# Patient Record
Sex: FEMALE | Race: WHITE | Employment: OTHER | URBAN - METROPOLITAN AREA
[De-identification: names, ages, dates, MRNs, and addresses within clinical notes are randomized per-mention and may not be internally consistent; named-entity substitution may affect disease eponyms.]

---

## 2017-02-10 ENCOUNTER — TELEPHONE (OUTPATIENT)
Dept: FAMILY MEDICINE CLINIC | Age: 77
End: 2017-02-10

## 2017-02-10 RX ORDER — ACETAMINOPHEN 500 MG
500 TABLET ORAL
Qty: 90 TAB | Refills: 0 | Status: SHIPPED | OUTPATIENT
Start: 2017-02-10

## 2017-02-10 NOTE — TELEPHONE ENCOUNTER
Writer attempted to reach patient's daughter Domingo Owens on HIPAA form in regard to correspondences received from patient's assisted living home [facility] [place]. Correspondences are in regard to diet requirements for patient and requesting medications for cold symptoms. Writer requested patient's daughter to call the office as Masoodsameer Flores is no longer with the office and was under the impression patient's care was now under the PCP with [place].

## 2017-02-10 NOTE — TELEPHONE ENCOUNTER
Kortney Hodgson returned writer's call, patient's daughter advised she will be continuing care with our office under  when she is due for a follow up in June(6 moths from last visit). Kortney Hodgson also advised patient has chronic allergies and cold symptoms, and does not require any medication as requested per fax received from [facility] today. Also advised she was in the facility yesterday and did not note her mother to be sick. In regard to patient's diet request for received Nimisha advised patient does not require pureed,mechanical soft or thickened liquids. Advised patient eats a normal diet and may require an aid to assist with feedings. Writer contacted patient assisted living facility to advise of above.

## 2017-02-10 NOTE — TELEPHONE ENCOUNTER
Patient must have orders for all medications including PRN's , patient assisted living facility is requesting an order for Tylenol 500 mg PRN. Patient plans to follow up with  is June when follow up is due.

## 2017-02-10 NOTE — TELEPHONE ENCOUNTER
Cristian Nevarez    -     [phone]    -  Did an order for Tylenol 500MG come over from [facility]-  Please advise

## 2017-04-01 ENCOUNTER — TELEPHONE (OUTPATIENT)
Dept: FAMILY MEDICINE CLINIC | Age: 77
End: 2017-04-01

## 2017-04-01 RX ORDER — NYSTATIN 100000 U/G
CREAM TOPICAL 2 TIMES DAILY
Qty: 30 G | Refills: 2 | Status: ON HOLD | OUTPATIENT
Start: 2017-04-01 | End: 2020-11-20

## 2017-04-01 NOTE — TELEPHONE ENCOUNTER
Former [facility] patient  Got a fax that she has intertrigo (redness groin and under belly).  Will send in cream and fax form back to home ([facility] assisted living [phone])

## 2017-06-05 ENCOUNTER — OFFICE VISIT (OUTPATIENT)
Dept: FAMILY MEDICINE CLINIC | Age: 77
End: 2017-06-05

## 2017-06-05 VITALS
HEART RATE: 55 BPM | BODY MASS INDEX: 36.64 KG/M2 | RESPIRATION RATE: 16 BRPM | SYSTOLIC BLOOD PRESSURE: 132 MMHG | HEIGHT: 66 IN | DIASTOLIC BLOOD PRESSURE: 62 MMHG | TEMPERATURE: 98.2 F | WEIGHT: 228 LBS | OXYGEN SATURATION: 95 %

## 2017-06-05 DIAGNOSIS — M35.3 POLYMYALGIA RHEUMATICA (HCC): Primary | ICD-10-CM

## 2017-06-05 DIAGNOSIS — Z79.899 ENCOUNTER FOR MEDICATION REVIEW: ICD-10-CM

## 2017-06-05 DIAGNOSIS — E11.8 TYPE 2 DIABETES MELLITUS WITH COMPLICATION, WITHOUT LONG-TERM CURRENT USE OF INSULIN (HCC): ICD-10-CM

## 2017-06-05 LAB — HBA1C MFR BLD HPLC: 7.6 %
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
## 2017-06-05 NOTE — PROGRESS NOTES
Patient presents in office today with daughter to establish care with provider  Would like order written for otc cough medicine to be given prn at [facility]    Chief Complaint   Patient presents with   [address] transfer     1. Have you been to the ER, urgent care clinic since your last visit? Hospitalized since your last visit? No    2. Have you seen or consulted any other health care providers outside of the [address] [place] [address] since your last visit? Include any pap smears or colon screening. No     PHQ over the last two weeks 6/5/2017   Little interest or pleasure in doing things Not at all   Feeling down, depressed or hopeless Not at all   Total Score PHQ 2 0     Fall Risk Assessment, last 12 mths 6/5/2017   Able to walk? Yes   Fall in past 12 months? Yes   Fall with injury?  No   Number of falls in past 12 months 3   Fall Risk Score 3     Vitals:    06/05/17 1533   BP: 132/62   BP 1 Location: Left arm   BP Patient Position: Sitting   Pulse: (!) 55   Resp: 16   Temp: 98.2 °F (36.8 °C)   TempSrc: Oral   SpO2: 95%   Weight: 228 lb (103.4 kg)   Height: 5' 6\" (1.676 m)

## 2017-06-05 NOTE — PATIENT INSTRUCTIONS

## 2017-06-08 ENCOUNTER — DOCUMENTATION ONLY (OUTPATIENT)
Dept: FAMILY MEDICINE CLINIC | Age: 77
End: 2017-06-08

## 2017-06-08 DIAGNOSIS — R52 GENERALIZED PAIN: ICD-10-CM

## 2017-06-08 DIAGNOSIS — Z88.9 H/O SEASONAL ALLERGIES: Primary | ICD-10-CM

## 2017-06-08 RX ORDER — FLUOXETINE 20 MG/1
TABLET ORAL
COMMUNITY
Start: 2017-05-24 | End: 2017-06-13

## 2017-06-08 RX ORDER — OSELTAMIVIR PHOSPHATE 75 MG/1
CAPSULE ORAL
COMMUNITY
Start: 2017-04-07 | End: 2017-06-13

## 2017-06-08 RX ORDER — LORATADINE 10 MG/1
10 TABLET ORAL
Qty: 30 TAB | Refills: 5
Start: 2017-06-08 | End: 2017-10-02 | Stop reason: SDUPTHER

## 2017-06-09 ENCOUNTER — TELEPHONE (OUTPATIENT)
Dept: FAMILY MEDICINE CLINIC | Age: 77
End: 2017-06-09

## 2017-06-09 NOTE — TELEPHONE ENCOUNTER
[id number] [place] [address] Dr -   [facility] [place] need an Order for Urine sample to be tested for UTI-

## 2017-06-09 NOTE — TELEPHONE ENCOUNTER
Called daughter, because of Dr. Catalino Lopez recommendations, she would like to have her evaluated instead of collected and sent out to lab [facility].     Adv daughter of this, she will speak with her sister who is near pts residential and discuss. Reviewed all emergency Signs/Sx with the patient, and when appropriate to arrange for urgent care. Advised patient of after hours/on-call office phone numbers and locations for after hours care. Patient/ Caller given an opportunity to ask questions, repeated information, and verbalized understanding.

## 2017-06-12 ENCOUNTER — TELEPHONE (OUTPATIENT)
Dept: FAMILY MEDICINE CLINIC | Age: 77
End: 2017-06-12

## 2017-06-12 NOTE — TELEPHONE ENCOUNTER
Okay to work her in tomorrow; may take evening slot if needed. If her clinical status worsens in the interim, she should go seek urgent care.

## 2017-06-12 NOTE — TELEPHONE ENCOUNTER
Contact # is [facility], patient's daughter, states Dr RAMOS was concerned about patient having a UTI on Friday & she is wondering if Dr RAMOS can put in the order for a urine sample so she can bring patient in today to do it.

## 2017-06-12 NOTE — TELEPHONE ENCOUNTER
Routing to Dr. Samara Hanna,     Called daughter on Friday and advised her that patient would need to be examined. There are no same day appointments left for today. Would you like for me to work her in tomorrow, or do you want to do a urine sample for the lab?

## 2017-06-13 ENCOUNTER — OFFICE VISIT (OUTPATIENT)
Dept: FAMILY MEDICINE CLINIC | Age: 77
End: 2017-06-13

## 2017-06-13 VITALS
BODY MASS INDEX: 36.82 KG/M2 | DIASTOLIC BLOOD PRESSURE: 64 MMHG | HEART RATE: 59 BPM | RESPIRATION RATE: 14 BRPM | SYSTOLIC BLOOD PRESSURE: 138 MMHG | HEIGHT: 66 IN | TEMPERATURE: 98.4 F | OXYGEN SATURATION: 96 % | WEIGHT: 229.13 LBS

## 2017-06-13 DIAGNOSIS — R46.89 EPISODE OF ABNORMAL BEHAVIOR: Primary | ICD-10-CM

## 2017-06-13 NOTE — PROGRESS NOTES
Unusual behavior started x 4 days from assisted living facility   Possible concerns with uti    Chief Complaint   Patient presents with    UTI     r/o uti x 4 days       1. Have you been to the ER, urgent care clinic since your last visit? Hospitalized since your last visit? No    2. Have you seen or consulted any other health care providers outside of the [address] [place] [address] since your last visit? Include any pap smears or colon screening. No    PHQ over the last two weeks 6/13/2017   Little interest or pleasure in doing things Not at all   Feeling down, depressed or hopeless Not at all   Total Score PHQ 2 0     Fall Risk Assessment, last 12 mths 6/13/2017   Able to walk? Yes   Fall in past 12 months?  No   Fall with injury? -   Number of falls in past 12 months -   Fall Risk Score -

## 2017-06-13 NOTE — PATIENT INSTRUCTIONS
Dementia: Care Instructions  Your Care Instructions  Dementia is a loss of mental skills that affects your daily life. It is different than the occasional trouble with memory that is part of aging. You may find it hard to remember things that you feel you should be able to remember. Or you may feel that your mind is just not working as well as usual.  Finding out that you have dementia is a shock. You may be afraid and worried about how the condition will change your life. Although there is no cure at this time, medicine may slow memory loss and improve thinking for a while. Other medicines may be able to help you sleep or cope with depression and behavior changes. Dementia often gets worse slowly. But it can get worse quickly. As dementia gets worse, it may become harder to do common things that take planning, like making a list and going shopping. Over time, the disease may make it hard for you to take care of yourself. Some people with dementia need others to help care for them. Dementia is different for everyone. You may be able to function well for a long time. In the early stage of the condition, you can do things at home to make life easier and safer. You also can keep doing your hobbies and other activities. Many people find comfort in planning now for their future needs. Follow-up care is a key part of your treatment and safety. Be sure to make and go to all appointments, and call your doctor if you are having problems. Its also a good idea to know your test results and keep a list of the medicines you take. How can you care for yourself at home? · Take your medicines exactly as prescribed. Call your doctor if you think you are having a problem with your medicine. · Eat healthy foods. Eat lots of whole grains, fruits, and vegetables every day.  If you are not hungry, try snacks or nutritional drinks such as Boost, Ensure, or Sustacal.  · If you have problems sleeping:  ¨ Try not to nap too close to your bedtime. ¨ Exercise regularly. Walking is a good choice. ¨ Try a glass of warm milk or caffeine-free herbal tea before bed. · Do tasks and activities during the time of day when you feel your best. It may help to develop a daily routine. · Post labels, lists, and sticky notes to help you remember things. Write your activities on a calendar you can easily find. Put your clock where you can easily see it. · Stay active. Take walks in familiar places, or with friends or loved ones. Try to stay active mentally too. Read and work crossword puzzles if you enjoy these activities. · Do not drive unless you can pass an on-road driving test. If you are not sure if you are safe to drive, your state s license bureau can test you. · Keep a cordless phone and a flashlight with new batteries by your bed. If possible, put a phone in each of the main rooms of your house, or carry a cell phone in case you fall and cannot reach a phone. Or, you can wear a device around your neck or wrist. You push a button that sends a signal for help. Acknowledge your emotions and plan for the future  · Talk openly and honestly with your doctor. · Let yourself grieve. It is common to feel angry, scared, frustrated, anxious, or depressed. · Get emotional support from family, friends, a support group, or a counselor experienced in working with people who have dementia. · Ask for help if you need it. · Plan for the future. ¨ Talk to your family and doctor about preparing a living will and other important papers while you can make decisions. These papers tell your doctors how to care for you at the end of your life. ¨ Consider naming a person to make decisions about your care if you are not able to. When should you call for help? Call 911 anytime you think you may need emergency care. For example, call if:  · You are lost and do not know whom to call. · You are injured and do not know whom to call.   Call your doctor now or seek immediate medical care if:  · You are more confused or upset than usual.  · You feel like you could hurt yourself because your mind is not working well. Watch closely for changes in your health, and be sure to contact your doctor if you have any problems. Where can you learn more? Go to http://jean claude-cris.info/. Enter E883 in the search box to learn more about \"Dementia: Care Instructions. \"  Current as of: July 26, 2016  Content Version: 11.2  © 8112-3072 [employer]. Care instructions adapted under license by [employer] (which disclaims liability or warranty for this information). If you have questions about a medical condition or this instruction, always ask your healthcare professional. [address] any warranty or liability for your use of this information.

## 2017-06-13 NOTE — MR AVS SNAPSHOT
Visit Information Date & Time Provider Department Dept. Phone Encounter #  
 6/13/2017  3:15 PM Tam[facility] Diana  [facility] [address] [phone] [id number] Follow-up Instructions Return if symptoms worsen or fail to improve. Your Appointments 6/13/2017  3:15 PM  
SAME DAY with Tamera Diana MD  
[facility]) Appt Note: UTI Symptoms [address] [facility] [address]  
[phone]  
  
   
 [address] [facility] [address]  
  
    
 12/5/2017  2:45 PM  
ROUTINE CARE with Tamera Diana MD  
[facility] Appt Note: 6mo follow up  
 [address] [address]  
[phone]  
  
   
 [address] [address] Upcoming Health Maintenance Date Due  
 EYE EXAM RETINAL OR DILATED Q1 8/20/2015 GLAUCOMA SCREENING Q2Y 8/20/2016 LIPID PANEL Q1 11/10/2016 FOOT EXAM Q1 5/12/2017 MICROALBUMIN Q1 5/12/2017 INFLUENZA AGE 9 TO ADULT 8/1/2017 Pneumococcal 65+ Low/Medium Risk (2 of 2 - PPSV23) 9/9/2017 HEMOGLOBIN A1C Q6M 12/5/2017 MEDICARE YEARLY EXAM 12/7/2017 DTaP/Tdap/Td series (2 - Td) 9/18/2026 Allergies as of 6/13/2017  Review Complete On: 6/13/2017 By: Michael Bowers LPN No Known Allergies Current Immunizations  Reviewed on 6/5/2017 Name Date Influenza High Dose Vaccine PF 9/21/2016 Influenza Vaccine 11/7/2013 Pneumococcal Vaccine (Unspecified Type) 9/9/2012 TB Skin Test (PPD) Intradermal 5/15/2015 Td 9/2/2012 Tdap 9/18/2016  5:39 PM  
  
 Not reviewed this visit Vitals BP Pulse Temp Resp Height(growth percentile) Weight(growth percentile)  
 138/64 (BP 1 Location: Right arm, BP Patient Position: Sitting) (!) 59 98.4 °F (36.9 °C) (Oral) 14 5' 6\" (1.676 m) 229 lb 2 oz (103.9 kg) SpO2 BMI OB Status Smoking Status 96% 36.98 kg/m2 Hysterectomy Former Smoker Vitals History BMI and BSA Data Body Mass Index Body Surface Area  
 36.98 kg/m 2 2.2 m 2 Preferred Pharmacy Pharmacy Name Phone [facility], [address] [phone] Your Updated Medication List  
  
   
This list is accurate as of: 6/13/17  1:58 PM.  Always use your most recent med list.  
  
  
  
  
 acetaminophen 500 mg tablet Commonly known as:  TYLENOL Take 1 Tab by mouth every six (6) hours as needed for Pain. aspirin delayed-release 81 mg tablet Take  by mouth daily. calcium-cholecalciferol (D3) tablet Commonly known as:  CALTRATE 600+D Take 1 Tab by mouth daily. cholecalciferol 400 unit Tab tablet Commonly known as:  VITAMIN D3 Take 400 Units by mouth two (2) times a day. donepezil 10 mg tablet Commonly known as:  ARICEPT Take 10 mg by mouth nightly. FLUoxetine 20 mg capsule Commonly known as:  PROzac TAKE 3 CAPSULES BY MOUTH DAILY. hydroxychloroquine 200 mg tablet Commonly known as:  PLAQUENIL Take 1 Tab by mouth two (2) times a day. loratadine 10 mg tablet Commonly known as:  Rory Otter Take 1 Tab by mouth daily as needed for Allergies. * nystatin powder Commonly known as:  MYCOSTATIN Apply  to affected area four (4) times daily as needed. * nystatin topical cream  
Commonly known as:  MYCOSTATIN Apply  to affected area two (2) times a day. For intertrigo  
  
 predniSONE 1 mg tablet Commonly known as:  DELTASONE  
TAKE 3 TABLETS BY MOUTH EVERY DAY  
  
 VITAMIN B-12 PO Take 500 mcg by mouth daily. * Notice: This list has 2 medication(s) that are the same as other medications prescribed for you. Read the directions carefully, and ask your doctor or other care provider to review them with you. Follow-up Instructions Return if symptoms worsen or fail to improve. Patient Instructions Dementia: Care Instructions Your Care Instructions Dementia is a loss of mental skills that affects your daily life. It is different than the occasional trouble with memory that is part of aging. You may find it hard to remember things that you feel you should be able to remember. Or you may feel that your mind is just not working as well as usual. 
Finding out that you have dementia is a shock. You may be afraid and worried about how the condition will change your life. Although there is no cure at this time, medicine may slow memory loss and improve thinking for a while. Other medicines may be able to help you sleep or cope with depression and behavior changes. Dementia often gets worse slowly. But it can get worse quickly. As dementia gets worse, it may become harder to do common things that take planning, like making a list and going shopping. Over time, the disease may make it hard for you to take care of yourself. Some people with dementia need others to help care for them. Dementia is different for everyone. You may be able to function well for a long time. In the early stage of the condition, you can do things at home to make life easier and safer. You also can keep doing your hobbies and other activities. Many people find comfort in planning now for their future needs. Follow-up care is a key part of your treatment and safety. Be sure to make and go to all appointments, and call your doctor if you are having problems. Its also a good idea to know your test results and keep a list of the medicines you take. How can you care for yourself at home? · Take your medicines exactly as prescribed. Call your doctor if you think you are having a problem with your medicine. · Eat healthy foods. Eat lots of whole grains, fruits, and vegetables every day. If you are not hungry, try snacks or nutritional drinks such as Boost, Ensure, or Sustacal. 
· If you have problems sleeping: ¨ Try not to nap too close to your bedtime. ¨ Exercise regularly. Walking is a good choice. ¨ Try a glass of warm milk or caffeine-free herbal tea before bed. · Do tasks and activities during the time of day when you feel your best. It may help to develop a daily routine. · Post labels, lists, and sticky notes to help you remember things. Write your activities on a calendar you can easily find. Put your clock where you can easily see it. · Stay active. Take walks in familiar places, or with friends or loved ones. Try to stay active mentally too. Read and work crossword puzzles if you enjoy these activities. · Do not drive unless you can pass an on-road driving test. If you are not sure if you are safe to drive, your state s license bureau can test you. · Keep a cordless phone and a flashlight with new batteries by your bed. If possible, put a phone in each of the main rooms of your house, or carry a cell phone in case you fall and cannot reach a phone. Or, you can wear a device around your neck or wrist. You push a button that sends a signal for help. Acknowledge your emotions and plan for the future · Talk openly and honestly with your doctor. · Let yourself grieve. It is common to feel angry, scared, frustrated, anxious, or depressed. · Get emotional support from family, friends, a support group, or a counselor experienced in working with people who have dementia. · Ask for help if you need it. · Plan for the future. ¨ Talk to your family and doctor about preparing a living will and other important papers while you can make decisions. These papers tell your doctors how to care for you at the end of your life. ¨ Consider naming a person to make decisions about your care if you are not able to. When should you call for help? Call 911 anytime you think you may need emergency care. For example, call if: 
· You are lost and do not know whom to call. · You are injured and do not know whom to call. Call your doctor now or seek immediate medical care if: 
· You are more confused or upset than usual. 
· You feel like you could hurt yourself because your mind is not working well. Watch closely for changes in your health, and be sure to contact your doctor if you have any problems. Where can you learn more? Go to http://jean claude-cris.info/. Enter W076 in the search box to learn more about \"Dementia: Care Instructions. \" Current as of: July 26, 2016 Content Version: 11.2 © 7912-7641 [employer]. Care instructions adapted under license by [employer] (which disclaims liability or warranty for this information). If you have questions about a medical condition or this instruction, always ask your healthcare professional. [address] any warranty or liability for your use of this information. Introducing [facility]! Dear Vitaly Adames: Thank you for requesting a [employer] account. Our records indicate that you already have an active Webee account. You can access your account anytime at [email or web address] [employer] Did you know that you can access your hospital and ER discharge instructions at any time in [employer]? You can also review all of your test results from your hospital stay or ER visit. Additional Information If you have questions, please visit the Frequently Asked Questions section of the [employer] website at [email or web address] Remember, Webee is NOT to be used for urgent needs. For medical emergencies, dial 911. Now available from your iPhone and Android! Please provide this summary of care documentation to your next provider. Your primary care clinician is listed as Ramin Casas. If you have any questions after today's visit, please call [phone].

## 2017-06-13 NOTE — PROGRESS NOTES
Patient Name: Steve Gill   MRN: [id number]    Ab Melgoza is a 76 y.o. female who presents with the following: here with daughter    Daughter reports that a new nurse at her group home stated that pt was acting funny last week earlier in the day. Does have a hx of sundowning but was wandering around the facility, trying to get into rooms. Daughter reported that the nurse wanted pt to be evaluated for a UTI. Daughter reports the nurse may have been new and not familiar with patient; another nurse who knows the patient better denied any behavioral change from baseline. Pt does have hx of UTIs, likely due to urinary incontinence. No hx of fever, abdominal pain, dysuria, diarrhea, poor appetite. No recent med changes. Review of Systems   Unable to perform ROS: Dementia       The patient's medications, allergies, past medical history, surgical history, family history and social history were reviewed and updated where appropriate. Prior to Admission medications    Medication Sig Start Date End Date Taking? Authorizing Provider   FLUoxetine (PROZAC) 20 mg tablet  5/24/17  Yes Historical Provider   oseltamivir (TAMIFLU) 75 mg capsule  4/7/17  Yes Historical Provider   loratadine (CLARITIN) 10 mg tablet Take 1 Tab by mouth daily as needed for Allergies. 6/8/17  Yes Sai Harper MD   nystatin (MYCOSTATIN) topical cream Apply  to affected area two (2) times a day. For intertrigo 4/1/17  Yes Stacy Shaver MD   acetaminophen (TYLENOL) 500 mg tablet Take 1 Tab by mouth every six (6) hours as needed for Pain. 2/10/17  Yes Sai Harper MD   aspirin delayed-release 81 mg tablet Take  by mouth daily. Yes Historical Provider   nystatin (MYCOSTATIN) powder Apply  to affected area four (4) times daily as needed. 7/6/16  Yes Georgia Enamorado DO   donepezil (ARICEPT) 10 mg tablet Take 10 mg by mouth nightly.  12/2/15  Yes Historical Provider   FLUoxetine (PROZAC) 20 mg capsule TAKE 3 CAPSULES BY MOUTH DAILY. 6/2/15  Yes Taylor Bosworth, DO   predniSONE (DELTASONE) 1 mg tablet TAKE 3 TABLETS BY MOUTH EVERY DAY 1/24/15  Yes Paola Crain MD   hydroxychloroquine (PLAQUENIL) 200 mg tablet Take 1 Tab by mouth two (2) times a day. 7/8/14  Yes Caroline Leblanc PA-C   CYANOCOBALAMIN, VITAMIN B-12, (VITAMIN B-12 PO) Take 500 mcg by mouth daily. Yes Historical Provider   calcium-cholecalciferol, D3, (CALTRATE 600+D) tablet Take 1 Tab by mouth daily. Yes Historical Provider   cholecalciferol (VITAMIN D3) 400 unit Tab tablet Take 400 Units by mouth two (2) times a day. Yes Historical Provider       No Known Allergies        OBJECTIVE    Visit Vitals    /64 (BP 1 Location: Right arm, BP Patient Position: Sitting)    Pulse (!) 59    Temp 98.4 °F (36.9 °C) (Oral)    Resp 14    Ht 5' 6\" (1.676 m)    Wt 229 lb 2 oz (103.9 kg)    SpO2 96%    BMI 36.98 kg/m2       Physical Exam   Constitutional: She is well-developed, well-nourished, and in no distress. No distress. HENT:   Head: Normocephalic and atraumatic. Right Ear: External ear normal.   Left Ear: External ear normal.   Eyes: Conjunctivae and EOM are normal. Pupils are equal, round, and reactive to light. Cardiovascular: Normal rate, regular rhythm and normal heart sounds. Exam reveals no gallop and no friction rub. No murmur heard. Pulmonary/Chest: Effort normal and breath sounds normal. No respiratory distress. She has no wheezes. Abdominal: Soft. Bowel sounds are normal. She exhibits no distension and no mass. There is no tenderness. There is no rebound and no guarding. Skin: She is not diaphoretic. Psychiatric: Mood, memory, affect and judgment normal.   Nursing note and vitals reviewed. 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 Episode of abnormal behavior  Unclear if patient is acting different from baseline.  No recent med changes and currently asymptomatic. Will rule out UTI with order sent to [facility]; will await results. Medications Discontinued During This Encounter   Medication Reason    oseltamivir (TAMIFLU) 75 mg capsule Not A Current Medication    FLUoxetine (PROZAC) 20 mg tablet Not A Current Medication       Follow-up Disposition:  Return if symptoms worsen or fail to improve. Medication risks/benefits/costs/interactions/alternatives discussed with patient. Advised patient to call back or return to office if symptoms worsen/change/persist. If patient cannot reach us or should anything more severe/urgent arise he/she should proceed directly to the nearest emergency department. Discussed expected course/resolution/complications of diagnosis in detail with patient. Patient given a written after visit summary which includes his/her diagnoses, current medications and vitals. Patient expressed understanding with the diagnosis and plan.      Luis Hernández M.D.

## 2017-06-22 LAB
BILIRUB UR QL: NEGATIVE
BLOOD CULTURE: NORMAL
CLARITY: NORMAL
COLOR, 120428: NORMAL
EPITHELIAL CELLS: NORMAL
Lab: 5.5
Lab: NEGATIVE
Lab: NEGATIVE
Lab: NORMAL
NITRITE UR QL STRIP: NEGATIVE
PROT UR-MCNC: NORMAL G/DL
SP GR UR: 1.02
UROBILINOGEN UR STRIP-MCNC: <2 MG/DL
WBC URNS QL MICRO: >50

## 2017-06-23 ENCOUNTER — TELEPHONE (OUTPATIENT)
Dept: FAMILY MEDICINE CLINIC | Age: 77
End: 2017-06-23

## 2017-06-23 DIAGNOSIS — N39.0 URINARY TRACT INFECTION WITHOUT HEMATURIA, SITE UNSPECIFIED: Primary | ICD-10-CM

## 2017-06-23 RX ORDER — SULFAMETHOXAZOLE AND TRIMETHOPRIM 800; 160 MG/1; MG/1
1 TABLET ORAL 2 TIMES DAILY
Qty: 6 TAB | Refills: 0 | Status: SHIPPED | OUTPATIENT
Start: 2017-06-23 | End: 2017-06-26

## 2017-06-23 NOTE — TELEPHONE ENCOUNTER
Called and spoke with [facility] regarding order for culture when bactrim is finished.

## 2017-06-23 NOTE — TELEPHONE ENCOUNTER
Received urine culture finalized to greater than [id number] CFU's per mL of gram-negative rods. Susceptibilities are not available at this time. Recommend Bactrim DS 1 tab twice daily for 3 days. Recommend follow-up urine culture with speciation and susceptibilities once patient completes treatment. Facility to notify us if patient has new symptoms or changes in her mental status.

## 2017-06-23 NOTE — TELEPHONE ENCOUNTER
Sanjay Lal-  [facility] [place]  End     -    [phone]    -  Requesting to speak w/ nurse regarding Urine Culture

## 2017-08-23 ENCOUNTER — TELEPHONE (OUTPATIENT)
Dept: FAMILY MEDICINE CLINIC | Age: 77
End: 2017-08-23

## 2017-08-23 NOTE — TELEPHONE ENCOUNTER
Evita Dear     - [phone]     -  Daughter requesting nurse to call [facility] to have an UTI done on patient-  States it has been done before-

## 2017-08-24 NOTE — TELEPHONE ENCOUNTER
Call to patient's daughter  verified. Informed daughter we have faxed over order . If patient symptoms worsen seek urgent care whether that be our office, Saturday clinic hours, or urgent care facility.  She understands

## 2017-08-24 NOTE — TELEPHONE ENCOUNTER
Call to daughter on hippa.  verified. She states mother is acting a little bit strange and is more confused than normal. She lost her balance getting out of the chair and fell on her knees. She states it hasn't been long she had one and since she is incontinent she is more prone. She would like us to fax order over to [facility] so that they can test her urine for infection    Call to [facility].  Spoke to nurse-she states to fax over prescription stating  [address] [facility] to [phone]

## 2017-08-31 ENCOUNTER — OFFICE VISIT (OUTPATIENT)
Dept: FAMILY MEDICINE CLINIC | Age: 77
End: 2017-08-31

## 2017-08-31 VITALS
BODY MASS INDEX: 36.64 KG/M2 | DIASTOLIC BLOOD PRESSURE: 78 MMHG | RESPIRATION RATE: 18 BRPM | WEIGHT: 228 LBS | HEART RATE: 61 BPM | SYSTOLIC BLOOD PRESSURE: 132 MMHG | HEIGHT: 66 IN | TEMPERATURE: 98.2 F | OXYGEN SATURATION: 95 %

## 2017-08-31 DIAGNOSIS — R05.9 COUGH: Primary | ICD-10-CM

## 2017-08-31 RX ORDER — FLUTICASONE PROPIONATE 50 MCG
2 SPRAY, SUSPENSION (ML) NASAL DAILY
Qty: 1 BOTTLE | Refills: 0 | Status: SHIPPED | OUTPATIENT
Start: 2017-08-31 | End: 2017-10-02 | Stop reason: SDUPTHER

## 2017-08-31 RX ORDER — DEXTROMETHORPHAN POLISTIREX 30 MG/5ML
60 SUSPENSION ORAL
Qty: 89 ML | Refills: 0 | Status: SHIPPED | OUTPATIENT
Start: 2017-08-31 | End: 2017-10-02 | Stop reason: SDUPTHER

## 2017-08-31 NOTE — PROGRESS NOTES
Patient Name: Shalini Samano   MRN: [id number]    Marcos Nails is a 76 y.o. female who presents with the following: Here today with her daughter. Patient's daughter reports that patient has had a cough for 3 days. Describes the cough as productive and she is clearing her throat often. Feels that she is moving slower today and easily winded. They did rearrange her furniture at her assisted living facility which may have kicked up a lot of dust.  Denies chest pain, shortness of breath, fevers, history of smoking, history of asthma, history of heartburn. Has been receiving Delsym every 12 hours which is not helping symptoms. Review of Systems   Constitutional: Positive for malaise/fatigue. Negative for fever and weight loss. Respiratory: Positive for cough. Negative for hemoptysis, shortness of breath and wheezing. Cardiovascular: Negative for chest pain, palpitations, leg swelling and PND. Gastrointestinal: Negative for abdominal pain, constipation, diarrhea, nausea and vomiting. The patient's medications, allergies, past medical history, surgical history, family history and social history were reviewed and updated where appropriate. Prior to Admission medications    Medication Sig Start Date End Date Taking? Authorizing Provider   loratadine (CLARITIN) 10 mg tablet Take 1 Tab by mouth daily as needed for Allergies. 6/8/17  Yes Meche Martinez MD   nystatin (MYCOSTATIN) topical cream Apply  to affected area two (2) times a day. For intertrigo  Patient taking differently: Apply  to affected area as needed. For intertrigo 4/1/17  Yes Anahi Snow MD   acetaminophen (TYLENOL) 500 mg tablet Take 1 Tab by mouth every six (6) hours as needed for Pain. 2/10/17  Yes Meche Martinez MD   aspirin delayed-release 81 mg tablet Take  by mouth daily. Yes Historical Provider   nystatin (MYCOSTATIN) powder Apply  to affected area four (4) times daily as needed. 7/6/16  Yes Navdeep Church DO   donepezil (ARICEPT) 10 mg tablet Take 10 mg by mouth nightly. 12/2/15  Yes Historical Provider   FLUoxetine (PROZAC) 20 mg capsule TAKE 3 CAPSULES BY MOUTH DAILY. 6/2/15  Yes Navdeep Church DO   predniSONE (DELTASONE) 1 mg tablet TAKE 3 TABLETS BY MOUTH EVERY DAY 1/24/15  Yes Jermain Mims MD   hydroxychloroquine (PLAQUENIL) 200 mg tablet Take 1 Tab by mouth two (2) times a day. 7/8/14  Yes Caroline Leblanc PA-C   CYANOCOBALAMIN, VITAMIN B-12, (VITAMIN B-12 PO) Take 500 mcg by mouth daily. Yes Historical Provider   calcium-cholecalciferol, D3, (CALTRATE 600+D) tablet Take 1 Tab by mouth daily. Yes Historical Provider   cholecalciferol (VITAMIN D3) 400 unit Tab tablet Take 400 Units by mouth two (2) times a day. Yes Historical Provider       No Known Allergies        OBJECTIVE    Visit Vitals    /78 (BP 1 Location: Right arm, BP Patient Position: Sitting)    Pulse 61    Temp 98.2 °F (36.8 °C) (Oral)    Resp 18    Ht 5' 6\" (1.676 m)    Wt 228 lb (103.4 kg)    SpO2 95%    BMI 36.8 kg/m2       Physical Exam   Constitutional: She is oriented to person, place, and time and well-developed, well-nourished, and in no distress. No distress. HENT:   Head: Normocephalic and atraumatic. Right Ear: Tympanic membrane is not perforated and not erythematous. No middle ear effusion. No decreased hearing is noted. Left Ear: Tympanic membrane is not perforated and not erythematous. No middle ear effusion. No decreased hearing is noted. Nose: Nose normal. Right sinus exhibits no maxillary sinus tenderness and no frontal sinus tenderness. Left sinus exhibits no maxillary sinus tenderness and no frontal sinus tenderness. Mouth/Throat: Uvula is midline, oropharynx is clear and moist and mucous membranes are normal.   Neck: Normal range of motion. Neck supple. Cardiovascular: Normal rate, regular rhythm and normal heart sounds. Exam reveals no gallop and no friction rub. No murmur heard. Pulmonary/Chest: Effort normal. No respiratory distress. She has no wheezes. She has rales (rales heard along anterior RUL). She exhibits no tenderness. Lymphadenopathy:     She has no cervical adenopathy. Neurological: She is alert and oriented to person, place, and time. Skin: She is not diaphoretic. Psychiatric: Mood, memory, affect and judgment normal.   Nursing note and vitals reviewed. ASSESSMENT AND PLAN  Royer Miller is a 76 y.o. female who presents today for:    1. Cough  Possible postnasal drip secondary to seasonal allergies versus viral URI. However, given patient's age, and immunosuppression, and unreliable history secondary to dementia, will obtain chest x-ray to rule out any pneumonia. Recommend daily Flonase and as needed Delsym for cough. Reviewed signs and symptoms that would indicate a worsening medical condition which would require immediate evaluation and treatment; patient expressed understanding of plan. - XR CHEST PA LAT; Future  - fluticasone (FLONASE) 50 mcg/actuation nasal spray; 2 Sprays by Both Nostrils route daily. Dispense: 1 Bottle; Refill: 0  - dextromethorphan (DELSYM) 30 mg/5 mL liquid; Take 10 mL by mouth two (2) times daily as needed for Cough. Dispense: 89 mL; Refill: 0       There are no discontinued medications. Follow-up Disposition:  Return if symptoms worsen or fail to improve. Medication risks/benefits/costs/interactions/alternatives discussed with patient. Advised patient to call back or return to office if symptoms worsen/change/persist. If patient cannot reach us or should anything more severe/urgent arise he/she should proceed directly to the nearest emergency department. Discussed expected course/resolution/complications of diagnosis in detail with patient. Patient given a written after visit summary which includes his/her diagnoses, current medications and vitals.   Patient expressed understanding with the diagnosis and plan.      Meche Martinez M.D.

## 2017-08-31 NOTE — PATIENT INSTRUCTIONS
Cough: Care Instructions  Your Care Instructions  A cough is your body's response to something that bothers your throat or airways. Many things can cause a cough. You might cough because of a cold or the flu, bronchitis, or asthma. Smoking, postnasal drip, allergies, and stomach acid that backs up into your throat also can cause coughs. A cough is a symptom, not a disease. Most coughs stop when the cause, such as a cold, goes away. You can take a few steps at home to cough less and feel better. Follow-up care is a key part of your treatment and safety. Be sure to make and go to all appointments, and call your doctor if you are having problems. It's also a good idea to know your test results and keep a list of the medicines you take. How can you care for yourself at home? · Drink lots of water and other fluids. This helps thin the mucus and soothes a dry or sore throat. Honey or lemon juice in hot water or tea may ease a dry cough. · Take cough medicine as directed by your doctor. · Prop up your head on pillows to help you breathe and ease a dry cough. · Try cough drops to soothe a dry or sore throat. Cough drops don't stop a cough. Medicine-flavored cough drops are no better than candy-flavored drops or hard candy. · Do not smoke. Avoid secondhand smoke. If you need help quitting, talk to your doctor about stop-smoking programs and medicines. These can increase your chances of quitting for good. When should you call for help? Call 911 anytime you think you may need emergency care. For example, call if:  · You have severe trouble breathing. Call your doctor now or seek immediate medical care if:  · You cough up blood. · You have new or worse trouble breathing. · You have a new or higher fever. · You have a new rash.   Watch closely for changes in your health, and be sure to contact your doctor if:  · You cough more deeply or more often, especially if you notice more mucus or a change in the color of your mucus. · You have new symptoms, such as a sore throat, an earache, or sinus pain. · You do not get better as expected. Where can you learn more? Go to http://jean claude-cris.info/. Enter D279 in the search box to learn more about \"Cough: Care Instructions. \"  Current as of: March 25, 2017  Content Version: 11.3  © 3965-6576 [employer]. Care instructions adapted under license by [employer] (which disclaims liability or warranty for this information). If you have questions about a medical condition or this instruction, always ask your healthcare professional. [address] any warranty or liability for your use of this information.

## 2017-08-31 NOTE — PROGRESS NOTES
Chief Complaint   Patient presents with    Cough     x 3 days - would like an order for cough medication  to be given at nursing home productive cough    Fatigue       1. Have you been to the ER, urgent care clinic since your last visit? Hospitalized since your last visit? No    2. Have you seen or consulted any other health care providers outside of the [address] [place] [address] since your last visit? Include any pap smears or colon screening.  No

## 2017-08-31 NOTE — MR AVS SNAPSHOT
Visit Information Date & Time Provider Department Dept. Phone Encounter #  
 8/31/2017  2:00 PM Rakel Mathias  [address] [phone] [id number] Follow-up Instructions Return if symptoms worsen or fail to improve. Your Appointments 10/9/2017 10:30 AM  
ESTABLISHED PATIENT with Jena Hand MD  
[address] ([address]) Appt Note: last seen 2015/dx RA  
 [address] [place] [id number] [phone]  
  
   
 [address] [place] [id number]  
  
    
 12/5/2017  2:45 PM  
ROUTINE CARE with Rakel Mathias MD  
[address] ([address]) Appt Note: 6mo follow up  
 [address] [address]  
[phone]  
  
   
 [address] [address] Upcoming Health Maintenance Date Due  
 EYE EXAM RETINAL OR DILATED Q1 8/20/2015 GLAUCOMA SCREENING Q2Y 8/20/2016 LIPID PANEL Q1 11/10/2016 FOOT EXAM Q1 5/12/2017 MICROALBUMIN Q1 5/12/2017 INFLUENZA AGE 9 TO ADULT 8/1/2017 Pneumococcal 65+ Low/Medium Risk (2 of 2 - PPSV23) 9/9/2017 HEMOGLOBIN A1C Q6M 12/5/2017 MEDICARE YEARLY EXAM 12/7/2017 DTaP/Tdap/Td series (2 - Td) 9/18/2026 Allergies as of 8/31/2017  Review Complete On: 8/31/2017 By: Rut Mathur No Known Allergies Current Immunizations  Reviewed on 6/5/2017 Name Date Influenza High Dose Vaccine PF 9/21/2016 Influenza Vaccine 11/7/2013 Pneumococcal Vaccine (Unspecified Type) 9/9/2012 TB Skin Test (PPD) Intradermal 5/15/2015 Td 9/2/2012 Tdap 9/18/2016  5:39 PM  
  
 Not reviewed this visit You Were Diagnosed With   
  
 Codes Comments Cough    -  Primary ICD-10-CM: Q19 ICD-9-CM: 726. 2 Vitals BP Pulse Temp Resp Height(growth percentile) Weight(growth percentile)  132/78 (BP 1 Location: Right arm, BP Patient Position: Sitting) 61 98.2 °F (36.8 °C) (Oral) 18 5' 6\" (1.676 m) 228 lb (103.4 kg) SpO2 BMI OB Status Smoking Status 95% 36.8 kg/m2 Hysterectomy Former Smoker Vitals History BMI and BSA Data Body Mass Index Body Surface Area  
 36.8 kg/m 2 2.19 m 2 Preferred Pharmacy Pharmacy Name Phone [facility], [address] [phone] Your Updated Medication List  
  
   
This list is accurate as of: 8/31/17  3:01 PM.  Always use your most recent med list.  
  
  
  
  
 acetaminophen 500 mg tablet Commonly known as:  TYLENOL Take 1 Tab by mouth every six (6) hours as needed for Pain. aspirin delayed-release 81 mg tablet Take  by mouth daily. calcium-cholecalciferol (D3) tablet Commonly known as:  CALTRATE 600+D Take 1 Tab by mouth daily. cholecalciferol 400 unit Tab tablet Commonly known as:  VITAMIN D3 Take 400 Units by mouth two (2) times a day. donepezil 10 mg tablet Commonly known as:  ARICEPT Take 10 mg by mouth nightly. FLUoxetine 20 mg capsule Commonly known as:  PROzac TAKE 3 CAPSULES BY MOUTH DAILY. hydroxychloroquine 200 mg tablet Commonly known as:  PLAQUENIL Take 1 Tab by mouth two (2) times a day. loratadine 10 mg tablet Commonly known as:  Ada Elliott Take 1 Tab by mouth daily as needed for Allergies. * nystatin powder Commonly known as:  MYCOSTATIN Apply  to affected area four (4) times daily as needed. * nystatin topical cream  
Commonly known as:  MYCOSTATIN Apply  to affected area two (2) times a day. For intertrigo  
  
 predniSONE 1 mg tablet Commonly known as:  DELTASONE  
TAKE 3 TABLETS BY MOUTH EVERY DAY  
  
 VITAMIN B-12 PO Take 500 mcg by mouth daily. * Notice: This list has 2 medication(s) that are the same as other medications prescribed for you.  Read the directions carefully, and ask your doctor or other care provider to review them with you. Follow-up Instructions Return if symptoms worsen or fail to improve. To-Do List   
 08/31/2017 Imaging:  XR CHEST PA LAT Patient Instructions Cough: Care Instructions Your Care Instructions A cough is your body's response to something that bothers your throat or airways. Many things can cause a cough. You might cough because of a cold or the flu, bronchitis, or asthma. Smoking, postnasal drip, allergies, and stomach acid that backs up into your throat also can cause coughs. A cough is a symptom, not a disease. Most coughs stop when the cause, such as a cold, goes away. You can take a few steps at home to cough less and feel better. Follow-up care is a key part of your treatment and safety. Be sure to make and go to all appointments, and call your doctor if you are having problems. It's also a good idea to know your test results and keep a list of the medicines you take. How can you care for yourself at home? · Drink lots of water and other fluids. This helps thin the mucus and soothes a dry or sore throat. Honey or lemon juice in hot water or tea may ease a dry cough. · Take cough medicine as directed by your doctor. · Prop up your head on pillows to help you breathe and ease a dry cough. · Try cough drops to soothe a dry or sore throat. Cough drops don't stop a cough. Medicine-flavored cough drops are no better than candy-flavored drops or hard candy. · Do not smoke. Avoid secondhand smoke. If you need help quitting, talk to your doctor about stop-smoking programs and medicines. These can increase your chances of quitting for good. When should you call for help? Call 911 anytime you think you may need emergency care. For example, call if: 
· You have severe trouble breathing. Call your doctor now or seek immediate medical care if: 
· You cough up blood. · You have new or worse trouble breathing. · You have a new or higher fever. · You have a new rash. Watch closely for changes in your health, and be sure to contact your doctor if: 
· You cough more deeply or more often, especially if you notice more mucus or a change in the color of your mucus. · You have new symptoms, such as a sore throat, an earache, or sinus pain. · You do not get better as expected. Where can you learn more? Go to http://jean [email or web address]-cris.info/. Enter D279 in the search box to learn more about \"Cough: Care Instructions. \" Current as of: March 25, 2017 Content Version: 11.3 © 4277-2082 [email or web address]. Care instructions adapted under license by [employer] (which disclaims liability or warranty for this information). If you have questions about a medical condition or this instruction, always ask your healthcare professional. [address] any warranty or liability for your use of this information. Introducing [facility] & HEALTH SERVICES! Dear Magdalena File: Thank you for requesting a Next Glass account. Our records indicate that you already have an active Next Glass account. You can access your account anytime at [email or web address] Did you know that you can access your hospital and ER discharge instructions at any time in Next Glass? You can also review all of your test results from your hospital stay or ER visit. Additional Information If you have questions, please visit the Frequently Asked Questions section of the Next Glass website at [email or web address] Remember, Next Glass is NOT to be used for urgent needs. For medical emergencies, dial 911. Now available from your iPhone and Android! Please provide this summary of care documentation to your next provider. Your primary care clinician is listed as Ramin Casas. If you have any questions after today's visit, please call [phone].

## 2017-09-12 ENCOUNTER — TELEPHONE (OUTPATIENT)
Dept: FAMILY MEDICINE CLINIC | Age: 77
End: 2017-09-12

## 2017-09-12 NOTE — TELEPHONE ENCOUNTER
Agree that xrays should not have been completed without a verbal physician's order. I was only aware of results and the falls until after all xrays were completed through a fax; no documentation that Quanah attempted to contact our office via telephone regarding the falls. There were 6 different xrays obtained, which was likely more than medically necessary and exposed patient to radiation without approval of a provider. Please relay our office policy to the nursing supervisor at [facility] as well as to the patient's daughter for future reference. If patient were to have another episode, the first step is to reach out to our office or our after-hours provider phone line for proper medical advice, not through a fax message. Per my review of xray readings, no acute fracture was identified; if pt is still having pain or new symptoms, she should be seen for OV.

## 2017-09-12 NOTE — TELEPHONE ENCOUNTER
Received fax from [facility] on 9/5/17 stating \"patient sustained bruises at left shoulder, knees, and hips. Xray request, please review and advised\" attached were xray results stating ordered by Dr. Vivek Guerra. Call to facility and inquired why orders were written under Dr. Valerio Press name when she has no record of patient fall and did not order xrays. KEVIN Carrington stated patient fell in middle of week and staff did not report fall. When daughter visited patient she saw bruising and wanted xrays done. JUAN MANUEL Johnson put in verbal order to have xrays completed without contacting Dr. Vivek Guerra. Advised this is not the proper way to order.  We have on call hours after 5pm and all day on weekends

## 2017-09-14 ENCOUNTER — TELEPHONE (OUTPATIENT)
Dept: FAMILY MEDICINE CLINIC | Age: 77
End: 2017-09-14

## 2017-09-14 NOTE — TELEPHONE ENCOUNTER
Call to Gilford Brought, executive directive at facility . Left message to call back office in regards to patient.

## 2017-09-14 NOTE — TELEPHONE ENCOUNTER
[facility] [place]    -    [phone]     -  Patient has a constant cough - Cough Syrup did no good-  Need an order for Chest Xray -  Fax  [phone]

## 2017-09-14 NOTE — TELEPHONE ENCOUNTER
Call to facility regarding patient. Gio Haynes states patient still has productive cough, has hoarseness in voice, no one has listened to patient's lungs. Patient's appetite is the same-she is eating and walking around. Patient is out of Delsym and has been using flonase rx as prescribed    Facility is requesting chest xray    Spoke with daughter, Jitendra Sepulveda, on hippa she states if xray is approved she prefers to have this done at [facility].

## 2017-09-14 NOTE — TELEPHONE ENCOUNTER
Okay to have CXR done as she was evaluated for this during OV last month and sounds like she has persistent symptoms; please put in verbal order for PA/lateral chest x ray.

## 2017-09-14 NOTE — TELEPHONE ENCOUNTER
Call to Abelino Modi, service directive at facility . Left message to call back office in regards to patient.

## 2017-09-14 NOTE — TELEPHONE ENCOUNTER
Spoke with [address] [place] [address] on memory care unit. Yony Marley stated they cannot take verbal order need order faxed.     Order signed and faxed to facility

## 2017-09-15 NOTE — TELEPHONE ENCOUNTER
Spoke with hannah newell, . Informed her of all info below. Informed her of office policy that all employees can reach us as we have on call providers 7 days a week (after hours). She states that she will handle all participating employees involved accordingly in adherence to the board of nursing policy. She states this is unacceptable and was handled incorrectly and she will make sure this gets corrected for future actions.

## 2017-10-02 ENCOUNTER — TELEPHONE (OUTPATIENT)
Dept: FAMILY MEDICINE CLINIC | Age: 77
End: 2017-10-02

## 2017-10-02 DIAGNOSIS — R05.9 COUGH: Primary | ICD-10-CM

## 2017-10-02 DIAGNOSIS — M85.80 OSTEOPENIA, UNSPECIFIED LOCATION: ICD-10-CM

## 2017-10-02 DIAGNOSIS — J30.1 SEASONAL ALLERGIC RHINITIS DUE TO POLLEN, UNSPECIFIED CHRONICITY: ICD-10-CM

## 2017-10-02 RX ORDER — LORATADINE 10 MG/1
10 TABLET ORAL
Qty: 90 TAB | Refills: 1 | Status: SHIPPED | OUTPATIENT
Start: 2017-10-02

## 2017-10-02 RX ORDER — MULTIVITAMIN
1 TABLET ORAL DAILY
Qty: 90 TAB | Refills: 1 | Status: SHIPPED | OUTPATIENT
Start: 2017-10-02 | End: 2018-01-29

## 2017-10-02 RX ORDER — CYANOCOBALAMIN (VITAMIN B-12) 500 MCG
400 TABLET ORAL 2 TIMES DAILY
Status: CANCELLED | OUTPATIENT
Start: 2017-10-02

## 2017-10-02 RX ORDER — FLUTICASONE PROPIONATE 50 MCG
2 SPRAY, SUSPENSION (ML) NASAL DAILY
Qty: 1 BOTTLE | Refills: 3 | Status: ON HOLD | OUTPATIENT
Start: 2017-10-02 | End: 2020-11-20

## 2017-10-02 RX ORDER — LANOLIN ALCOHOL/MO/W.PET/CERES
500 CREAM (GRAM) TOPICAL DAILY
Qty: 90 TAB | Refills: 1 | Status: CANCELLED | OUTPATIENT
Start: 2017-10-02

## 2017-10-02 RX ORDER — MULTIVITAMIN
1 TABLET ORAL DAILY
Qty: 90 TAB | Refills: 1 | Status: CANCELLED | OUTPATIENT
Start: 2017-10-02

## 2017-10-02 RX ORDER — DEXTROMETHORPHAN POLISTIREX 30 MG/5ML
60 SUSPENSION ORAL
Qty: 89 ML | Refills: 0 | Status: SHIPPED | OUTPATIENT
Start: 2017-10-02

## 2017-10-02 RX ORDER — LANOLIN ALCOHOL/MO/W.PET/CERES
500 CREAM (GRAM) TOPICAL DAILY
Qty: 90 TAB | Refills: 1 | Status: SHIPPED | OUTPATIENT
Start: 2017-10-02

## 2017-10-02 RX ORDER — LORATADINE 10 MG/1
10 TABLET ORAL
Qty: 90 TAB | Refills: 1 | Status: CANCELLED | OUTPATIENT
Start: 2017-10-02

## 2017-10-02 RX ORDER — CYANOCOBALAMIN (VITAMIN B-12) 500 MCG
400 TABLET ORAL DAILY
Qty: 90 TAB | Refills: 1 | Status: CANCELLED | OUTPATIENT
Start: 2017-10-02

## 2017-10-02 NOTE — TELEPHONE ENCOUNTER
Pharmacy on file verified  Requested Prescriptions     Pending Prescriptions Disp Refills    loratadine (CLARITIN) 10 mg tablet 90 Tab 1     Sig: Take 1 Tab by mouth daily as needed for Allergies.  cyanocobalamin (VITAMIN B-12) 500 mcg tablet 90 Tab 1     Sig: Take 1 Tab by mouth daily.  calcium-cholecalciferol, D3, (CALTRATE 600+D) tablet 90 Tab 1     Sig: Take 1 Tab by mouth daily.  cholecalciferol (VITAMIN D3) 400 unit tab tablet       Sig: Take 1 Tab by mouth two (2) times a day. Signed Prescriptions Disp Refills    loratadine (CLARITIN) 10 mg tablet 90 Tab 1     Sig: Take 1 Tab by mouth daily as needed for Allergies. Authorizing Provider: Imelda Lyles cyanocobalamin (VITAMIN B-12) 500 mcg tablet 90 Tab 1     Sig: Take 1 Tab by mouth daily. Authorizing Provider: Imelda Lyles calcium-cholecalciferol, D3, (CALTRATE 600+D) tablet 90 Tab 1     Sig: Take 1 Tab by mouth daily. Authorizing Provider: Imelda Lyles dextromethorphan (DELSYM) 30 mg/5 mL liquid 89 mL 0     Sig: Take 10 mL by mouth two (2) times daily as needed for Cough. Authorizing Provider: Ninoska Pierce    fluticasone (FLONASE) 50 mcg/actuation nasal spray 1 Bottle 3     Si Sprays by Both Nostrils route daily.      Authorizing Provider: Ninoska Pierce     Pharmacy is requesting a POS be signed

## 2017-10-02 NOTE — TELEPHONE ENCOUNTER
Already on Caltrate, no need for separate vitamin D rx as vitamin D is already in Caltrate. Please d/c med. Other refills sent.

## 2017-10-09 ENCOUNTER — OFFICE VISIT (OUTPATIENT)
Dept: RHEUMATOLOGY | Age: 77
End: 2017-10-09

## 2017-10-09 VITALS
RESPIRATION RATE: 18 BRPM | SYSTOLIC BLOOD PRESSURE: 136 MMHG | WEIGHT: 223.6 LBS | HEIGHT: 66 IN | HEART RATE: 58 BPM | DIASTOLIC BLOOD PRESSURE: 72 MMHG | BODY MASS INDEX: 35.93 KG/M2 | TEMPERATURE: 98.2 F | OXYGEN SATURATION: 95 %

## 2017-10-09 DIAGNOSIS — M85.859 OSTEOPENIA OF THIGH, UNSPECIFIED LATERALITY: ICD-10-CM

## 2017-10-09 DIAGNOSIS — Z23 ENCOUNTER FOR IMMUNIZATION: ICD-10-CM

## 2017-10-09 DIAGNOSIS — Z79.52 LONG TERM (CURRENT) USE OF SYSTEMIC STEROIDS: ICD-10-CM

## 2017-10-09 DIAGNOSIS — Z79.899 LONG-TERM USE OF PLAQUENIL: ICD-10-CM

## 2017-10-09 DIAGNOSIS — M35.3 POLYMYALGIA RHEUMATICA (HCC): Primary | ICD-10-CM

## 2017-10-09 NOTE — PATIENT INSTRUCTIONS
Prednisone 3 mg daily, alternating with 2 mg daily.  In 2 months, if doing well, decrease to 2 mg daily every day    Next month (November first) if doing well, lower Plaquenil (hydroxychloroquine) to 200 mg once daily    Flu shot was given today

## 2017-10-09 NOTE — MR AVS SNAPSHOT
Visit Information Date & Time Provider Department Dept. Phone Encounter #  
 10/9/2017 10:30 AM Chloe Garcia MD [address] [facility] [id number] Follow-up Instructions Return in about 6 months (around 4/9/2018). Your Appointments 12/5/2017  2:45 PM  
ROUTINE CARE with Abdi Caban MD  
[facility]) Appt Note: 6mo follow up  
 [address] [address]  
[phone]  
  
   
 [address] [address] Upcoming Health Maintenance Date Due  
 EYE EXAM RETINAL OR DILATED Q1 8/20/2015 GLAUCOMA SCREENING Q2Y 8/20/2016 LIPID PANEL Q1 11/10/2016 FOOT EXAM Q1 5/12/2017 MICROALBUMIN Q1 5/12/2017 INFLUENZA AGE 9 TO ADULT 8/1/2017 Pneumococcal 65+ Low/Medium Risk (2 of 2 - PPSV23) 9/9/2017 HEMOGLOBIN A1C Q6M 12/5/2017 MEDICARE YEARLY EXAM 12/7/2017 DTaP/Tdap/Td series (2 - Td) 9/18/2026 Allergies as of 10/9/2017  Review Complete On: 10/9/2017 By: Chloe Garcia MD  
 No Known Allergies Current Immunizations  Reviewed on 10/9/2017 Name Date Influenza High Dose Vaccine PF 9/21/2016 Influenza Vaccine 11/7/2013 Pneumococcal Vaccine (Unspecified Type) 9/9/2012 TB Skin Test (PPD) Intradermal 5/15/2015 Td 9/2/2012 Tdap 9/18/2016  5:39 PM  
  
 Reviewed by Sole Parmar LPN on 55/0/0853 at 50:94 AM  
 Reviewed by Sole Parmar LPN on 54/4/9488 at 39:27 AM  
 Reviewed by Sole Parmar LPN on 63/7/3519 at 57:79 AM  
 Reviewed by Chloe Garcia MD on 10/9/2017 at 10:55 AM  
You Were Diagnosed With   
  
 Codes Comments Polymyalgia rheumatica (HCC)    -  Primary ICD-10-CM: M35.3 ICD-9-CM: 823 Long term (current) use of systemic steroids     ICD-10-CM: Z79.52 
ICD-9-CM: V58.65 Osteopenia of thigh, unspecified laterality     ICD-10-CM: M85.859 ICD-9-CM: 733.90   
 Long-term use of Plaquenil     ICD-10-CM: Z79.899 ICD-9-CM: V58.69 Vitals BP Pulse Temp Resp Height(growth percentile) Weight(growth percentile) 136/72 (BP 1 Location: Right arm, BP Patient Position: Sitting) (!) 58 98.2 °F (36.8 °C) (Oral) 18 5' 6\" (1.676 m) 223 lb 9.6 oz (101.4 kg) SpO2 BMI OB Status Smoking Status 95% 36.09 kg/m2 Hysterectomy Former Smoker BMI and BSA Data Body Mass Index Body Surface Area 36.09 kg/m 2 2.17 m 2 Preferred Pharmacy Pharmacy Name Phone [facility], [address] [phone] Your Updated Medication List  
  
   
This list is accurate as of: 10/9/17 11:19 AM.  Always use your most recent med list.  
  
  
  
  
 acetaminophen 500 mg tablet Commonly known as:  TYLENOL Take 1 Tab by mouth every six (6) hours as needed for Pain. aspirin delayed-release 81 mg tablet Take  by mouth daily. calcium-cholecalciferol (D3) tablet Commonly known as:  CALTRATE 600+D Take 1 Tab by mouth daily. cyanocobalamin 500 mcg tablet Commonly known as:  VITAMIN B-12 Take 1 Tab by mouth daily. dextromethorphan 30 mg/5 mL liquid Commonly known as:  Delsym Take 10 mL by mouth two (2) times daily as needed for Cough. donepezil 10 mg tablet Commonly known as:  ARICEPT Take 10 mg by mouth nightly. FLUoxetine 20 mg capsule Commonly known as:  PROzac TAKE 3 CAPSULES BY MOUTH DAILY. fluticasone 50 mcg/actuation nasal spray Commonly known as:  Tracey Jumper 2 Sprays by Both Nostrils route daily. hydroxychloroquine 200 mg tablet Commonly known as:  PLAQUENIL Take 1 Tab by mouth two (2) times a day. loratadine 10 mg tablet Commonly known as:  Yaima Carlisle Take 1 Tab by mouth daily as needed for Allergies. * nystatin powder Commonly known as:  MYCOSTATIN Apply  to affected area four (4) times daily as needed. * nystatin topical cream  
Commonly known as:  MYCOSTATIN Apply  to affected area two (2) times a day. For intertrigo  
  
 predniSONE 1 mg tablet Commonly known as:  [address] 3 TABLETS BY MOUTH EVERY DAY  
  
 * Notice: This list has 2 medication(s) that are the same as other medications prescribed for you. Read the directions carefully, and ask your doctor or other care provider to review them with you. We Performed the Following RENAL FUNCTION PANEL [44726 CPT(R)] SED RATE (ESR) [id number] CPT(R)] VITAMIN D, 25 HYROXY PANEL [[id number] Custom] Follow-up Instructions Return in about 6 months (around 4/9/2018). Patient Instructions Prednisone 3 mg daily, alternating with 2 mg daily. In 2 months, if doing well, decrease to 2 mg daily every day Next month (November first) if doing well, lower Plaquenil (hydroxychloroquine) to 200 mg once daily Flu shot was given today Introducing [facility]! Dear Kate Natarajan: Thank you for requesting a GameTube account. Our records indicate that you already have an active GameTube account. You can access your account anytime at [email or web address] Did you know that you can access your hospital and ER discharge instructions at any time in GameTube? You can also review all of your test results from your hospital stay or ER visit. Additional Information If you have questions, please visit the Frequently Asked Questions section of the GameTube website at [email or web address] Remember, GameTube is NOT to be used for urgent needs. For medical emergencies, dial 911. Now available from your iPhone and Android! Please provide this summary of care documentation to your next provider. Your primary care clinician is listed as Ramin Casas. If you have any questions after today's visit, please call [phone].

## 2017-10-09 NOTE — PROGRESS NOTES
After obtaining consent, and per verbal order from , patient received influenza vaccine given by Kike Esteban LPN in  Right Deltoid. Influenza Vaccine 0.5 mL IM now. Patient was observed for 10 minutes post injection. Patient tolerated injection well. Wrong dose high dose of flu vaccine ordered, yet regular vaccine was given. 1. Have you been to the ER, urgent care clinic since your last visit? Hospitalized since your last visit?no    2. Have you seen or consulted any other health care providers outside of the [address] [place] [address] since your last visit? Include any pap smears or colon screening.  No

## 2017-10-09 NOTE — PROGRESS NOTES
HISTORY OF PRESENT ILLNESS  Alejandro Rachel is a 76 y.o. female. She presents with her daughter. HPI  Ms. Yolanda Gabriel presents with polymyalgia rheumatica. This is her first visit since 2015. She is currently at [facility] (memory care). She has no joint pain, though she has some stiffness in the shoulders. She has no joint swelling. No AM stiffness is noted. She has no headaches, significant visual change, or tongue or jaw claudication. She gets some regular group exercise. She remains at prednisone 3 mg daily. She continues to take Plaquenil 200 mg twice daily. She has not been taking alendronate for perhaps at least two years. She is taking calcium supplements and vitamin D daily. Current Outpatient Prescriptions   Medication Sig Dispense Refill    loratadine (CLARITIN) 10 mg tablet Take 1 Tab by mouth daily as needed for Allergies. 90 Tab 1    cyanocobalamin (VITAMIN B-12) 500 mcg tablet Take 1 Tab by mouth daily. 90 Tab 1    calcium-cholecalciferol, D3, (CALTRATE 600+D) tablet Take 1 Tab by mouth daily. 90 Tab 1    dextromethorphan (DELSYM) 30 mg/5 mL liquid Take 10 mL by mouth two (2) times daily as needed for Cough. 89 mL 0    fluticasone (FLONASE) 50 mcg/actuation nasal spray 2 Sprays by Both Nostrils route daily. 1 Bottle 3    nystatin (MYCOSTATIN) topical cream Apply  to affected area two (2) times a day. For intertrigo (Patient taking differently: Apply  to affected area as needed. For intertrigo) 30 g 2    acetaminophen (TYLENOL) 500 mg tablet Take 1 Tab by mouth every six (6) hours as needed for Pain. 90 Tab 0    aspirin delayed-release 81 mg tablet Take  by mouth daily.  nystatin (MYCOSTATIN) powder Apply  to affected area four (4) times daily as needed. 60 g 4    donepezil (ARICEPT) 10 mg tablet Take 10 mg by mouth nightly.  FLUoxetine (PROZAC) 20 mg capsule TAKE 3 CAPSULES BY MOUTH DAILY.  90 Cap 1    predniSONE (DELTASONE) 1 mg tablet TAKE 3 TABLETS BY MOUTH EVERY DAY 90 tablet 5    hydroxychloroquine (PLAQUENIL) 200 mg tablet Take 1 Tab by mouth two (2) times a day. 180 Tab 3     No Known Allergies    Review of Systems   Reason unable to perform ROS: other-daughter declined. Physical Exam   Vitals reviewed. GENERAL: well developed, well nourished, in no apparent distress   E/N/T: Lips/Teeth/Gums: full dentures; Oropharynx: dry mucosa;    NECK: Neck is supple with full range of motion;   RESPIRATORY: lungs clear with BS=B/L   CARDIOVASCULAR: regular rhythm; no edema; temporal arteries non-tender and with normal pulsations   GASTROINTESTINAL: nontender;    LYMPHATIC: no enlargement of cervical nodes;   MUSCULOSKELETAL: gait: using walker  knees 90 degrees flexion; shoulders full flexion with abduction to 110 degrees  -tenderness left shoulder; no peripheral synovitis  SKIN: bruising diffusely over the arms  PSYCHIATRIC: mental status: alert     Lab Results   Component Value Date/Time    WBC 11.3 09/18/2016 05:26 PM    HGB 13.1 09/18/2016 05:26 PM    HCT 39.9 09/18/2016 05:26 PM    PLATELET 908 85/73/6362 05:26 PM    MCV 95.0 09/18/2016 05:26 PM     Lab Results   Component Value Date/Time    Sed rate (ESR) 7 01/29/2015 03:41 PM     Lab Results   Component Value Date/Time    Sodium 137 09/18/2016 05:26 PM    Potassium 4.0 09/18/2016 05:26 PM    Chloride 104 09/18/2016 05:26 PM    CO2 25 09/18/2016 05:26 PM    Anion gap 8 09/18/2016 05:26 PM    Glucose 110 09/18/2016 05:26 PM    BUN 23 09/18/2016 05:26 PM    Creatinine 0.93 09/18/2016 05:26 PM    BUN/Creatinine ratio 25 09/18/2016 05:26 PM    GFR est AA >60 09/18/2016 05:26 PM    GFR est non-AA 58 09/18/2016 05:26 PM    Calcium 9.1 09/18/2016 05:26 PM    Bilirubin, total 0.2 09/18/2016 05:26 PM    AST (SGOT) 14 09/18/2016 05:26 PM    Alk.  phosphatase 82 09/18/2016 05:26 PM    Protein, total 6.9 09/18/2016 05:26 PM    Albumin 3.4 09/18/2016 05:26 PM    Globulin 3.5 09/18/2016 05:26 PM    A-G Ratio 1.0 09/18/2016 05:26 PM ALT (SGPT) 21 09/18/2016 05:26 PM         ASSESSMENT and PLAN    ICD-10-CM ICD-9-CM    1. Polymyalgia rheumatica ([address]): marked response to prednisone. Flare at lower doses. Presence of hand synovitis 2013, though possible with PMR, may be rather associated with an RA like illness. RF and CCP negative. She has tolerated Plaquenil 200 mg BID with benefit. She has remained at 3 mg daily prednisone since last visit 2015. Overall well at present vis-a-vis PMR. M35.3 725 SED RATE (ESR)  Prednisone 3 mg daily, alternating with 2 mg daily. In 2 months, if doing well, decrease to 2 mg daily every day    On November first, if doing well, lower Plaquenil (hydroxychloroquine) to 200 mg once daily     2. Long term (current) use of systemic steroids: 3 mg daily since last visit Z79.52 V58.65 See below   3. Osteopenia of thigh, unspecified laterality: DXA 11/10/2011 T score -1.2 Femoral neck (prior alendronate and then risedronate; no recent therapy noted). M85.859 733.90 RENAL FUNCTION PANEL  -calcium 1200 mg daily, preferably through healthy diet  -vitamin D3 1985-4340 units daily  -reviewed rationale for evaluation (DXA) and consideration of therapy if needed with her daughter: DXA or further evaluation or treatment declined at present      VITAMIN D, 25 HYROXY PANEL   4. Long-term use of Plaquenil Z79.899 V58.69 Eye monitoring encouraged   5.  Encounter for immunization Z23 V03.89 INFLUENZA VIRUS VACCINE       ADMIN INFLUENZA VIRUS VAC

## 2017-10-13 LAB
25(OH)D2 SERPL-MCNC: 1.1 NG/ML
25(OH)D3 SERPL-MCNC: 41 NG/ML
25(OH)D3+25(OH)D2 SERPL-MCNC: 42 NG/ML
ALBUMIN SERPL-MCNC: 3.7 G/DL (ref 3.5–4.8)
BUN SERPL-MCNC: 20 MG/DL (ref 8–27)
BUN/CREAT SERPL: 23 (ref 12–28)
CALCIUM SERPL-MCNC: 9.4 MG/DL (ref 8.7–10.3)
CHLORIDE SERPL-SCNC: 97 MMOL/L (ref 96–106)
CO2 SERPL-SCNC: 28 MMOL/L (ref 18–29)
CREAT SERPL-MCNC: 0.86 MG/DL (ref 0.57–1)
ERYTHROCYTE [SEDIMENTATION RATE] IN BLOOD BY WESTERGREN METHOD: 10 MM/HR (ref 0–40)
GLUCOSE SERPL-MCNC: 166 MG/DL (ref 65–99)
PHOSPHATE SERPL-MCNC: 3.2 MG/DL (ref 2.5–4.5)
POTASSIUM SERPL-SCNC: 4.6 MMOL/L (ref 3.5–5.2)
SODIUM SERPL-SCNC: 140 MMOL/L (ref 134–144)

## 2017-12-01 ENCOUNTER — TELEPHONE (OUTPATIENT)
Dept: FAMILY MEDICINE CLINIC | Age: 77
End: 2017-12-01

## 2017-12-01 DIAGNOSIS — R46.89 ABNORMAL BEHAVIOR: ICD-10-CM

## 2017-12-01 DIAGNOSIS — R53.83 LETHARGY: Primary | ICD-10-CM

## 2017-12-01 NOTE — TELEPHONE ENCOUNTER
Patients daughter, Robert Thompsonchristofer is calling, she is requesting that Dr. Le Raza put in an order at [facility] for the patient for a urinalysis as she believes that she may have a UTI. She states that the patient is really slowing down, hard time getting in and out of chair (normally no issues), and these are normally signs of her mother having a UTI. She states that [facility] come to the building on Friday for pickups so if this order can be sent over as soon as possible she would appreciate it.      Best call back # for Desmondkrishanleonor Jaychristofer: [phone]

## 2017-12-01 NOTE — TELEPHONE ENCOUNTER
Order faxed to facility stating if pt's condition worsens send to ED    [facility] notified ([facility] living facility).     Left message for daughter to call back office

## 2017-12-18 ENCOUNTER — TELEPHONE (OUTPATIENT)
Dept: FAMILY MEDICINE CLINIC | Age: 77
End: 2017-12-18

## 2017-12-18 ENCOUNTER — HOSPITAL ENCOUNTER (EMERGENCY)
Age: 77
Discharge: HOME | End: 2017-12-18
Attending: EMERGENCY MEDICINE
Payer: MEDICARE

## 2017-12-18 VITALS
HEART RATE: 60 BPM | OXYGEN SATURATION: 98 % | WEIGHT: 225 LBS | SYSTOLIC BLOOD PRESSURE: 139 MMHG | BODY MASS INDEX: 36.16 KG/M2 | TEMPERATURE: 98.6 F | RESPIRATION RATE: 16 BRPM | DIASTOLIC BLOOD PRESSURE: 43 MMHG | HEIGHT: 66 IN
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
## 2017-12-18 PROCEDURE — 99283 EMERGENCY DEPT VISIT LOW MDM: CPT

## 2017-12-18 NOTE — TELEPHONE ENCOUNTER
Call to [facility] regarding fax received from facility stating patient was found on the floor around 230am with pillow and blanket, vitals were stable, patient declined pain, ROM was performed. Spoke with pcp verbally about fax. She stated patient needs to be evaluated at ED for 2nd episode of unwitnessed fall/loss of consciouness, last episdoe this morning. evaluate possible head injury vs seizure vs cardiac etiology      Spoke with [facility] nursing supervisor. Advised her to send patient out to ED. She verbalized understanding. Attempted to reach daughters on hippa for multiple times in regards to this.  Left message to call office back

## 2017-12-19 ENCOUNTER — PATIENT OUTREACH (OUTPATIENT)
Dept: FAMILY MEDICINE CLINIC | Age: 77
End: 2017-12-19

## 2017-12-19 NOTE — TELEPHONE ENCOUNTER
Call to Santana Mckeon on hippa form.  verified. She informed me that she is upset that we did not leave a detailed voicemail on her phone and that we only called her once. She wanted to know when we reached out to her after 5pm. She inquired why we sent her mother to the ED since she was fine and she visited her earlier in the afternoon. I advised Ms. Arnold that we received a fax in regards to the patient's incident and condition and based on the hx of recurrent unwitnessed falls at this facility we wanted to rule out possible head injury vs seizure vs cardiac etiology. Advised that we have requested multiple times for this facility to not send a fax and to call immediately if there is an urgent matter in regards to patient. Advised even though patient seemed fine there still could have been possible underlying issues given that we do not know how she fell nor how long she was on the floor. Advised I tried to call her twice with voicemail and I also tried to reach her sister once. She stated that patient went to ED and was looked over quickly and was sent home. Inquired if she would like to speak with pcp directly twice. She stated that we will no longer have care in patient.
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
## 2017-12-19 NOTE — PROGRESS NOTES
Patient on [facility], went to [facility] ED 12/18 after unwitnessed fall from bed. Patient denied any complaints. Alert and oriented but does not remember falling. Patient saw the facility([facility]) physician who recommended ED for evaluation. VSS, family did not want patient seen in ED and were not consulted prior to her arriving at ED. Patient was discharged back to [facility]. Advised f/u with pcp prn. Called daughter,Nimisha, to discuss above. LM requesting she call me back. Will continue to try and reach.

## 2017-12-19 NOTE — TELEPHONE ENCOUNTER
----- Message from Timoteo Bowen sent at 12/18/2017  5:56 PM EST -----  Regarding: Dr. Cynthia Dewey  Ms. Arnold, pt's daughter, requesting to speak with the nurse in regards to why pt was instructed to go to ER today. Ms Yoan Guo stated, pt is doing fine walking and moving just fine. The staff at [facility] check pt and seen everything is fine. Pt is on the way to the [facility] [place] ER. Ms. Yoan Guo is not pleased with the office sending pt to ER with out consulting CAL Ms. Yoan Guo. Best contact number [phone].
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
## 2017-12-19 NOTE — TELEPHONE ENCOUNTER
Was informed on afternoon of 12/18 by JUAN MANUEL Estrada Seen regarding a fax from [facility] that was received by our office that pt was found on the floor, unwitnessed, and pt unable to explain what happened. This is the 2nd episode of unwitnessed fall/injury at [facility] notified via fax; previously requested that all episodes that require medical attention be communicated via phone, not fax. At the time, I recommended pt for ER evaluation given unwitnessed fall to r/o seizure, cardiac etiology, and head injury. Reviewed that [facility] [place] ER visit on 12/18. Pt's POA/daughter spoke with LPN this morning (see TE note). I have also called POA myself and left a voicemail to allow any further questions that she may have at this time.

## 2017-12-20 ENCOUNTER — TELEPHONE (OUTPATIENT)
Dept: FAMILY MEDICINE CLINIC | Age: 77
End: 2017-12-20

## 2017-12-20 NOTE — TELEPHONE ENCOUNTER
Chief Complaint   Patient presents with    Request For New Medication     Immodium ( PRN ) due to patient having several episodes of loose stools over the past week. Received fax from [facility] on 12/20/17 requesting PRN order for Immodium due to patients episodes of loose stool for over the past week. Notes documented that per daughter Nelia [place], \" our office will no longer have care in patient . \"  Form faxed to [phone] with confirmation received.

## 2017-12-21 NOTE — TELEPHONE ENCOUNTER
Would like clarification from pt's POA if she wishes me to make medical decisions for pt as POA previously stated she did not want us involved in her care; will await POA response before additional medical advice. Will not order Imodium.

## 2018-01-29 ENCOUNTER — APPOINTMENT (OUTPATIENT)
Dept: GENERAL RADIOLOGY | Age: 78
DRG: 065 | End: 2018-01-29
Attending: EMERGENCY MEDICINE
Payer: MEDICARE

## 2018-01-29 ENCOUNTER — HOSPITAL ENCOUNTER (INPATIENT)
Age: 78
LOS: 3 days | Discharge: HOME | DRG: 065 | End: 2018-02-01
Attending: EMERGENCY MEDICINE | Admitting: FAMILY MEDICINE
Payer: MEDICARE

## 2018-01-29 ENCOUNTER — APPOINTMENT (OUTPATIENT)
Dept: CT IMAGING | Age: 78
DRG: 065 | End: 2018-01-29
Attending: EMERGENCY MEDICINE
Payer: MEDICARE

## 2018-01-29 DIAGNOSIS — R29.898 WEAKNESS OF LEFT LOWER EXTREMITY: Primary | ICD-10-CM

## 2018-01-29 DIAGNOSIS — G30.8 ALZHEIMER'S DISEASE OF OTHER ONSET WITHOUT BEHAVIORAL DISTURBANCE: ICD-10-CM

## 2018-01-29 DIAGNOSIS — F02.80 ALZHEIMER'S DISEASE OF OTHER ONSET WITHOUT BEHAVIORAL DISTURBANCE: ICD-10-CM

## 2018-01-29 DIAGNOSIS — I63.00 CEREBROVASCULAR ACCIDENT (CVA) DUE TO THROMBOSIS OF PRECEREBRAL ARTERY (HCC): ICD-10-CM

## 2018-01-29 DIAGNOSIS — D72.829 LEUKOCYTOSIS, UNSPECIFIED TYPE: ICD-10-CM

## 2018-01-29 PROBLEM — E86.0 DEHYDRATION: Status: ACTIVE | Noted: 2018-01-29

## 2018-01-29 LAB
ALBUMIN SERPL-MCNC: 2.9 G/DL (ref 3.5–5)
ALBUMIN/GLOB SERPL: 0.7 {RATIO} (ref 1.1–2.2)
ALP SERPL-CCNC: 88 U/L (ref 45–117)
ALT SERPL-CCNC: 19 U/L (ref 12–78)
ANION GAP SERPL CALC-SCNC: 7 MMOL/L (ref 5–15)
APPEARANCE UR: CLEAR
APTT PPP: 29.2 SEC (ref 22.1–32.5)
AST SERPL-CCNC: 13 U/L (ref 15–37)
BACTERIA URNS QL MICRO: NEGATIVE /HPF
BASOPHILS # BLD: 0.1 K/UL (ref 0–0.1)
BASOPHILS NFR BLD: 0 % (ref 0–1)
BILIRUB SERPL-MCNC: 0.6 MG/DL (ref 0.2–1)
BILIRUB UR QL: NEGATIVE
BUN SERPL-MCNC: 22 MG/DL (ref 6–20)
BUN/CREAT SERPL: 21 (ref 12–20)
CALCIUM SERPL-MCNC: 8.9 MG/DL (ref 8.5–10.1)
CHLORIDE SERPL-SCNC: 100 MMOL/L (ref 97–108)
CK SERPL-CCNC: 51 U/L (ref 26–192)
CO2 SERPL-SCNC: 29 MMOL/L (ref 21–32)
COLOR UR: ABNORMAL
CREAT SERPL-MCNC: 1.03 MG/DL (ref 0.55–1.02)
DIFFERENTIAL METHOD BLD: ABNORMAL
EOSINOPHIL # BLD: 0 K/UL (ref 0–0.4)
EOSINOPHIL NFR BLD: 0 % (ref 0–7)
EPITH CASTS URNS QL MICRO: ABNORMAL /LPF
ERYTHROCYTE [DISTWIDTH] IN BLOOD BY AUTOMATED COUNT: 13.2 % (ref 11.5–14.5)
GLOBULIN SER CALC-MCNC: 4.1 G/DL (ref 2–4)
GLUCOSE SERPL-MCNC: 194 MG/DL (ref 65–100)
GLUCOSE UR STRIP.AUTO-MCNC: NEGATIVE MG/DL
HCT VFR BLD AUTO: 40.4 % (ref 35–47)
HGB BLD-MCNC: 13.5 G/DL (ref 11.5–16)
HGB UR QL STRIP: NEGATIVE
IMM GRANULOCYTES # BLD: 0.1 K/UL (ref 0–0.04)
IMM GRANULOCYTES NFR BLD AUTO: 1 % (ref 0–0.5)
INR PPP: 1.1 (ref 0.9–1.1)
KETONES UR QL STRIP.AUTO: NEGATIVE MG/DL
LACTATE SERPL-SCNC: 1.2 MMOL/L (ref 0.4–2)
LEUKOCYTE ESTERASE UR QL STRIP.AUTO: NEGATIVE
LYMPHOCYTES # BLD: 2.5 K/UL (ref 0.8–3.5)
LYMPHOCYTES NFR BLD: 13 % (ref 12–49)
MCH RBC QN AUTO: 31.8 PG (ref 26–34)
MCHC RBC AUTO-ENTMCNC: 33.4 G/DL (ref 30–36.5)
MCV RBC AUTO: 95.3 FL (ref 80–99)
MONOCYTES # BLD: 1.6 K/UL (ref 0–1)
MONOCYTES NFR BLD: 8 % (ref 5–13)
MUCOUS THREADS URNS QL MICRO: ABNORMAL /LPF
NEUTS SEG # BLD: 15.2 K/UL (ref 1.8–8)
NEUTS SEG NFR BLD: 78 % (ref 32–75)
NITRITE UR QL STRIP.AUTO: NEGATIVE
NRBC # BLD: 0 K/UL (ref 0–0.01)
NRBC BLD-RTO: 0 PER 100 WBC
PH UR STRIP: 6 [PH] (ref 5–8)
PLATELET # BLD AUTO: 286 K/UL (ref 150–400)
PMV BLD AUTO: 9.4 FL (ref 8.9–12.9)
POTASSIUM SERPL-SCNC: 4.4 MMOL/L (ref 3.5–5.1)
PROT SERPL-MCNC: 7 G/DL (ref 6.4–8.2)
PROT UR STRIP-MCNC: ABNORMAL MG/DL
PROTHROMBIN TIME: 10.9 SEC (ref 9–11.1)
RBC # BLD AUTO: 4.24 M/UL (ref 3.8–5.2)
RBC #/AREA URNS HPF: ABNORMAL /HPF (ref 0–5)
SODIUM SERPL-SCNC: 136 MMOL/L (ref 136–145)
SP GR UR REFRACTOMETRY: 1.02 (ref 1–1.03)
THERAPEUTIC RANGE,PTTT: NORMAL SECS (ref 58–77)
TROPONIN I SERPL-MCNC: <0.04 NG/ML
UA: UC IF INDICATED,UAUC: ABNORMAL
UROBILINOGEN UR QL STRIP.AUTO: 0.2 EU/DL (ref 0.2–1)
WBC # BLD AUTO: 19.5 K/UL (ref 3.6–11)
WBC URNS QL MICRO: ABNORMAL /HPF (ref 0–4)

## 2018-01-29 PROCEDURE — 71045 X-RAY EXAM CHEST 1 VIEW: CPT

## 2018-01-29 PROCEDURE — 77030011943

## 2018-01-29 PROCEDURE — 80053 COMPREHEN METABOLIC PANEL: CPT | Performed by: EMERGENCY MEDICINE

## 2018-01-29 PROCEDURE — 99284 EMERGENCY DEPT VISIT MOD MDM: CPT

## 2018-01-29 PROCEDURE — 74011250636 HC RX REV CODE- 250/636: Performed by: EMERGENCY MEDICINE

## 2018-01-29 PROCEDURE — 70450 CT HEAD/BRAIN W/O DYE: CPT

## 2018-01-29 PROCEDURE — 85025 COMPLETE CBC W/AUTO DIFF WBC: CPT | Performed by: EMERGENCY MEDICINE

## 2018-01-29 PROCEDURE — 84484 ASSAY OF TROPONIN QUANT: CPT | Performed by: EMERGENCY MEDICINE

## 2018-01-29 PROCEDURE — 83605 ASSAY OF LACTIC ACID: CPT | Performed by: FAMILY MEDICINE

## 2018-01-29 PROCEDURE — 65660000000 HC RM CCU STEPDOWN

## 2018-01-29 PROCEDURE — 87040 BLOOD CULTURE FOR BACTERIA: CPT | Performed by: FAMILY MEDICINE

## 2018-01-29 PROCEDURE — 93005 ELECTROCARDIOGRAM TRACING: CPT

## 2018-01-29 PROCEDURE — 81001 URINALYSIS AUTO W/SCOPE: CPT | Performed by: EMERGENCY MEDICINE

## 2018-01-29 PROCEDURE — 82550 ASSAY OF CK (CPK): CPT | Performed by: EMERGENCY MEDICINE

## 2018-01-29 PROCEDURE — 86901 BLOOD TYPING SEROLOGIC RH(D): CPT | Performed by: FAMILY MEDICINE

## 2018-01-29 PROCEDURE — 85730 THROMBOPLASTIN TIME PARTIAL: CPT | Performed by: EMERGENCY MEDICINE

## 2018-01-29 PROCEDURE — 36415 COLL VENOUS BLD VENIPUNCTURE: CPT | Performed by: EMERGENCY MEDICINE

## 2018-01-29 PROCEDURE — 85610 PROTHROMBIN TIME: CPT | Performed by: EMERGENCY MEDICINE

## 2018-01-29 RX ORDER — ACETAMINOPHEN 325 MG/1
650 TABLET ORAL
Status: DISCONTINUED | OUTPATIENT
Start: 2018-01-29 | End: 2018-02-01 | Stop reason: HOSPADM

## 2018-01-29 RX ORDER — NYSTATIN 100000 [USP'U]/G
POWDER TOPICAL DAILY
Status: ON HOLD | COMMUNITY
End: 2020-11-20

## 2018-01-29 RX ORDER — SODIUM CHLORIDE 0.9 % (FLUSH) 0.9 %
5-10 SYRINGE (ML) INJECTION EVERY 8 HOURS
Status: DISCONTINUED | OUTPATIENT
Start: 2018-01-29 | End: 2018-02-01 | Stop reason: HOSPADM

## 2018-01-29 RX ORDER — SODIUM CHLORIDE 9 MG/ML
125 INJECTION, SOLUTION INTRAVENOUS CONTINUOUS
Status: DISCONTINUED | OUTPATIENT
Start: 2018-01-29 | End: 2018-01-30

## 2018-01-29 RX ORDER — ACETAMINOPHEN 650 MG/1
650 SUPPOSITORY RECTAL
Status: DISCONTINUED | OUTPATIENT
Start: 2018-01-29 | End: 2018-02-01 | Stop reason: HOSPADM

## 2018-01-29 RX ORDER — MULTIVITAMIN
1 TABLET ORAL 2 TIMES DAILY
COMMUNITY

## 2018-01-29 RX ORDER — SODIUM CHLORIDE 0.9 % (FLUSH) 0.9 %
5-10 SYRINGE (ML) INJECTION AS NEEDED
Status: DISCONTINUED | OUTPATIENT
Start: 2018-01-29 | End: 2018-02-01 | Stop reason: HOSPADM

## 2018-01-29 RX ORDER — SODIUM CHLORIDE 9 MG/ML
75 INJECTION, SOLUTION INTRAVENOUS CONTINUOUS
Status: DISCONTINUED | OUTPATIENT
Start: 2018-01-29 | End: 2018-02-01 | Stop reason: HOSPADM

## 2018-01-29 RX ADMIN — SODIUM CHLORIDE 1000 ML: 900 INJECTION, SOLUTION INTRAVENOUS at 22:15

## 2018-01-29 NOTE — IP AVS SNAPSHOT
[address] [facility] [address] [place] [address] 
[phone] Patient: Kathy Marquez MRN: [id number] GDU:[birth year] A check alissa indicates which time of day the medication should be taken. My Medications START taking these medications Instructions Each Dose to Equal  
 Morning Noon Evening Bedtime  
 clopidogrel 75 mg Tab Commonly known as:  PLAVIX Start taking on:  2/2/2018 Your last dose was: Your next dose is: Take 1 Tab by mouth daily for 30 days. 75 mg Lactobacillus acidophilus 460 mg (20 billion cell) Cap Commonly known as:  Manolo Conine Your last dose was: Your next dose is: Take 1 Cap by mouth daily. 1 Cap  
    
   
   
   
  
 rosuvastatin 10 mg tablet Commonly known as:  CRESTOR Your last dose was: Your next dose is: Take 1 Tab by mouth nightly for 30 days. 10 mg CHANGE how you take these medications Instructions Each Dose to Equal  
 Morning Noon Evening Bedtime * nystatin powder Commonly known as:  MYCOSTATIN What changed:  Another medication with the same name was changed. Make sure you understand how and when to take each. Your last dose was: Your next dose is:    
   
   
 daily. Under stomach folds * nystatin topical cream  
Commonly known as:  MYCOSTATIN What changed:   
- when to take this 
- reasons to take this 
- additional instructions Your last dose was: Your next dose is:    
   
   
 Apply  to affected area two (2) times a day. For intertrigo * Notice: This list has 2 medication(s) that are the same as other medications prescribed for you. Read the directions carefully, and ask your doctor or other care provider to review them with you. CONTINUE taking these medications Instructions Each Dose to Equal  
 Morning Noon Evening Bedtime  
 acetaminophen 500 mg tablet Commonly known as:  TYLENOL Your last dose was: Your next dose is: Take 1 Tab by mouth every six (6) hours as needed for Pain. 500 mg  
    
   
   
   
  
 calcium-cholecalciferol (D3) tablet Commonly known as:  CALTRATE 600+D Your last dose was: Your next dose is: Take 1 Tab by mouth two (2) times a day. 1 Tab  
    
   
   
   
  
 cyanocobalamin 500 mcg tablet Commonly known as:  VITAMIN B-12 Your last dose was: Your next dose is: Take 1 Tab by mouth daily. 500 mcg  
    
   
   
   
  
 dextromethorphan 30 mg/5 mL liquid Commonly known as:  Delsym Your last dose was: Your next dose is: Take 10 mL by mouth two (2) times daily as needed for Cough. 60 mg  
    
   
   
   
  
 donepezil 10 mg tablet Commonly known as:  ARICEPT Your last dose was: Your next dose is: Take 10 mg by mouth nightly. 10 mg FLUoxetine 20 mg capsule Commonly known as:  PROzac Your last dose was: Your next dose is: TAKE 3 CAPSULES BY MOUTH DAILY. fluticasone 50 mcg/actuation nasal spray Commonly known as:  Kathie Manners Your last dose was: Your next dose is: 2 Sprays by Both Nostrils route daily. 2 Spray  
    
   
   
   
  
 hydroxychloroquine 200 mg tablet Commonly known as:  PLAQUENIL Your last dose was: Your next dose is: Take 1 Tab by mouth two (2) times a day. 200 mg  
    
   
   
   
  
 loratadine 10 mg tablet Commonly known as:  Santos Bread Your last dose was: Your next dose is: Take 1 Tab by mouth daily as needed for Allergies. 10 mg  
    
   
   
   
  
 predniSONE 1 mg tablet Commonly known as:  Brisa Preciado  
   
 Your last dose was: Your next dose is: TAKE 3 TABLETS BY MOUTH EVERY DAY  
     
   
   
   
  
 psyllium packet Commonly known as:  METAMUCIL Your last dose was: Your next dose is: Take 1 Packet by mouth two (2) times daily as needed. 1 Packet STOP taking these medications   
 aspirin delayed-release 81 mg tablet Where to Get Your Medications Information on where to get these meds will be given to you by the nurse or doctor. ! Ask your nurse or doctor about these medications  
  clopidogrel 75 mg Tab Lactobacillus acidophilus 460 mg (20 billion cell) Cap  
 rosuvastatin 10 mg tablet

## 2018-01-29 NOTE — IP AVS SNAPSHOT
[address] 
[phone] Patient: Moe Montes De Oca MRN: [id number] MLJ:[birth year] About your hospitalization You were admitted on:  January 29, 2018 You last received care in the:  [address] [place] [address] NEURO-SCI TELE You were discharged on:  February 1, 2018 Why you were hospitalized Your primary diagnosis was:  Left Hemiparesis (Hcc) Your diagnoses also included:  Dehydration, Leukocytosis, Acute Ischemic Stroke (Hcc) Follow-up Information Follow up With Details Comments Contact Info Suzie Skelton MD   [facility] 
[phone] Discharge Orders None A check alissa indicates which time of day the medication should be taken. My Medications START taking these medications Instructions Each Dose to Equal  
 Morning Noon Evening Bedtime  
 clopidogrel 75 mg Tab Commonly known as:  PLAVIX Start taking on:  2/2/2018 Your last dose was: Your next dose is: Take 1 Tab by mouth daily for 30 days. 75 mg Lactobacillus acidophilus 460 mg (20 billion cell) Cap Commonly known as:  Delynn Starling Your last dose was: Your next dose is: Take 1 Cap by mouth daily. 1 Cap  
    
   
   
   
  
 rosuvastatin 10 mg tablet Commonly known as:  CRESTOR Your last dose was: Your next dose is: Take 1 Tab by mouth nightly for 30 days. 10 mg CHANGE how you take these medications Instructions Each Dose to Equal  
 Morning Noon Evening Bedtime * nystatin powder Commonly known as:  MYCOSTATIN What changed:  Another medication with the same name was changed. Make sure you understand how and when to take each. Your last dose was: Your next dose is:    
   
   
 daily. Under stomach folds * nystatin topical cream  
Commonly known as:  MYCOSTATIN What changed:   
- when to take this 
- reasons to take this 
- additional instructions Your last dose was: Your next dose is:    
   
   
 Apply  to affected area two (2) times a day. For intertrigo * Notice: This list has 2 medication(s) that are the same as other medications prescribed for you. Read the directions carefully, and ask your doctor or other care provider to review them with you. CONTINUE taking these medications Instructions Each Dose to Equal  
 Morning Noon Evening Bedtime  
 acetaminophen 500 mg tablet Commonly known as:  TYLENOL Your last dose was: Your next dose is: Take 1 Tab by mouth every six (6) hours as needed for Pain. 500 mg  
    
   
   
   
  
 calcium-cholecalciferol (D3) tablet Commonly known as:  CALTRATE 600+D Your last dose was: Your next dose is: Take 1 Tab by mouth two (2) times a day. 1 Tab  
    
   
   
   
  
 cyanocobalamin 500 mcg tablet Commonly known as:  VITAMIN B-12 Your last dose was: Your next dose is: Take 1 Tab by mouth daily. 500 mcg  
    
   
   
   
  
 dextromethorphan 30 mg/5 mL liquid Commonly known as:  Delsym Your last dose was: Your next dose is: Take 10 mL by mouth two (2) times daily as needed for Cough. 60 mg  
    
   
   
   
  
 donepezil 10 mg tablet Commonly known as:  ARICEPT Your last dose was: Your next dose is: Take 10 mg by mouth nightly. 10 mg FLUoxetine 20 mg capsule Commonly known as:  PROzac Your last dose was: Your next dose is: TAKE 3 CAPSULES BY MOUTH DAILY. fluticasone 50 mcg/actuation nasal spray Commonly known as:  Jacki Michael Your last dose was: Your next dose is: 2 Sprays by Both Nostrils route daily. 2 Spray  
    
   
   
   
  
 hydroxychloroquine 200 mg tablet Commonly known as:  PLAQUENIL Your last dose was: Your next dose is: Take 1 Tab by mouth two (2) times a day. 200 mg  
    
   
   
   
  
 loratadine 10 mg tablet Commonly known as:  Rollene Ranks Your last dose was: Your next dose is: Take 1 Tab by mouth daily as needed for Allergies. 10 mg  
    
   
   
   
  
 predniSONE 1 mg tablet Commonly known as:  Jackquelyn Mellow Your last dose was: Your next dose is: TAKE 3 TABLETS BY MOUTH EVERY DAY  
     
   
   
   
  
 psyllium packet Commonly known as:  METAMUCIL Your last dose was: Your next dose is: Take 1 Packet by mouth two (2) times daily as needed. 1 Packet STOP taking these medications   
 aspirin delayed-release 81 mg tablet Where to Get Your Medications Information on where to get these meds will be given to you by the nurse or doctor. ! Ask your nurse or doctor about these medications  
  clopidogrel 75 mg Tab Lactobacillus acidophilus 460 mg (20 billion cell) Cap  
 rosuvastatin 10 mg tablet Discharge Instructions Discharge Instructions PATIENT ID: Gigi Roman MRN: [id number] YOB: 1940 DATE OF ADMISSION: 1/29/2018  7:19 PM   
DATE OF DISCHARGE: 2/1/2018 PRIMARY CARE PROVIDER: Elliot Nguyễn MD  
 
ATTENDING PHYSICIAN: Mimi Nugent MD 
DISCHARGING PROVIDER: Mimi Nugent MD   
To contact this individual call [phone] and ask the  to page. If unavailable ask to be transferred the Adult Hospitalist Department. DISCHARGE DIAGNOSES and CARE RECOMMENDATIONS:  
Left sided weakness due likely to ischemic stroke:aspirin switched to Plavix. Bradycardia,asymptomatic:no HR slowing medications except Aricept. Patient has no symptoms from the slow heart rate and Hr is >50. If slow HR causes problem or goes lower than 40-50,may consider stopping or decreasing dose of Aricept and have her evaluated by cardiologist. 
 
Dehydration: she received intravenous fluid and dehydration is resolved. Please encourage to take enough fluids,she needs to be reminded frequently due top her dementia. Diarrhea:C diff negative. Diarrhea has slowed down. Take probiotics. CONSULTATIONS: IP CONSULT TO HOSPITALIST 
IP CONSULT TO NEUROLOGY PROCEDURES/SURGERIES: * No surgery found * PENDING TEST RESULTS:  
At the time of discharge the following test results are still pending: none FOLLOW UP APPOINTMENTS:  
Follow-up Information Follow up With Details Comments Contact Info Elliot Nguyễn MD   [facility] 
[phone] DIET: Cardiac Diet ACTIVITY: Activity as tolerated WOUND CARE: NA 
 
EQUIPMENT needed: NA 
 
 
DISCHARGE MEDICATIONS: 
 See Medication Reconciliation Form · It is important that you take the medication exactly as they are prescribed. · Keep your medication in the bottles provided by the pharmacist and keep a list of the medication names, dosages, and times to be taken in your wallet. · Do not take other medications without consulting your doctor. NOTIFY YOUR PHYSICIAN FOR ANY OF THE FOLLOWING:  
Fever over 101 degrees for 24 hours. Chest pain, shortness of breath, fever, chills, nausea, vomiting, diarrhea, change in mentation, falling, weakness, bleeding. Severe pain or pain not relieved by medications. Or, any other signs or symptoms that you may have questions about. DISPOSITION: 
x  Home With:[facility] OT  PT  [facility]  RN  
  
 SNF/Inpatient Rehab/LTAC Independent/assisted living Hospice Other:  
 
 
 
 
Signed:   
Mimi Nugent MD 
 2/1/2018 
2:58 PM 
 
ACO Transitions of Care Introducing [employer] [address] Jaqueline Salazar offers a voluntary care coordination program to provide high quality service and care to [facility] fee-for-service beneficiaries. Torey Boyle was designed to help you enhance your health and well-being through the following services: ? Transitions of Care  support for individuals who are transitioning from one care setting to another (example: Hospital to home). ? Chronic and Complex Care Coordination  support for individuals and caregivers of those with serious or chronic illnesses or with more than one chronic (ongoing) condition and those who take a number of different medications. If you meet specific medical criteria, a [facility] [address] may call you directly to coordinate your care with your primary care physician and your other care providers. For questions about the [facility] programs, please, contact your physicians office. For general questions or additional information about Accountable Care Organizations: 
Please visit www.medicare.gov/acos. html or call 1-800-MEDICARE (0-621.760.6887) TTY users should call 1-426.602.3629. [employer] Announcement We are excited to announce that we are making your provider's discharge notes available to you in [employer]. You will see these notes when they are completed and signed by the physician that discharged you from your recent hospital stay. If you have any questions or concerns about any information you see in [employer], please call the Health Information Department where you were seen or reach out to your Primary Care Provider for more information about your plan of care. Introducing [facility]! Dear Patrick Pena: Thank you for requesting a [employer] account. Our records indicate that you already have an active [employer] account.   You can access your account anytime at [email or web address] Did you know that you can access your hospital and ER discharge instructions at any time in [employer]? You can also review all of your test results from your hospital stay or ER visit. Additional Information If you have questions, please visit the Frequently Asked Questions section of the [employer] website at [email or web address] Remember, [employer] is NOT to be used for urgent needs. For medical emergencies, dial 911. Now available from your iPhone and Android! Unresulted Labs-Please follow up with your PCP about these lab tests Order Current Status CULTURE, BLOOD, PAIRED Preliminary result Providers Seen During Your Hospitalization Provider Specialty Primary office phone Ramírez Guevara MD Emergency Medicine [phone] Rich Givens MD Family Practice [phone] Owen Dunbar MD Internal Medicine [phone] Your Primary Care Physician (PCP) Primary Care Physician Office Phone Office Fax Fransisco Flores [phone] You are allergic to the following No active allergies Recent Documentation Weight BMI OB Status Smoking Status 97.3 kg 34.62 kg/m2 Hysterectomy Former Smoker Emergency Contacts Name Discharge Info Relation Home Work Mobile Nimisha Arnold DISCHARGE CAREGIVER [3] Child [2] [phone] LovelyCandice rodriguez DISCHARGE CAREGIVER [3] Child [2] [phone] Patient Belongings The following personal items are in your possession at time of discharge: 
  Dental Appliances: None  Visual Aid: Glasses, With patient      Home Medications: None   Jewelry: None  Clothing: At bedside, Socks, Pants, Shirt, Undergarments    Other Valuables: None Please provide this summary of care documentation to your next provider. Signatures-by signing, you are acknowledging that this After Visit Summary has been reviewed with you and you have received a copy. Patient Signature:  ____________________________________________________________ Date:  ____________________________________________________________  
  
Serge Officer Provider Signature:  ____________________________________________________________ Date:  ____________________________________________________________
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
## 2018-01-30 ENCOUNTER — APPOINTMENT (OUTPATIENT)
Dept: MRI IMAGING | Age: 78
DRG: 065 | End: 2018-01-30
Attending: FAMILY MEDICINE
Payer: MEDICARE

## 2018-01-30 ENCOUNTER — APPOINTMENT (OUTPATIENT)
Dept: MRI IMAGING | Age: 78
DRG: 065 | End: 2018-01-30
Attending: PSYCHIATRY & NEUROLOGY
Payer: MEDICARE

## 2018-01-30 PROBLEM — I63.9 ACUTE ISCHEMIC STROKE (HCC): Status: ACTIVE | Noted: 2018-01-30

## 2018-01-30 PROBLEM — G81.94 LEFT HEMIPARESIS (HCC): Status: ACTIVE | Noted: 2018-01-30

## 2018-01-30 LAB
ABO + RH BLD: NORMAL
ATRIAL RATE: 62 BPM
BASOPHILS # BLD: 0.1 K/UL (ref 0–0.1)
BASOPHILS NFR BLD: 0 % (ref 0–1)
BLOOD GROUP ANTIBODIES SERPL: NORMAL
CALCULATED P AXIS, ECG09: 14 DEGREES
CALCULATED R AXIS, ECG10: -39 DEGREES
CALCULATED T AXIS, ECG11: 56 DEGREES
CHOLEST SERPL-MCNC: 181 MG/DL
DIAGNOSIS, 93000: NORMAL
DIFFERENTIAL METHOD BLD: ABNORMAL
EOSINOPHIL # BLD: 0.1 K/UL (ref 0–0.4)
EOSINOPHIL NFR BLD: 0 % (ref 0–7)
ERYTHROCYTE [DISTWIDTH] IN BLOOD BY AUTOMATED COUNT: 13.2 % (ref 11.5–14.5)
EST. AVERAGE GLUCOSE BLD GHB EST-MCNC: 174 MG/DL
GLUCOSE BLD STRIP.AUTO-MCNC: 133 MG/DL (ref 65–100)
GLUCOSE BLD STRIP.AUTO-MCNC: 135 MG/DL (ref 65–100)
GLUCOSE BLD STRIP.AUTO-MCNC: 138 MG/DL (ref 65–100)
GLUCOSE BLD STRIP.AUTO-MCNC: 179 MG/DL (ref 65–100)
HBA1C MFR BLD: 7.7 % (ref 4.2–6.3)
HCT VFR BLD AUTO: 36.5 % (ref 35–47)
HDLC SERPL-MCNC: 72 MG/DL
HDLC SERPL: 2.5 {RATIO} (ref 0–5)
HGB BLD-MCNC: 12 G/DL (ref 11.5–16)
IMM GRANULOCYTES # BLD: 0.1 K/UL (ref 0–0.04)
IMM GRANULOCYTES NFR BLD AUTO: 1 % (ref 0–0.5)
LDLC SERPL CALC-MCNC: 94.6 MG/DL (ref 0–100)
LIPID PROFILE,FLP: NORMAL
LYMPHOCYTES # BLD: 3.1 K/UL (ref 0.8–3.5)
LYMPHOCYTES NFR BLD: 18 % (ref 12–49)
MCH RBC QN AUTO: 31.3 PG (ref 26–34)
MCHC RBC AUTO-ENTMCNC: 32.9 G/DL (ref 30–36.5)
MCV RBC AUTO: 95.1 FL (ref 80–99)
MONOCYTES # BLD: 1.5 K/UL (ref 0–1)
MONOCYTES NFR BLD: 9 % (ref 5–13)
NEUTS SEG # BLD: 12.1 K/UL (ref 1.8–8)
NEUTS SEG NFR BLD: 71 % (ref 32–75)
NRBC # BLD: 0 K/UL (ref 0–0.01)
NRBC BLD-RTO: 0 PER 100 WBC
P-R INTERVAL, ECG05: 122 MS
PLATELET # BLD AUTO: 271 K/UL (ref 150–400)
PMV BLD AUTO: 9.5 FL (ref 8.9–12.9)
Q-T INTERVAL, ECG07: 482 MS
QRS DURATION, ECG06: 98 MS
QTC CALCULATION (BEZET), ECG08: 489 MS
RBC # BLD AUTO: 3.84 M/UL (ref 3.8–5.2)
SERVICE CMNT-IMP: ABNORMAL
SPECIMEN EXP DATE BLD: NORMAL
TRIGL SERPL-MCNC: 72 MG/DL (ref ?–150)
VENTRICULAR RATE, ECG03: 62 BPM
VLDLC SERPL CALC-MCNC: 14.4 MG/DL
WBC # BLD AUTO: 16.9 K/UL (ref 3.6–11)

## 2018-01-30 PROCEDURE — 83036 HEMOGLOBIN GLYCOSYLATED A1C: CPT | Performed by: FAMILY MEDICINE

## 2018-01-30 PROCEDURE — 82962 GLUCOSE BLOOD TEST: CPT

## 2018-01-30 PROCEDURE — 97116 GAIT TRAINING THERAPY: CPT

## 2018-01-30 PROCEDURE — 97530 THERAPEUTIC ACTIVITIES: CPT

## 2018-01-30 PROCEDURE — 97161 PT EVAL LOW COMPLEX 20 MIN: CPT

## 2018-01-30 PROCEDURE — 85025 COMPLETE CBC W/AUTO DIFF WBC: CPT | Performed by: FAMILY MEDICINE

## 2018-01-30 PROCEDURE — 74011250636 HC RX REV CODE- 250/636: Performed by: FAMILY MEDICINE

## 2018-01-30 PROCEDURE — 92610 EVALUATE SWALLOWING FUNCTION: CPT

## 2018-01-30 PROCEDURE — 93880 EXTRACRANIAL BILAT STUDY: CPT

## 2018-01-30 PROCEDURE — G8988 SELF CARE GOAL STATUS: HCPCS

## 2018-01-30 PROCEDURE — 93306 TTE W/DOPPLER COMPLETE: CPT

## 2018-01-30 PROCEDURE — 74011250637 HC RX REV CODE- 250/637: Performed by: HOSPITALIST

## 2018-01-30 PROCEDURE — 97535 SELF CARE MNGMENT TRAINING: CPT

## 2018-01-30 PROCEDURE — 65660000000 HC RM CCU STEPDOWN

## 2018-01-30 PROCEDURE — G8987 SELF CARE CURRENT STATUS: HCPCS

## 2018-01-30 PROCEDURE — 97165 OT EVAL LOW COMPLEX 30 MIN: CPT

## 2018-01-30 PROCEDURE — 74011000258 HC RX REV CODE- 258: Performed by: FAMILY MEDICINE

## 2018-01-30 PROCEDURE — 74011250637 HC RX REV CODE- 250/637: Performed by: FAMILY MEDICINE

## 2018-01-30 PROCEDURE — 36415 COLL VENOUS BLD VENIPUNCTURE: CPT | Performed by: FAMILY MEDICINE

## 2018-01-30 PROCEDURE — 80061 LIPID PANEL: CPT | Performed by: FAMILY MEDICINE

## 2018-01-30 RX ORDER — CLOPIDOGREL BISULFATE 75 MG/1
75 TABLET ORAL DAILY
Status: DISCONTINUED | OUTPATIENT
Start: 2018-01-31 | End: 2018-02-01 | Stop reason: HOSPADM

## 2018-01-30 RX ORDER — INSULIN LISPRO 100 [IU]/ML
INJECTION, SOLUTION INTRAVENOUS; SUBCUTANEOUS
Status: DISCONTINUED | OUTPATIENT
Start: 2018-01-30 | End: 2018-02-01 | Stop reason: HOSPADM

## 2018-01-30 RX ORDER — ROSUVASTATIN CALCIUM 10 MG/1
10 TABLET, COATED ORAL
Status: DISCONTINUED | OUTPATIENT
Start: 2018-01-30 | End: 2018-02-01 | Stop reason: HOSPADM

## 2018-01-30 RX ORDER — MAGNESIUM SULFATE 100 %
4 CRYSTALS MISCELLANEOUS AS NEEDED
Status: DISCONTINUED | OUTPATIENT
Start: 2018-01-30 | End: 2018-02-01 | Stop reason: HOSPADM

## 2018-01-30 RX ORDER — DEXTROSE 50 % IN WATER (D50W) INTRAVENOUS SYRINGE
12.5-25 AS NEEDED
Status: DISCONTINUED | OUTPATIENT
Start: 2018-01-30 | End: 2018-02-01 | Stop reason: HOSPADM

## 2018-01-30 RX ORDER — ASPIRIN 81 MG/1
81 TABLET ORAL DAILY
Status: DISCONTINUED | OUTPATIENT
Start: 2018-01-31 | End: 2018-01-30

## 2018-01-30 RX ORDER — PREDNISONE 1 MG/1
3 TABLET ORAL
Status: DISCONTINUED | OUTPATIENT
Start: 2018-01-31 | End: 2018-02-01 | Stop reason: HOSPADM

## 2018-01-30 RX ORDER — FLUOXETINE HYDROCHLORIDE 20 MG/1
60 CAPSULE ORAL DAILY
Status: DISCONTINUED | OUTPATIENT
Start: 2018-01-31 | End: 2018-02-01 | Stop reason: HOSPADM

## 2018-01-30 RX ORDER — DONEPEZIL HYDROCHLORIDE 10 MG/1
10 TABLET, FILM COATED ORAL
Status: DISCONTINUED | OUTPATIENT
Start: 2018-01-30 | End: 2018-02-01 | Stop reason: HOSPADM

## 2018-01-30 RX ADMIN — Medication 10 ML: at 21:42

## 2018-01-30 RX ADMIN — SODIUM CHLORIDE 125 ML/HR: 900 INJECTION, SOLUTION INTRAVENOUS at 00:11

## 2018-01-30 RX ADMIN — Medication 10 ML: at 14:00

## 2018-01-30 RX ADMIN — Medication 10 ML: at 06:42

## 2018-01-30 RX ADMIN — ACETAMINOPHEN 650 MG: 325 TABLET, FILM COATED ORAL at 00:05

## 2018-01-30 RX ADMIN — ROSUVASTATIN CALCIUM 10 MG: 10 TABLET, FILM COATED ORAL at 21:41

## 2018-01-30 RX ADMIN — DONEPEZIL HYDROCHLORIDE 10 MG: 10 TABLET, FILM COATED ORAL at 21:41

## 2018-01-30 RX ADMIN — Medication 10 ML: at 01:08

## 2018-01-30 RX ADMIN — PIPERACILLIN SODIUM,TAZOBACTAM SODIUM 3.38 G: 3; .375 INJECTION, POWDER, FOR SOLUTION INTRAVENOUS at 00:23

## 2018-01-30 RX ADMIN — PIPERACILLIN AND TAZOBACTAM 10.12 G: 3; .375 INJECTION, POWDER, FOR SOLUTION INTRAVENOUS at 01:08
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
## 2018-01-30 NOTE — ED TRIAGE NOTES
TRIAGE NOTE:  Patient arrives by EMS from [facility] with c/o \"sometime today\" staff noted patient was dragging left leg. Patient denies current pain, reports intermittent pain to left lower leg. Evelyn Tran
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
## 2018-01-30 NOTE — PROGRESS NOTES
Problem: Falls - Risk of  Goal: *Absence of Falls  Document David Fall Risk and appropriate interventions in the flowsheet.    Outcome: Progressing Towards Goal  Fall Risk Interventions:  Mobility Interventions: Communicate number of staff needed for ambulation/transfer, OT consult for ADLs, Patient to call before getting OOB, PT Consult for mobility concerns, PT Consult for assist device competence, Bed/chair exit alarm    Mentation Interventions: Door open when patient unattended, Increase mobility, More frequent rounding, Reorient patient, Toileting rounds, Bed/chair exit alarm, Adequate sleep, hydration, pain control         Elimination Interventions: Bed/chair exit alarm, Patient to call for help with toileting needs, Toilet paper/wipes in reach, Toileting schedule/hourly rounds, Elevated toilet seat, Call light in reach

## 2018-01-30 NOTE — PROCEDURES
[facility]  *** FINAL REPORT ***    Name: Niesha Yang  MRN: [id number]    Inpatient  : 30 Mar 1940  HIS Order #: [id number]  [id number] [facility] Visit #: [id number]  Date: 2018    TYPE OF TEST: Cerebrovascular Duplex    REASON FOR TEST  Transient ischemic attacks    Right Carotid:-             Proximal               Mid                 Distal  cm/s  Systolic  Diastolic  Systolic  Diastolic  Systolic  Diastolic  CCA:     47.9       0.0                            98.0       9.0  Bulb:  ECA:     82.0  ICA:    109.0       9.0                            76.0      11.0  ICA/CCA:  1.1       1.0    ICA Stenosis:    Right Vertebral:-  Finding: Antegrade  Sys:       65.0  Yolanda:    Right Subclavian:    Left Carotid:-            Proximal                Mid                 Distal  cm/s  Systolic  Diastolic  Systolic  Diastolic  Systolic  Diastolic  CCA:    556.6      12.0                           104.0      14.0  Bulb:  ECA:    138.0  ICA:    120.0      16.0                            69.0      12.0  ICA/CCA:  1.2       1.1    ICA Stenosis:    Left Vertebral:-  Finding: Antegrade  Sys:       37.0  Yolanda:    Left Subclavian:    INTERPRETATION/FINDINGS  PROCEDURE:  Color duplex ultrasound imaging of extracranial  cerebrovascular arteries. FINDINGS:       Right:  Internal carotid velocity is within normal limits. There   is narrowing of the internal carotid flow channel on color Doppler  imaging and calcific plaque on B-mode imaging, consistent with less  than 50 percent stenosis. The common and external carotid arteries  are patent and without evidence of hemodynamically significant  stenosis. Mild common carotid wall thickening seen. Left:   Internal carotid velocity is within normal limits. There   is narrowing of the internal carotid flow channel on color Doppler  imaging and calcific plaque on B-mode imaging, consistent with less  than 50 percent stenosis.   The common and external carotid arteries  are patent and without evidence of hemodynamically significant  stenosis. IMPRESSION:  Consistent with less than 50% stenosis of the internal  carotids. Vertebrals are patent with antegrade flow. ADDITIONAL COMMENTS    I have personally reviewed the data relevant to the interpretation of  this  study.     TECHNOLOGIST: Dari Abraham RVT  Signed: 01/30/2018 04:31 PM    PHYSICIAN: Liv Cerna MD  Signed: 01/31/2018 08:41 AM
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
## 2018-01-30 NOTE — INTERDISCIPLINARY ROUNDS
IDR/SLIDR Summary          Patient: Gigi Roman MRN: [id number]    Age: 76 y.o. YOB: 1940 Room/Bed: [facility]   Admit Diagnosis: Leukocytosis  Left leg weakness  Dehydration  Principal Diagnosis: <principal problem not specified>   Goals: safety  Readmission: NO  Quality Measure: Not applicable  VTE Prophylaxis: Mechanical  Influenza Vaccine screening completed? YES  Pneumococcal Vaccine screening completed? NO  Mobility needs: Yes   Nutrition plan:Yes  Consults: P. T and O.T. Financial concerns:No  Escalated to CM? YES  RRAT Score: 20   Interventions:  Testing due for pt today? YES  LOS: 1 days Expected length of stay 2 days  Discharge plan: TBD   PCP: Elliot Nguyễn MD  Transportation needs: Yes    Days before discharge:two or more days before discharge   Discharge disposition: SNF? Signed:      Shar Arreaga RN  1/30/2018  5:04 AM
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
## 2018-01-30 NOTE — ROUTINE PROCESS
TRANSFER - OUT REPORT:    Verbal report given to Mora Polo RN(name) on [employer]  being transferred to [facility]U(unit) for routine progression of care       Report consisted of patients Situation, Background, Assessment and   Recommendations(SBAR). Information from the following report(s) SBAR, Kardex, ED Summary and MAR was reviewed with the receiving nurse. Lines:   Peripheral IV 01/29/18 Right Forearm (Active)   Site Assessment Clean, dry, & intact 1/29/2018  8:43 PM   Phlebitis Assessment 0 1/29/2018  8:43 PM   Infiltration Assessment 0 1/29/2018  8:43 PM   Dressing Status Clean, dry, & intact 1/29/2018  8:43 PM       Peripheral IV 01/29/18 Left Antecubital (Active)   Site Assessment Clean, dry, & intact 1/29/2018 10:51 PM   Phlebitis Assessment 0 1/29/2018 10:51 PM   Infiltration Assessment 0 1/29/2018 10:51 PM   Dressing Status Clean, dry, & intact 1/29/2018 10:51 PM        Opportunity for questions and clarification was provided.       Patient transported with:   [employer]
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
## 2018-01-30 NOTE — PROGRESS NOTES
CM reviewed chart and spoke with pt's daughter, Yun Son ([phone]) to introduce her to the role of CM and transition of care. She verbalized understanding. This pt lives in the [address] [place] [address] [place] [facility] [place] [address]. She would like this pt to return to that setting at d/c. Per therapy notes, pt will be able to return to this setting at d/c. Cm attempted to call [place] in the [place] [address] ([phone]), but was not successful. Will continue to follow.  Marga Hedrick

## 2018-01-31 LAB
ALBUMIN SERPL-MCNC: 2.3 G/DL (ref 3.5–5)
ALBUMIN/GLOB SERPL: 0.6 {RATIO} (ref 1.1–2.2)
ALP SERPL-CCNC: 73 U/L (ref 45–117)
ALT SERPL-CCNC: 15 U/L (ref 12–78)
ANION GAP SERPL CALC-SCNC: 7 MMOL/L (ref 5–15)
AST SERPL-CCNC: 11 U/L (ref 15–37)
BACTERIA SPEC CULT: NORMAL
BILIRUB SERPL-MCNC: 0.5 MG/DL (ref 0.2–1)
BUN SERPL-MCNC: 13 MG/DL (ref 6–20)
BUN/CREAT SERPL: 17 (ref 12–20)
CALCIUM SERPL-MCNC: 8.3 MG/DL (ref 8.5–10.1)
CHLORIDE SERPL-SCNC: 107 MMOL/L (ref 97–108)
CO2 SERPL-SCNC: 25 MMOL/L (ref 21–32)
CREAT SERPL-MCNC: 0.78 MG/DL (ref 0.55–1.02)
ERYTHROCYTE [DISTWIDTH] IN BLOOD BY AUTOMATED COUNT: 13.2 % (ref 11.5–14.5)
GLOBULIN SER CALC-MCNC: 3.6 G/DL (ref 2–4)
GLUCOSE BLD STRIP.AUTO-MCNC: 118 MG/DL (ref 65–100)
GLUCOSE BLD STRIP.AUTO-MCNC: 120 MG/DL (ref 65–100)
GLUCOSE BLD STRIP.AUTO-MCNC: 238 MG/DL (ref 65–100)
GLUCOSE SERPL-MCNC: 128 MG/DL (ref 65–100)
HCT VFR BLD AUTO: 34.4 % (ref 35–47)
HGB BLD-MCNC: 11.3 G/DL (ref 11.5–16)
MCH RBC QN AUTO: 31.7 PG (ref 26–34)
MCHC RBC AUTO-ENTMCNC: 32.8 G/DL (ref 30–36.5)
MCV RBC AUTO: 96.6 FL (ref 80–99)
NRBC # BLD: 0 K/UL (ref 0–0.01)
NRBC BLD-RTO: 0 PER 100 WBC
PLATELET # BLD AUTO: 261 K/UL (ref 150–400)
PMV BLD AUTO: 9.7 FL (ref 8.9–12.9)
POTASSIUM SERPL-SCNC: 3.9 MMOL/L (ref 3.5–5.1)
PROT SERPL-MCNC: 5.9 G/DL (ref 6.4–8.2)
RBC # BLD AUTO: 3.56 M/UL (ref 3.8–5.2)
SERVICE CMNT-IMP: ABNORMAL
SERVICE CMNT-IMP: NORMAL
SODIUM SERPL-SCNC: 139 MMOL/L (ref 136–145)
WBC # BLD AUTO: 12.2 K/UL (ref 3.6–11)

## 2018-01-31 PROCEDURE — 65660000000 HC RM CCU STEPDOWN

## 2018-01-31 PROCEDURE — 85027 COMPLETE CBC AUTOMATED: CPT | Performed by: HOSPITALIST

## 2018-01-31 PROCEDURE — 80053 COMPREHEN METABOLIC PANEL: CPT | Performed by: HOSPITALIST

## 2018-01-31 PROCEDURE — 74011636637 HC RX REV CODE- 636/637: Performed by: HOSPITALIST

## 2018-01-31 PROCEDURE — 74011636637 HC RX REV CODE- 636/637: Performed by: FAMILY MEDICINE

## 2018-01-31 PROCEDURE — 82962 GLUCOSE BLOOD TEST: CPT

## 2018-01-31 PROCEDURE — 84443 ASSAY THYROID STIM HORMONE: CPT | Performed by: HOSPITALIST

## 2018-01-31 PROCEDURE — 74011250637 HC RX REV CODE- 250/637: Performed by: HOSPITALIST

## 2018-01-31 PROCEDURE — 36415 COLL VENOUS BLD VENIPUNCTURE: CPT | Performed by: HOSPITALIST

## 2018-01-31 RX ADMIN — Medication 10 ML: at 06:39

## 2018-01-31 RX ADMIN — Medication 10 ML: at 16:11

## 2018-01-31 RX ADMIN — PREDNISONE 3 MG: 1 TABLET ORAL at 09:54

## 2018-01-31 RX ADMIN — FLUOXETINE 60 MG: 20 CAPSULE ORAL at 09:54

## 2018-01-31 RX ADMIN — INSULIN LISPRO 4 UNITS: 100 INJECTION, SOLUTION INTRAVENOUS; SUBCUTANEOUS at 12:47

## 2018-01-31 RX ADMIN — CLOPIDOGREL BISULFATE 75 MG: 75 TABLET ORAL at 09:54

## 2018-01-31 NOTE — PROGRESS NOTES
Hospitalist Progress Note                               [address] MD Margarita                                     Answering service: [phone]                               OR [phone] from in house phone                               Cell: [phone]              Date of Service:  2018  NAME:  Karen Abraham  :  1940  MRN:  [id number]      Admission Summary:   Florencio Martinez was brought for acute onset left leg weakness as noted by daughter. Patient with dementia and not good source of history. Interval history / Subjective:     Patient with dementia and does not provide good history. Daughter was in the room. Daughter said she noted her mom was unable to move yesterday and she was dragging her left leg. Assessment & Plan:   Left hemiparesis likely due to acute ischemic stroke  -Head CT negative for acute events;TTE unremarkable. EKG ,no evidence of afib/flutter. Carotid doppler,no significant occlusion. -MRI brain not done. After asked what could be done differently after the MRI results. I informed her of the neurologist's suggestion,she wanted her mom not to proceed with the MRI studies  -Switch asa to plavix and started low dose statins as per neurologist recommendations.     Dehydration. Continue IV fluids. Repeat renal panel in a.m.     Leukocytosis: no evidence of infection. Daughter said she has had elevated WBC in the past when she had acute illness  -D/c abx. Check CBC in am.     Type 2 diabetes mellitus:diet controlled. A1C 7.7     Alzheimer's dementia.: ON ARICEPT. Sheis from [facility]     Hypertension. fairly controlled,no need for antihypertensives.                 Diet:regular  Code status: DNR  DVT prophylaxis: scd  Care Plan discussed with: patient,daughter    Discharge planning/disposition:return to admission address.     Hospital Problems  Date Reviewed: 10/9/2017          Codes Class Noted POA    Dehydration ICD-10-CM: E86.0  ICD-9-CM: 276.51  1/29/2018 Unknown        Leukocytosis ICD-10-CM: D72.829  ICD-9-CM: 288.60  1/29/2018 Unknown        * (Principal)Left leg weakness ICD-10-CM: R29.898  ICD-9-CM: 729.89  1/29/2018 Unknown                Review of Systems:   A comprehensive review of systems was negative except for that written in the HPI. Physical Examination:      Last 24hrs VS reviewed since prior progress note. Most recent are:  Visit Vitals    /41 (BP 1 Location: Right arm, BP Patient Position: At rest)    Pulse 60    Temp 98 °F (36.7 °C)    Resp 14    Wt 99.8 kg (220 lb 0.3 oz)    SpO2 97%    BMI 35.51 kg/m2           Constitutional:  No acute distress, cooperative, demented   Eyes: Non icteric,non pallor,no erythema,discharge,PERRLA   ENT:  Oral mucous moist, oropharynx benign. Neck supple,    Resp:  CTA bilaterally. No wheezing/rhonchi/rales. No accessory muscle use   CV:  Regular rhythm, normal rate, no murmurs, gallops, rubs    GI:  Soft, non distended, non tender.  normoactive bowel sounds, no hepatosplenomegaly    :  No CVA or suprapubic tenderness   Skin  :  No erythema,rash,bullae,dipigmentation     Musculoskeletal:  No edema, warm, 2+ pulses throughout    Neurologic:  Alert and oriented,left sided weakness with power 4-/5         No intake or output data in the 24 hours ending 01/30/18 2211       Data Review:    Review and/or order of clinical lab test  Review and/or order of tests in the radiology section of CPT  Review and/or order of tests in the medicine section of CPT      Labs:     Recent Labs      01/30/18   0335  01/29/18 2056   WBC  16.9*  19.5*   HGB  12.0  13.5   HCT  36.5  40.4   PLT  271  286     Recent Labs      01/29/18 2056   NA  136   K  4.4   CL  100   CO2  29   BUN  22*   CREA  1.03*   GLU  194*   CA  8.9     Recent Labs      01/29/18 2056   SGOT  13*   ALT  19   AP  88   TBILI  0.6   TP  7.0   ALB  2.9*   GLOB  4.1*     Recent Labs      01/29/18 2056   INR  1.1 PTP  10.9   APTT  29.2      No results for input(s): FE, TIBC, PSAT, FERR in the last 72 hours. No results found for: FOL, RBCF   No results for input(s): PH, PCO2, PO2 in the last 72 hours.   Recent Labs      01/29/18 2056   TROIQ  <0.04     Lab Results   Component Value Date/Time    Cholesterol, total 181 01/30/2018 03:35 AM    HDL Cholesterol 72 01/30/2018 03:35 AM    LDL, calculated 94.6 01/30/2018 03:35 AM    Triglyceride 72 01/30/2018 03:35 AM    CHOL/HDL Ratio 2.5 01/30/2018 03:35 AM     Lab Results   Component Value Date/Time    Glucose (POC) 135 01/30/2018 04:47 PM    Glucose (POC) 179 01/30/2018 11:37 AM    Glucose (POC) 138 01/30/2018 09:37 AM    Glucose (POC) 116 07/30/2016 08:31 AM    Glucose (POC) 147 10/20/2015 06:46 AM     Lab Results   Component Value Date/Time    Color DARK YELLOW 01/29/2018 08:56 PM    Appearance CLEAR 01/29/2018 08:56 PM    Specific gravity 1.025 01/29/2018 08:56 PM    pH (UA) 6.0 01/29/2018 08:56 PM    Protein TRACE 01/29/2018 08:56 PM    Glucose NEGATIVE  01/29/2018 08:56 PM    Ketone NEGATIVE  01/29/2018 08:56 PM    Bilirubin NEGATIVE  01/29/2018 08:56 PM    Urobilinogen 0.2 01/29/2018 08:56 PM    Nitrites NEGATIVE  01/29/2018 08:56 PM    Leukocyte Esterase NEGATIVE  01/29/2018 08:56 PM    Epithelial cells MODERATE 01/29/2018 08:56 PM    Bacteria NEGATIVE  01/29/2018 08:56 PM    WBC 0-4 01/29/2018 08:56 PM    RBC 0-5 01/29/2018 08:56 PM         Medications Reviewed:     Current Facility-Administered Medications   Medication Dose Route Frequency    glucose chewable tablet 16 g  4 Tab Oral PRN    dextrose (D50W) injection syrg 12.5-25 g  12.5-25 g IntraVENous PRN    glucagon (GLUCAGEN) injection 1 mg  1 mg IntraMUSCular PRN    insulin lispro (HUMALOG) injection   SubCUTAneous AC&HS    [START ON 1/31/2018] aspirin delayed-release tablet 81 mg  81 mg Oral DAILY    donepezil (ARICEPT) tablet 10 mg  10 mg Oral QHS    [START ON 1/31/2018] FLUoxetine (PROzac) capsule 60 mg 60 mg Oral DAILY    [START ON 1/31/2018] predniSONE (DELTASONE) tablet 3 mg  3 mg Oral DAILY WITH BREAKFAST    rosuvastatin (CRESTOR) tablet 10 mg  10 mg Oral QHS    sodium chloride (NS) flush 5-10 mL  5-10 mL IntraVENous PRN    0.9% sodium chloride infusion  75 mL/hr IntraVENous CONTINUOUS    sodium chloride (NS) flush 5-10 mL  5-10 mL IntraVENous Q8H    sodium chloride (NS) flush 5-10 mL  5-10 mL IntraVENous PRN    acetaminophen (TYLENOL) tablet 650 mg  650 mg Oral Q4H PRN    Or    acetaminophen (TYLENOL) solution 650 mg  650 mg Per NG tube Q4H PRN    Or    acetaminophen (TYLENOL) suppository 650 mg  650 mg Rectal Q4H PRN     ______________________________________________________________________  EXPECTED LENGTH OF STAY: 2d 16h  ACTUAL LENGTH OF STAY:          1                 Taylor Ma MD

## 2018-01-31 NOTE — PROGRESS NOTES
CM anticipating discharge today. Called pt's daughter, Raj Ernandez ([phone]) regarding plans and transportation, requested call back. Also called memory care unit again, [facility] in the [address] end to advise of return today, phone [phone]. Transferred to [facility] to confirm facility's ability to receive pt today. No barriers noted by therapy for pt to return to memory care. Caroline Alcala, MSW    9:30am  Spoke with daughter at the bedside, and she would like to use wheelchair Renaye Nakul to transport pt to [facility] memory care. Daughter can be reached to pay for trip via credit card. Advised daughter CM would set this up. Caroline Alcala, MSW      11am  Spoke with MD this morning and pt has a pending test result. [employer] can be scheduled if needed for wheelchair Renaye Nakul, phone [phone].      ANIBAL Ortega

## 2018-01-31 NOTE — PROGRESS NOTES
Spiritual Care Partner Volunteer visited patient in Rm 676 on  1/31/2018.   Documented by:  Chaplain Flores MDiv, MS, [address] [place] [place]  [facility] PRA ([id number])

## 2018-01-31 NOTE — PROGRESS NOTES
Bedside and Verbal shift change report given to Olena Torres (oncoming nurse) by Tamara Zheng (offgoing nurse). Report included the following information SBAR, Kardex, Intake/Output, MAR and Recent Results.
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
## 2018-01-31 NOTE — PROGRESS NOTES
Problem: Falls - Risk of  Goal: *Absence of Falls  Document David Fall Risk and appropriate interventions in the flowsheet.    Outcome: Progressing Towards Goal  Fall Risk Interventions:  Mobility Interventions: Bed/chair exit alarm, Patient to call before getting OOB, PT Consult for mobility concerns, PT Consult for assist device competence, Utilize walker, cane, or other assitive device    Mentation Interventions: Bed/chair exit alarm, Door open when patient unattended, Family/sitter at bedside, More frequent rounding, Update white board         Elimination Interventions: Call light in reach, Toileting schedule/hourly rounds, Bed/chair exit alarm

## 2018-01-31 NOTE — INTERDISCIPLINARY ROUNDS
IDR/SLIDR Summary          Patient: Joycie Spatz MRN: [id number]    Age: 76 y.o. YOB: 1940 Room/Bed: [facility]   Admit Diagnosis: Leukocytosis  Left leg weakness  Dehydration  Principal Diagnosis: Left hemiparesis (HCC)   Goals: safety; d/c planning  Readmission: NO  Quality Measure: Not applicable  VTE Prophylaxis: Mechanical  Influenza Vaccine screening completed? YES  Pneumococcal Vaccine screening completed? NO  Mobility needs: Yes   Nutrition plan:No  Consults:P.T, O.T., Speech and Case Management    Financial concerns:No  Escalated to CM? NO  RRAT Score: 20   Interventions:H2H  Testing due for pt today?  NO  LOS: 2 days Expected length of stay 2 days  Discharge plan: [facility] to [facility]   PCP: Kimberly Salgado MD  Transportation needs: Yes    Days before discharge:one day until discharge   Discharge disposition: TBD    Signed:     Leelee Mcmullen RN  1/31/2018  4:43 AM

## 2018-01-31 NOTE — PROGRESS NOTES
CM called and left a voicemail message requesting a call back from [facility] in order to discuss the patient's possible discharge plan.  CM will continue to follow ANIBAL Mckay

## 2018-01-31 NOTE — PROGRESS NOTES
Problem: Falls - Risk of  Goal: *Absence of Falls  Document David Fall Risk and appropriate interventions in the flowsheet.    Outcome: Progressing Towards Goal  Fall Risk Interventions:  Mobility Interventions: (P) Assess mobility with egress test, Bed/chair exit alarm, Communicate number of staff needed for ambulation/transfer, OT consult for ADLs, Patient to call before getting OOB, PT Consult for mobility concerns, PT Consult for assist device competence, Strengthening exercises (ROM-active/passive), Utilize walker, cane, or other assitive device, Utilize gait belt for transfers/ambulation    Mentation Interventions: (P) Adequate sleep, hydration, pain control, Bed/chair exit alarm, Door open when patient unattended, Eyeglasses and hearing aids, Family/sitter at bedside, Increase mobility, More frequent rounding, Reorient patient, Room close to nurse's station, Self-releasing belt, Toileting rounds, Update white board, Gait belt with transfers/ambulation    Medication Interventions: (P) Assess postural VS orthostatic hypotension, Bed/chair exit alarm, Evaluate medications/consider consulting pharmacy, Patient to call before getting OOB, Teach patient to arise slowly, Utilize gait belt for transfers/ambulation    Elimination Interventions: (P) Bed/chair exit alarm, Call light in reach, Patient to call for help with toileting needs, Toilet paper/wipes in reach, Toileting schedule/hourly rounds

## 2018-02-01 VITALS
TEMPERATURE: 97.7 F | OXYGEN SATURATION: 96 % | SYSTOLIC BLOOD PRESSURE: 136 MMHG | HEART RATE: 54 BPM | BODY MASS INDEX: 34.62 KG/M2 | WEIGHT: 214.51 LBS | DIASTOLIC BLOOD PRESSURE: 44 MMHG | RESPIRATION RATE: 18 BRPM

## 2018-02-01 LAB
BASOPHILS # BLD: 0.1 K/UL (ref 0–0.1)
BASOPHILS NFR BLD: 0 % (ref 0–1)
C DIFF TOX GENS STL QL NAA+PROBE: NEGATIVE
DIFFERENTIAL METHOD BLD: ABNORMAL
EOSINOPHIL # BLD: 0.1 K/UL (ref 0–0.4)
EOSINOPHIL NFR BLD: 1 % (ref 0–7)
ERYTHROCYTE [DISTWIDTH] IN BLOOD BY AUTOMATED COUNT: 13.2 % (ref 11.5–14.5)
GLUCOSE BLD STRIP.AUTO-MCNC: 120 MG/DL (ref 65–100)
GLUCOSE BLD STRIP.AUTO-MCNC: 121 MG/DL (ref 65–100)
GLUCOSE BLD STRIP.AUTO-MCNC: 158 MG/DL (ref 65–100)
HCT VFR BLD AUTO: 35 % (ref 35–47)
HGB BLD-MCNC: 11.4 G/DL (ref 11.5–16)
IMM GRANULOCYTES # BLD: 0.1 K/UL (ref 0–0.04)
IMM GRANULOCYTES NFR BLD AUTO: 1 % (ref 0–0.5)
LYMPHOCYTES # BLD: 2.5 K/UL (ref 0.8–3.5)
LYMPHOCYTES NFR BLD: 22 % (ref 12–49)
MCH RBC QN AUTO: 31.2 PG (ref 26–34)
MCHC RBC AUTO-ENTMCNC: 32.6 G/DL (ref 30–36.5)
MCV RBC AUTO: 95.9 FL (ref 80–99)
MONOCYTES # BLD: 0.8 K/UL (ref 0–1)
MONOCYTES NFR BLD: 7 % (ref 5–13)
NEUTS SEG # BLD: 7.8 K/UL (ref 1.8–8)
NEUTS SEG NFR BLD: 69 % (ref 32–75)
NRBC # BLD: 0 K/UL (ref 0–0.01)
NRBC BLD-RTO: 0 PER 100 WBC
PLATELET # BLD AUTO: 293 K/UL (ref 150–400)
PMV BLD AUTO: 9.5 FL (ref 8.9–12.9)
RBC # BLD AUTO: 3.65 M/UL (ref 3.8–5.2)
SERVICE CMNT-IMP: ABNORMAL
TSH SERPL DL<=0.05 MIU/L-ACNC: 1.64 UIU/ML (ref 0.36–3.74)
WBC # BLD AUTO: 11.4 K/UL (ref 3.6–11)

## 2018-02-01 PROCEDURE — 85025 COMPLETE CBC W/AUTO DIFF WBC: CPT | Performed by: HOSPITALIST

## 2018-02-01 PROCEDURE — 82962 GLUCOSE BLOOD TEST: CPT

## 2018-02-01 PROCEDURE — 74011250637 HC RX REV CODE- 250/637: Performed by: FAMILY MEDICINE

## 2018-02-01 PROCEDURE — 87493 C DIFF AMPLIFIED PROBE: CPT | Performed by: HOSPITALIST

## 2018-02-01 PROCEDURE — 74011636637 HC RX REV CODE- 636/637: Performed by: HOSPITALIST

## 2018-02-01 PROCEDURE — 74011250637 HC RX REV CODE- 250/637: Performed by: HOSPITALIST

## 2018-02-01 PROCEDURE — 36415 COLL VENOUS BLD VENIPUNCTURE: CPT | Performed by: HOSPITALIST

## 2018-02-01 RX ORDER — ROSUVASTATIN CALCIUM 10 MG/1
10 TABLET, COATED ORAL
Qty: 30 TAB | Refills: 0 | Status: SHIPPED | OUTPATIENT
Start: 2018-02-01 | End: 2018-03-03

## 2018-02-01 RX ORDER — CLOPIDOGREL BISULFATE 75 MG/1
75 TABLET ORAL DAILY
Qty: 30 TAB | Refills: 0 | Status: SHIPPED | OUTPATIENT
Start: 2018-02-02 | End: 2018-03-04

## 2018-02-01 RX ADMIN — FLUOXETINE 60 MG: 20 CAPSULE ORAL at 10:01

## 2018-02-01 RX ADMIN — ROSUVASTATIN CALCIUM 10 MG: 10 TABLET, FILM COATED ORAL at 00:07

## 2018-02-01 RX ADMIN — Medication 10 ML: at 00:07

## 2018-02-01 RX ADMIN — PREDNISONE 3 MG: 1 TABLET ORAL at 10:01

## 2018-02-01 RX ADMIN — ACETAMINOPHEN 650 MG: 325 TABLET, FILM COATED ORAL at 03:25

## 2018-02-01 RX ADMIN — CLOPIDOGREL BISULFATE 75 MG: 75 TABLET ORAL at 10:02

## 2018-02-01 RX ADMIN — DONEPEZIL HYDROCHLORIDE 10 MG: 10 TABLET, FILM COATED ORAL at 00:07

## 2018-02-01 NOTE — INTERDISCIPLINARY ROUNDS
IDR/SLIDR Summary          Patient: Regan Reno MRN: [id number]    Age: 76 y.o. YOB: 1940 Room/Bed: [facility]   Admit Diagnosis: Leukocytosis  Left leg weakness  Dehydration  Principal Diagnosis: Left hemiparesis (HCC)   Goals: safety  Readmission: NO  Quality Measure: Not applicable  VTE Prophylaxis: Mechanical  Influenza Vaccine screening completed? YES  Pneumococcal Vaccine screening completed? NO  Mobility needs: No   Nutrition plan:Yes  Consults:P.T, O.T., Speech and Case Management    Financial concerns:No  Escalated to CM? YES  RRAT Score: 29   Interventions:  Testing due for pt today?  NO  LOS: 3 days Expected length of stay 3 days  Discharge plan: [facility]   PCP: Lorena Del Valle MD  Transportation needs: No    Days before discharge:one day until discharge   Discharge disposition: Nursing Home    Signed:     Jessica Greenfield  2/1/2018  8:16 AM

## 2018-02-01 NOTE — DISCHARGE INSTRUCTIONS
Discharge Instructions       PATIENT ID: Gigi Roman  MRN: [id number]   YOB: 1940    DATE OF ADMISSION: 1/29/2018  7:19 PM    DATE OF DISCHARGE: 2/1/2018    PRIMARY CARE PROVIDER: Elliot Nguyễn MD     ATTENDING PHYSICIAN: Mimi Nugent MD  DISCHARGING PROVIDER: Mimi Nugent MD    To contact this individual call [phone] and ask the  to page. If unavailable ask to be transferred the Adult Hospitalist Department. DISCHARGE DIAGNOSES and CARE RECOMMENDATIONS:   Left sided weakness due likely to ischemic stroke:aspirin switched to Plavix. Bradycardia,asymptomatic:no HR slowing medications except Aricept. Patient has no symptoms from the slow heart rate and Hr is >50. If slow HR causes problem or goes lower than 40-50,may consider stopping or decreasing dose of Aricept and have her evaluated by cardiologist.    Dehydration: she received intravenous fluid and dehydration is resolved. Please encourage to take enough fluids,she needs to be reminded frequently due top her dementia. Diarrhea:C diff negative. Diarrhea has slowed down. Take probiotics. CONSULTATIONS: IP CONSULT TO HOSPITALIST  IP CONSULT TO NEUROLOGY    PROCEDURES/SURGERIES: * No surgery found *    PENDING TEST RESULTS:   At the time of discharge the following test results are still pending: none    FOLLOW UP APPOINTMENTS:   Follow-up Information     Follow up With Details Comments [address] [facility] MD Ayesha   [facility] [place] [place] [address] [phone]                 DIET: Cardiac Diet    ACTIVITY: Activity as tolerated    WOUND CARE: NA    EQUIPMENT needed: NA      DISCHARGE MEDICATIONS:   See Medication Reconciliation Form    · It is important that you take the medication exactly as they are prescribed. · Keep your medication in the bottles provided by the pharmacist and keep a list of the medication names, dosages, and times to be taken in your wallet.    · Do not take other medications without consulting your doctor. NOTIFY YOUR PHYSICIAN FOR ANY OF THE FOLLOWING:   Fever over 101 degrees for 24 hours. Chest pain, shortness of breath, fever, chills, nausea, vomiting, diarrhea, change in mentation, falling, weakness, bleeding. Severe pain or pain not relieved by medications. Or, any other signs or symptoms that you may have questions about. DISPOSITION:  x  Home With:[facility]    OT  PT  [facility]  RN       SNF/Inpatient Rehab/LTAC    Independent/assisted living    Hospice    Other:           Signed:    Prince Tam MD  2/1/2018  2:58 PM
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
## 2018-02-01 NOTE — PROGRESS NOTES
Called report to  [facility] and spoke with Char Gutierrez ([phone]). They were told that the pt had been tested for C-diff and it came back negative. Understanding of the pt's condition was verbalized. Pt being transported via [address] [place] w/herminia Bajwa and with them is being sent her paper prescriptions, a copy of the pt\"s MAR, Kardex, Discharge instructions, and DNR.

## 2018-02-01 NOTE — PROGRESS NOTES
Bedside and Verbal shift change report given to Melanie Marquez (oncoming nurse) by Dorian Stephens (offgoing nurse). Report included the following information SBAR, Kardex, Intake/Output, MAR and Recent Results.

## 2018-02-01 NOTE — PROGRESS NOTES
Bedside and Verbal shift change report given to Hussain Harris RN (oncoming nurse) by Bernardo Jimenez RN (offgoing nurse). Report included the following information SBAR, Recent Results, MAR, Cardiac Sinus bubba.

## 2018-02-01 NOTE — PROGRESS NOTES
CM called and spoke with admissions and nursing staff at [facility]- the facility stated that they can accept the patient back today to the memory care unit. CM updated them regarding c-diff rule-out, and facility stated that is ongoing with the patient. The CM spoke with the patient's daughter, Louie Huang, over the phone and at bedside. Family is requesting wheelchair Thomason Huh for transportation upon discharge. CM called [employer] and transportation established for 5:00 p.m.- family verbalized understanding and in agreement with paying out-of-pocket costs. The family denied having any other questions and concerns currently, CM called and updated facility of estimated time of arrival. RN notified of transportation pick-up for 5:00 p.m. CM will continue to follow.  ANIBAL Reddy

## 2018-02-01 NOTE — PROGRESS NOTES
Problem: Falls - Risk of  Goal: *Absence of Falls  Document David Fall Risk and appropriate interventions in the flowsheet.    Outcome: Progressing Towards Goal  Fall Risk Interventions:  Mobility Interventions: Bed/chair exit alarm, Communicate number of staff needed for ambulation/transfer, Patient to call before getting OOB, PT Consult for mobility concerns, Utilize walker, cane, or other assitive device, PT Consult for assist device competence    Mentation Interventions: Adequate sleep, hydration, pain control, Bed/chair exit alarm, Door open when patient unattended, Eyeglasses and hearing aids, Increase mobility, More frequent rounding, Reorient patient, Room close to nurse's station, Toileting rounds, Update white board    Medication Interventions: Assess postural VS orthostatic hypotension, Bed/chair exit alarm, Patient to call before getting OOB, Teach patient to arise slowly    Elimination Interventions: Bed/chair exit alarm, Call light in reach, Patient to call for help with toileting needs, Toileting schedule/hourly rounds

## 2018-02-03 LAB
BACTERIA SPEC CULT: NORMAL
SERVICE CMNT-IMP: NORMAL

## 2018-04-14 ENCOUNTER — HOSPITAL ENCOUNTER (EMERGENCY)
Age: 78
Discharge: HOME | End: 2018-04-14
Attending: EMERGENCY MEDICINE
Payer: MEDICARE

## 2018-04-14 ENCOUNTER — APPOINTMENT (OUTPATIENT)
Dept: GENERAL RADIOLOGY | Age: 78
End: 2018-04-14
Attending: EMERGENCY MEDICINE
Payer: MEDICARE

## 2018-04-14 VITALS
TEMPERATURE: 98.6 F | DIASTOLIC BLOOD PRESSURE: 73 MMHG | SYSTOLIC BLOOD PRESSURE: 132 MMHG | BODY MASS INDEX: 32.14 KG/M2 | WEIGHT: 200 LBS | RESPIRATION RATE: 17 BRPM | OXYGEN SATURATION: 98 % | HEART RATE: 66 BPM | HEIGHT: 66 IN

## 2018-04-14 DIAGNOSIS — S29.8XXA BLUNT CHEST TRAUMA, INITIAL ENCOUNTER: Primary | ICD-10-CM

## 2018-04-14 DIAGNOSIS — S51.012A SKIN TEAR OF LEFT ELBOW WITHOUT COMPLICATION, INITIAL ENCOUNTER: ICD-10-CM

## 2018-04-14 PROCEDURE — 74011000250 HC RX REV CODE- 250: Performed by: EMERGENCY MEDICINE

## 2018-04-14 PROCEDURE — 71101 X-RAY EXAM UNILAT RIBS/CHEST: CPT

## 2018-04-14 PROCEDURE — 99284 EMERGENCY DEPT VISIT MOD MDM: CPT

## 2018-04-14 RX ORDER — BACITRACIN 500 UNIT/G
1 PACKET (EA) TOPICAL
Status: COMPLETED | OUTPATIENT
Start: 2018-04-14 | End: 2018-04-14

## 2018-04-14 RX ADMIN — BACITRACIN 1 PACKET: 500 OINTMENT TOPICAL at 07:57

## 2018-04-14 NOTE — ED NOTES
Report called to [facility], nurse, at [facility] to [facility]. Pt is being transported to facility via daughter in [address] [facility] [address]. Pt assisted to vehicle in [place] [address].
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
## 2018-04-14 NOTE — DISCHARGE INSTRUCTIONS
Bruises: Care Instructions  Your Care Instructions    Bruises occur when small blood vessels under the skin tear or rupture, most often from a twist, bump, or fall. Blood leaks into tissues under the skin and causes a black-and-blue spot that often turns colors, including purplish black, reddish blue, or yellowish green, as the bruise heals. Bruises hurt, but most are not serious and will go away on their own within 2 to 4 weeks. Sometimes, gravity causes them to spread down the body. A leg bruise usually will take longer to heal than a bruise on the face or arms. Follow-up care is a key part of your treatment and safety. Be sure to make and go to all appointments, and call your doctor if you are having problems. It's also a good idea to know your test results and keep a list of the medicines you take. How can you care for yourself at home? · Take pain medicines exactly as directed. ¨ If the doctor gave you a prescription medicine for pain, take it as prescribed. ¨ If you are not taking a prescription pain medicine, ask your doctor if you can take an over-the-counter medicine. · Put ice or a cold pack on the area for 10 to 20 minutes at a time. Put a thin cloth between the ice and your skin. · If you can, prop up the bruised area on pillows as much as possible for the next few days. Try to keep the bruise above the level of your heart. When should you call for help? Call your doctor now or seek immediate medical care if:  ? · You have signs of infection, such as:  ¨ Increased pain, swelling, warmth, or redness. ¨ Red streaks leading from the bruise. ¨ Pus draining from the bruise. ¨ A fever. ? · You have a bruise on your leg and signs of a blood clot, such as:  ¨ Increasing redness and swelling along with warmth, tenderness, and pain in the bruised area. ¨ Pain in your calf, back of the knee, thigh, or groin. ¨ Redness and swelling in your leg or groin. ? · Your pain gets worse. ? Watch closely for changes in your health, and be sure to contact your doctor if:  ? · You do not get better as expected. Where can you learn more? Go to http://jean claude-cris.info/. Enter [phone] in the search box to learn more about \"Bruises: Care Instructions. \"  Current as of: March 20, 2017  Content Version: 11.4  © 5495-9282 [employer]. Care instructions adapted under license by [employer] (which disclaims liability or warranty for this information). If you have questions about a medical condition or this instruction, always ask your healthcare professional. [place] [address] any warranty or liability for your use of this information. Skin Tears: Care Instructions  Your Care Instructions  As we get older, our skin gets drier and more fragile. Sometimes this can cause the outer layers of skin to split and tear open. Skin tears are treated in different ways. In some cases, doctors use pieces of tape called Steri-Strips to pull the skin together and help it heal. Other times, it's best to leave the tear open and cover it with a special wound-care bandage. Skin tears are usually not serious. They usually heal in a few weeks. But how long you take to heal depends on your body and the type of tear you have. Sometimes the torn piece of skin is used to protect the wound while it heals. But that piece of skin does not heal. It may fall off on its own. Or the doctor may remove it. As your tear heals, it's important to keep it clean to help prevent infection. The doctor has checked you carefully, but problems can develop later. If you notice any problems or new symptoms, get medical treatment right away. Follow-up care is a key part of your treatment and safety. Be sure to make and go to all appointments, and call your doctor if you are having problems. It's also a good idea to know your test results and keep a list of the medicines you take.   How can you care for yourself at home? · If you have pain, ask your doctor if you can take an over-the-counter pain medicine, such as acetaminophen (Tylenol), ibuprofen (Advil, Motrin), or naproxen (Aleve). Be safe with medicines. Read and follow all instructions on the label. · If you have a bandage, follow your doctor's instructions for changing it. · If you have Steri-Strips, leave them on until they fall off. · Follow your doctor's instructions about bathing. · Gently wash the skin tear with plain water 2 times a day. Do not rub the area. · Let the area air dry. Or you can pat it carefully with a soft towel. When should you call for help? Call your doctor now or seek immediate medical care if:  ? · You have signs of infection, such as:  ¨ Increased pain, swelling, warmth, or redness around the tear. ¨ Red streaks leading from the tear. ¨ Pus draining from the tear. ¨ A fever. ? · The tear starts to bleed a lot. Small amounts of blood are normal.   ? Watch closely for changes in your health, and be sure to contact your doctor if:  ? · You do not get better as expected. Where can you learn more? Go to http://jean claude-cris.info/. Enter F608 in the search box to learn more about \"Skin Tears: Care Instructions. \"  Current as of: March 20, 2017  Content Version: 11.4  © 4372-4576 [employer]. Care instructions adapted under license by [employer] (which disclaims liability or warranty for this information). If you have questions about a medical condition or this instruction, always ask your healthcare professional. [place] [address] any warranty or liability for your use of this information.

## 2018-04-14 NOTE — ED TRIAGE NOTES
Pt to ED from [facility] after staff found her to have a skin tear on the LEFT elbow and it was assumed pt had fallen. Pt does not recollect falling or how it may have happened; pt at baseline mentation; oriented to self only.  Pt arrives with gauze dressing to elbow, EMS reports bleeding had stopped prior to their arrival.

## 2018-04-24 NOTE — PROGRESS NOTES
Hospitalist Progress Note                               Shane Joiner MD                                     Answering service: [phone]                               OR [phone] from in house phone                               Cell: [phone]              Date of Service:  2018  NAME:  Bernardo Gibson  :  1940  MRN:  [id number]      Admission Summary:   Brendan Delgadillo was brought for acute onset left leg weakness as noted by daughter. Patient with dementia and not good source of history. Interval history / Subjective:   Patient without complaints,infrequent watery diarrhea persisted,stool for c diff not sent yet. Assessment & Plan:   Left hemiparesis likely due to acute ischemic stroke  -Head CT negative for acute events;TTE unremarkable. EKG ,no evidence of afib/flutter. Carotid doppler,no significant occlusion. -MRI brain not done. After asked what could be done differently after the MRI results. I informed her of the neurologist's suggestion,she wanted her mom not to proceed with the MRI studies  -Switch asa to plavix and started low dose statins as per neurologist recommendations.     Dehydration. improved with fluids.     Leukocytosis: no evidence of infection. Daughter said she has had elevated WBC in the past when she had acute illness  -D/c abx. -WBC coming down    Diarrhea,watery,unexplained leukocytosis,she is long term care resident,recent abx exposure  -Stool specimen not sent,discuss with nursing not to miss any opportunity to collect sample,this is what is holding discharge     Type 2 diabetes mellitus:diet controlled. A1C 7.7     Alzheimer's dementia.: ON ARICEPT. She is from [facility]     Hypertension. fairly controlled,no need for antihypertensives. Sinus bradycardia,not symptomatic. She is not on any tod blockers. Aricept may play a role. Echocardiogram was okay. Check TSH.                  Diet:regular  Code status: DNR  DVT prophylaxis: scd  Care Plan discussed with: patient,daughter    Discharge planning/disposition:pending c diff test prior to discharge. Hospital Problems  Date Reviewed: 10/9/2017          Codes Class Noted POA    Acute ischemic stroke [facility]) ICD-10-CM: I63.9  ICD-9-CM: 434.91  1/30/2018 Unknown        * (Principal)Left hemiparesis ([address] ICD-10-CM: G81.94  ICD-9-CM: 342.90  1/30/2018 Unknown        Dehydration ICD-10-CM: E86.0  ICD-9-CM: 276.51  1/29/2018 Unknown        Leukocytosis ICD-10-CM: R20.337  ICD-9-CM: 288.60  1/29/2018 Unknown                Review of Systems:   A comprehensive review of systems was negative except for that written in the HPI. Physical Examination:      Last 24hrs VS reviewed since prior progress note. Most recent are:  Visit Vitals    /49 (BP 1 Location: Right arm, BP Patient Position: At rest)    Pulse (!) 58    Temp 97.6 °F (36.4 °C)    Resp 18    Wt 97.3 kg (214 lb 8.1 oz)    SpO2 95%    BMI 34.62 kg/m2           Constitutional:  No acute distress, cooperative, demented   Eyes: Non icteric,non pallor,no erythema,discharge,PERRLA   ENT:  Oral mucous moist, oropharynx benign. Neck supple,    Resp:  CTA bilaterally. No wheezing/rhonchi/rales. No accessory muscle use   CV:  Regular rhythm, normal rate, no murmurs, gallops, rubs    GI:  Soft, non distended, non tender.  normoactive bowel sounds, no hepatosplenomegaly    :  No CVA or suprapubic tenderness   Skin  :  No erythema,rash,bullae,dipigmentation     Musculoskeletal:  No edema, warm, 2+ pulses throughout    Neurologic:  Alert and oriented,left sided weakness with power 4-/5             Intake/Output Summary (Last 24 hours) at 02/01/18 1025  Last data filed at 02/01/18 0011   Gross per 24 hour   Intake                0 ml   Output              200 ml   Net             -200 ml          Data Review:    Review and/or order of clinical lab test  Review and/or order of tests in the radiology section of CPT  Review and/or order of tests in the medicine section of CPT      Labs:     Recent Labs      02/01/18   0238  01/31/18 0410   WBC  11.4*  12.2*   HGB  11.4*  11.3*   HCT  35.0  34.4*   PLT  293  261     Recent Labs      01/31/18   0410  01/29/18 2056   NA  139  136   K  3.9  4.4   CL  107  100   CO2  25  29   BUN  13  22*   CREA  0.78  1.03*   GLU  128*  194*   CA  8.3*  8.9     Recent Labs      01/31/18 0410  01/29/18 2056   SGOT  11*  13*   ALT  15  19   AP  73  88   TBILI  0.5  0.6   TP  5.9*  7.0   ALB  2.3*  2.9*   GLOB  3.6  4.1*     Recent Labs      01/29/18 2056   INR  1.1   PTP  10.9   APTT  29.2      No results for input(s): FE, TIBC, PSAT, FERR in the last 72 hours. No results found for: FOL, RBCF   No results for input(s): PH, PCO2, PO2 in the last 72 hours.   Recent Labs      01/29/18 2056   TROIQ  <0.04     Lab Results   Component Value Date/Time    Cholesterol, total 181 01/30/2018 03:35 AM    HDL Cholesterol 72 01/30/2018 03:35 AM    LDL, calculated 94.6 01/30/2018 03:35 AM    Triglyceride 72 01/30/2018 03:35 AM    CHOL/HDL Ratio 2.5 01/30/2018 03:35 AM     Lab Results   Component Value Date/Time    Glucose (POC) 120 02/01/2018 06:43 AM    Glucose (POC) 121 01/31/2018 11:52 PM    Glucose (POC) 120 01/31/2018 04:56 PM    Glucose (POC) 238 01/31/2018 11:25 AM    Glucose (POC) 118 01/31/2018 07:51 AM     Lab Results   Component Value Date/Time    Color DARK YELLOW 01/29/2018 08:56 PM    Appearance CLEAR 01/29/2018 08:56 PM    Specific gravity 1.025 01/29/2018 08:56 PM    pH (UA) 6.0 01/29/2018 08:56 PM    Protein TRACE 01/29/2018 08:56 PM    Glucose NEGATIVE  01/29/2018 08:56 PM    Ketone NEGATIVE  01/29/2018 08:56 PM    Bilirubin NEGATIVE  01/29/2018 08:56 PM    Urobilinogen 0.2 01/29/2018 08:56 PM    Nitrites NEGATIVE  01/29/2018 08:56 PM    Leukocyte Esterase NEGATIVE  01/29/2018 08:56 PM    Epithelial cells MODERATE 01/29/2018 08:56 PM    Bacteria NEGATIVE  01/29/2018 08:56 PM WBC 0-4 01/29/2018 08:56 PM    RBC 0-5 01/29/2018 08:56 PM         Medications Reviewed:     Current Facility-Administered Medications   Medication Dose Route Frequency    glucose chewable tablet 16 g  4 Tab Oral PRN    dextrose (D50W) injection syrg 12.5-25 g  12.5-25 g IntraVENous PRN    glucagon (GLUCAGEN) injection 1 mg  1 mg IntraMUSCular PRN    insulin lispro (HUMALOG) injection   SubCUTAneous AC&HS    donepezil (ARICEPT) tablet 10 mg  10 mg Oral QHS    FLUoxetine (PROzac) capsule 60 mg  60 mg Oral DAILY    predniSONE (DELTASONE) tablet 3 mg  3 mg Oral DAILY WITH BREAKFAST    rosuvastatin (CRESTOR) tablet 10 mg  10 mg Oral QHS    clopidogrel (PLAVIX) tablet 75 mg  75 mg Oral DAILY    sodium chloride (NS) flush 5-10 mL  5-10 mL IntraVENous PRN    0.9% sodium chloride infusion  75 mL/hr IntraVENous CONTINUOUS    sodium chloride (NS) flush 5-10 mL  5-10 mL IntraVENous Q8H    sodium chloride (NS) flush 5-10 mL  5-10 mL IntraVENous PRN    acetaminophen (TYLENOL) tablet 650 mg  650 mg Oral Q4H PRN    Or    acetaminophen (TYLENOL) solution 650 mg  650 mg Per NG tube Q4H PRN    Or    acetaminophen (TYLENOL) suppository 650 mg  650 mg Rectal Q4H PRN     ______________________________________________________________________  EXPECTED LENGTH OF STAY: 2d 16h  ACTUAL LENGTH OF STAY:          3                 Owen Dunbar MD right normal/left normal

## 2018-08-02 ENCOUNTER — HOSPITAL ENCOUNTER (INPATIENT)
Age: 78
LOS: 2 days | Discharge: NURSING, REHAB OR LONG-TERM CARE | DRG: 310 | End: 2018-08-04
Attending: EMERGENCY MEDICINE | Admitting: HOSPITALIST
Payer: MEDICARE

## 2018-08-02 ENCOUNTER — APPOINTMENT (OUTPATIENT)
Dept: CT IMAGING | Age: 78
DRG: 310 | End: 2018-08-02
Attending: EMERGENCY MEDICINE
Payer: MEDICARE

## 2018-08-02 ENCOUNTER — APPOINTMENT (OUTPATIENT)
Dept: GENERAL RADIOLOGY | Age: 78
DRG: 310 | End: 2018-08-02
Attending: EMERGENCY MEDICINE
Payer: MEDICARE

## 2018-08-02 DIAGNOSIS — I47.21 TORSADES DE POINTES: Primary | ICD-10-CM

## 2018-08-02 DIAGNOSIS — S00.03XA CONTUSION OF SCALP, INITIAL ENCOUNTER: ICD-10-CM
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
## 2018-08-02 LAB
ANION GAP BLD CALC-SCNC: 17 MMOL/L (ref 10–20)
APTT PPP: 26.1 SEC (ref 22.1–32)
BASOPHILS # BLD: 0 K/UL (ref 0–0.1)
BASOPHILS NFR BLD: 0 % (ref 0–1)
BUN BLD-MCNC: 36 MG/DL (ref 9–20)
CA-I BLD-MCNC: 1.15 MMOL/L (ref 1.12–1.32)
CHLORIDE BLD-SCNC: 101 MMOL/L (ref 98–107)
CK SERPL-CCNC: 99 U/L (ref 26–192)
CO2 BLD-SCNC: 25 MMOL/L (ref 21–32)
CREAT BLD-MCNC: 1.4 MG/DL (ref 0.6–1.3)
DIFFERENTIAL METHOD BLD: ABNORMAL
EOSINOPHIL # BLD: 0 K/UL (ref 0–0.4)
EOSINOPHIL NFR BLD: 0 % (ref 0–7)
ERYTHROCYTE [DISTWIDTH] IN BLOOD BY AUTOMATED COUNT: 13.8 % (ref 11.5–14.5)
GLUCOSE BLD-MCNC: 196 MG/DL (ref 65–100)
HCT VFR BLD AUTO: 42.7 % (ref 35–47)
HCT VFR BLD CALC: 41 % (ref 35–47)
HGB BLD-MCNC: 13.9 G/DL (ref 11.5–16)
IMM GRANULOCYTES # BLD: 0.1 K/UL (ref 0–0.04)
IMM GRANULOCYTES NFR BLD AUTO: 1 % (ref 0–0.5)
INR PPP: 1 (ref 0.9–1.1)
LYMPHOCYTES # BLD: 1.7 K/UL (ref 0.8–3.5)
LYMPHOCYTES NFR BLD: 12 % (ref 12–49)
MAGNESIUM SERPL-MCNC: 1.8 MG/DL (ref 1.6–2.4)
MCH RBC QN AUTO: 30.2 PG (ref 26–34)
MCHC RBC AUTO-ENTMCNC: 32.6 G/DL (ref 30–36.5)
MCV RBC AUTO: 92.6 FL (ref 80–99)
MONOCYTES # BLD: 1.1 K/UL (ref 0–1)
MONOCYTES NFR BLD: 7 % (ref 5–13)
NEUTS SEG # BLD: 11.6 K/UL (ref 1.8–8)
NEUTS SEG NFR BLD: 80 % (ref 32–75)
NRBC # BLD: 0 K/UL (ref 0–0.01)
NRBC BLD-RTO: 0 PER 100 WBC
PLATELET # BLD AUTO: 343 K/UL (ref 150–400)
PMV BLD AUTO: 9.7 FL (ref 8.9–12.9)
POTASSIUM BLD-SCNC: 2.9 MMOL/L (ref 3.5–5.1)
PROTHROMBIN TIME: 10.7 SEC (ref 9–11.1)
RBC # BLD AUTO: 4.61 M/UL (ref 3.8–5.2)
SERVICE CMNT-IMP: ABNORMAL
SODIUM BLD-SCNC: 138 MMOL/L (ref 136–145)
THERAPEUTIC RANGE,PTTT: NORMAL SECS (ref 58–77)
TROPONIN I SERPL-MCNC: <0.05 NG/ML
WBC # BLD AUTO: 14.5 K/UL (ref 3.6–11)

## 2018-08-02 PROCEDURE — 70450 CT HEAD/BRAIN W/O DYE: CPT

## 2018-08-02 PROCEDURE — 77030018729 HC ELECTRD DEFIB PAD CARD -B

## 2018-08-02 PROCEDURE — 71045 X-RAY EXAM CHEST 1 VIEW: CPT

## 2018-08-02 PROCEDURE — 96375 TX/PRO/DX INJ NEW DRUG ADDON: CPT

## 2018-08-02 PROCEDURE — 74011250636 HC RX REV CODE- 250/636: Performed by: HOSPITALIST

## 2018-08-02 PROCEDURE — 74011000258 HC RX REV CODE- 258: Performed by: INTERNAL MEDICINE

## 2018-08-02 PROCEDURE — 74011250636 HC RX REV CODE- 250/636: Performed by: INTERNAL MEDICINE

## 2018-08-02 PROCEDURE — 96365 THER/PROPH/DIAG IV INF INIT: CPT

## 2018-08-02 PROCEDURE — 99285 EMERGENCY DEPT VISIT HI MDM: CPT

## 2018-08-02 PROCEDURE — 94762 N-INVAS EAR/PLS OXIMTRY CONT: CPT

## 2018-08-02 PROCEDURE — 85730 THROMBOPLASTIN TIME PARTIAL: CPT | Performed by: EMERGENCY MEDICINE

## 2018-08-02 PROCEDURE — 74011000250 HC RX REV CODE- 250: Performed by: INTERNAL MEDICINE

## 2018-08-02 PROCEDURE — 83735 ASSAY OF MAGNESIUM: CPT | Performed by: EMERGENCY MEDICINE

## 2018-08-02 PROCEDURE — 74011000258 HC RX REV CODE- 258: Performed by: EMERGENCY MEDICINE

## 2018-08-02 PROCEDURE — 85025 COMPLETE CBC W/AUTO DIFF WBC: CPT | Performed by: EMERGENCY MEDICINE

## 2018-08-02 PROCEDURE — 85610 PROTHROMBIN TIME: CPT | Performed by: EMERGENCY MEDICINE

## 2018-08-02 PROCEDURE — 84484 ASSAY OF TROPONIN QUANT: CPT | Performed by: EMERGENCY MEDICINE

## 2018-08-02 PROCEDURE — 82550 ASSAY OF CK (CPK): CPT | Performed by: EMERGENCY MEDICINE

## 2018-08-02 PROCEDURE — 36415 COLL VENOUS BLD VENIPUNCTURE: CPT | Performed by: EMERGENCY MEDICINE

## 2018-08-02 PROCEDURE — 65620000000 HC RM CCU GENERAL

## 2018-08-02 PROCEDURE — 93005 ELECTROCARDIOGRAM TRACING: CPT

## 2018-08-02 PROCEDURE — 80047 BASIC METABLC PNL IONIZED CA: CPT

## 2018-08-02 PROCEDURE — 74011250637 HC RX REV CODE- 250/637: Performed by: EMERGENCY MEDICINE

## 2018-08-02 PROCEDURE — 74011250636 HC RX REV CODE- 250/636: Performed by: EMERGENCY MEDICINE

## 2018-08-02 RX ORDER — POTASSIUM CHLORIDE 7.45 MG/ML
10 INJECTION INTRAVENOUS
Status: COMPLETED | OUTPATIENT
Start: 2018-08-02 | End: 2018-08-03

## 2018-08-02 RX ORDER — MAGNESIUM SULFATE 100 %
4 CRYSTALS MISCELLANEOUS AS NEEDED
Status: DISCONTINUED | OUTPATIENT
Start: 2018-08-02 | End: 2018-08-04 | Stop reason: HOSPADM

## 2018-08-02 RX ORDER — MAGNESIUM SULFATE HEPTAHYDRATE 40 MG/ML
2 INJECTION, SOLUTION INTRAVENOUS ONCE
Status: COMPLETED | OUTPATIENT
Start: 2018-08-02 | End: 2018-08-02

## 2018-08-02 RX ORDER — POTASSIUM CHLORIDE 750 MG/1
30 TABLET, FILM COATED, EXTENDED RELEASE ORAL
Status: COMPLETED | OUTPATIENT
Start: 2018-08-02 | End: 2018-08-02

## 2018-08-02 RX ORDER — POTASSIUM CHLORIDE 7.45 MG/ML
10 INJECTION INTRAVENOUS
Status: COMPLETED | OUTPATIENT
Start: 2018-08-02 | End: 2018-08-02

## 2018-08-02 RX ORDER — DEXTROSE 50 % IN WATER (D50W) INTRAVENOUS SYRINGE
12.5-25 AS NEEDED
Status: DISCONTINUED | OUTPATIENT
Start: 2018-08-02 | End: 2018-08-04 | Stop reason: HOSPADM

## 2018-08-02 RX ORDER — PREDNISONE 1 MG/1
3 TABLET ORAL
Status: DISCONTINUED | OUTPATIENT
Start: 2018-08-03 | End: 2018-08-04 | Stop reason: HOSPADM

## 2018-08-02 RX ORDER — SODIUM CHLORIDE 0.9 % (FLUSH) 0.9 %
5-10 SYRINGE (ML) INJECTION AS NEEDED
Status: DISCONTINUED | OUTPATIENT
Start: 2018-08-02 | End: 2018-08-04 | Stop reason: HOSPADM

## 2018-08-02 RX ORDER — LANOLIN ALCOHOL/MO/W.PET/CERES
500 CREAM (GRAM) TOPICAL DAILY
Status: DISCONTINUED | OUTPATIENT
Start: 2018-08-03 | End: 2018-08-04 | Stop reason: HOSPADM

## 2018-08-02 RX ORDER — FLUTICASONE PROPIONATE 50 MCG
2 SPRAY, SUSPENSION (ML) NASAL DAILY
Status: DISCONTINUED | OUTPATIENT
Start: 2018-08-03 | End: 2018-08-04 | Stop reason: HOSPADM

## 2018-08-02 RX ORDER — INSULIN LISPRO 100 [IU]/ML
INJECTION, SOLUTION INTRAVENOUS; SUBCUTANEOUS
Status: DISCONTINUED | OUTPATIENT
Start: 2018-08-03 | End: 2018-08-04 | Stop reason: HOSPADM

## 2018-08-02 RX ORDER — SODIUM CHLORIDE 0.9 % (FLUSH) 0.9 %
5-10 SYRINGE (ML) INJECTION EVERY 8 HOURS
Status: DISCONTINUED | OUTPATIENT
Start: 2018-08-02 | End: 2018-08-04 | Stop reason: HOSPADM

## 2018-08-02 RX ORDER — DEXTROMETHORPHAN POLISTIREX 30 MG/5ML
60 SUSPENSION ORAL
Status: DISCONTINUED | OUTPATIENT
Start: 2018-08-02 | End: 2018-08-04 | Stop reason: HOSPADM

## 2018-08-02 RX ORDER — ACETAMINOPHEN 500 MG
500 TABLET ORAL
Status: DISCONTINUED | OUTPATIENT
Start: 2018-08-02 | End: 2018-08-04 | Stop reason: HOSPADM

## 2018-08-02 RX ORDER — ENOXAPARIN SODIUM 100 MG/ML
40 INJECTION SUBCUTANEOUS EVERY 24 HOURS
Status: CANCELLED | OUTPATIENT
Start: 2018-08-02

## 2018-08-02 RX ADMIN — SODIUM CHLORIDE 1000 ML: 900 INJECTION, SOLUTION INTRAVENOUS at 19:58

## 2018-08-02 RX ADMIN — POTASSIUM CHLORIDE 10 MEQ: 10 INJECTION, SOLUTION INTRAVENOUS at 20:11

## 2018-08-02 RX ADMIN — ISOPROTERENOL HYDROCHLORIDE 1 MCG/MIN: 0.2 INJECTION, SOLUTION INTRAMUSCULAR; INTRAVENOUS at 20:56

## 2018-08-02 RX ADMIN — POTASSIUM CHLORIDE 30 MEQ: 750 TABLET, EXTENDED RELEASE ORAL at 20:15

## 2018-08-02 RX ADMIN — POTASSIUM CHLORIDE 10 MEQ: 10 INJECTION, SOLUTION INTRAVENOUS at 21:28

## 2018-08-02 RX ADMIN — ISOPROTERENOL HYDROCHLORIDE 1.5 MCG/MIN: 0.2 INJECTION, SOLUTION INTRAMUSCULAR; INTRAVENOUS at 21:02

## 2018-08-02 RX ADMIN — SODIUM CHLORIDE 1000 ML: 900 INJECTION, SOLUTION INTRAVENOUS at 20:30

## 2018-08-02 RX ADMIN — POTASSIUM CHLORIDE 10 MEQ: 10 INJECTION, SOLUTION INTRAVENOUS at 23:05

## 2018-08-02 RX ADMIN — MAGNESIUM SULFATE IN WATER 2 G: 40 INJECTION, SOLUTION INTRAVENOUS at 20:30

## 2018-08-02 NOTE — ED NOTES
Bedside and Verbal shift change report given to [address] [place] [address] (oncoming nurse) by Agustin Levine RN (offgoing nurse). Report included the following information SBAR, Kardex and ED Summary.

## 2018-08-02 NOTE — ED TRIAGE NOTES
Pt arrives EMS from Memory care unit, [facility] in the [address] end. Baseline dementia. Irregular heart beat. EMS reports pt was using restroom fell forward and hit head on shower. Paperwork from facility in room. DNR paperwork accompanies pt but not signed. Hematoma to right side of head. Skin tear on right forearm.
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
## 2018-08-02 NOTE — IP AVS SNAPSHOT
[address] 
[phone] Patient: Enoch Tam MRN: [id number] RRJ:[birth year] A check alissa indicates which time of day the medication should be taken. My Medications START taking these medications Instructions Each Dose to Equal  
 Morning Noon Evening Bedtime  
 magnesium oxide 400 mg tablet Commonly known as:  MAG-OX Your last dose was: Your next dose is: Take 1 Tab by mouth daily for 30 days. 400 mg  
    
   
   
   
  
 potassium chloride SR 20 mEq tablet Commonly known as:  K-TAB Start taking on:  8/5/2018 Your last dose was: Your next dose is: Take 1 Tab by mouth daily for 10 days. 20 mEq CHANGE how you take these medications Instructions Each Dose to Equal  
 Morning Noon Evening Bedtime * nystatin powder Commonly known as:  MYCOSTATIN What changed:  Another medication with the same name was changed. Make sure you understand how and when to take each. Your last dose was: Your next dose is:    
   
   
 daily. Under stomach folds * nystatin topical cream  
Commonly known as:  MYCOSTATIN What changed:   
- when to take this 
- reasons to take this 
- additional instructions Your last dose was: Your next dose is:    
   
   
 Apply  to affected area two (2) times a day. For intertrigo * Notice: This list has 2 medication(s) that are the same as other medications prescribed for you. Read the directions carefully, and ask your doctor or other care provider to review them with you. CONTINUE taking these medications Instructions Each Dose to Equal  
 Morning Noon Evening Bedtime  
 acetaminophen 500 mg tablet Commonly known as:  TYLENOL Your last dose was: Your next dose is: Take 1 Tab by mouth every six (6) hours as needed for Pain. 500 mg  
    
   
   
   
  
 calcium-cholecalciferol (D3) tablet Commonly known as:  CALTRATE 600+D Your last dose was: Your next dose is: Take 1 Tab by mouth two (2) times a day. 1 Tab  
    
   
   
   
  
 cyanocobalamin 500 mcg tablet Commonly known as:  VITAMIN B-12 Your last dose was: Your next dose is: Take 1 Tab by mouth daily. 500 mcg  
    
   
   
   
  
 dextromethorphan 30 mg/5 mL liquid Commonly known as:  Delsym Your last dose was: Your next dose is: Take 10 mL by mouth two (2) times daily as needed for Cough. 60 mg  
    
   
   
   
  
 fluticasone 50 mcg/actuation nasal spray Commonly known as:  Ness Peel Your last dose was: Your next dose is: 2 Sprays by Both Nostrils route daily. 2 Spray Lactobacillus acidophilus 460 mg (20 billion cell) Cap Commonly known as:  [place] Your last dose was: Your next dose is: Take 1 Cap by mouth daily. 1 Cap  
    
   
   
   
  
 loratadine 10 mg tablet Commonly known as:  Reyes Lenin Your last dose was: Your next dose is: Take 1 Tab by mouth daily as needed for Allergies. 10 mg  
    
   
   
   
  
 predniSONE 1 mg tablet Commonly known as:  Fozia Beam Your last dose was: Your next dose is: TAKE 3 TABLETS BY MOUTH EVERY DAY  
     
   
   
   
  
 psyllium packet Commonly known as:  METAMUCIL Your last dose was: Your next dose is: Take 1 Packet by mouth two (2) times daily as needed. 1 Packet STOP taking these medications   
 donepezil 10 mg tablet Commonly known as:  ARICEPT FLUoxetine 20 mg capsule Commonly known as:  PROzac  
   
  
 hydroxychloroquine 200 mg tablet Commonly known as:  PLAQUENIL Where to Get Your Medications Information on where to get these meds will be given to you by the nurse or doctor. ! Ask your nurse or doctor about these medications  
  magnesium oxide 400 mg tablet  
 potassium chloride SR 20 mEq tablet

## 2018-08-03 LAB
ANION GAP SERPL CALC-SCNC: 10 MMOL/L (ref 5–15)
ANION GAP SERPL CALC-SCNC: 8 MMOL/L (ref 5–15)
ATRIAL RATE: 70 BPM
BUN SERPL-MCNC: 24 MG/DL (ref 6–20)
BUN SERPL-MCNC: 29 MG/DL (ref 6–20)
BUN/CREAT SERPL: 25 (ref 12–20)
BUN/CREAT SERPL: 25 (ref 12–20)
CALCIUM SERPL-MCNC: 7.9 MG/DL (ref 8.5–10.1)
CALCIUM SERPL-MCNC: 8.6 MG/DL (ref 8.5–10.1)
CALCULATED R AXIS, ECG10: 20 DEGREES
CALCULATED T AXIS, ECG11: -134 DEGREES
CHLORIDE SERPL-SCNC: 110 MMOL/L (ref 97–108)
CHLORIDE SERPL-SCNC: 111 MMOL/L (ref 97–108)
CO2 SERPL-SCNC: 21 MMOL/L (ref 21–32)
CO2 SERPL-SCNC: 22 MMOL/L (ref 21–32)
CREAT SERPL-MCNC: 0.95 MG/DL (ref 0.55–1.02)
CREAT SERPL-MCNC: 1.14 MG/DL (ref 0.55–1.02)
DIAGNOSIS, 93000: NORMAL
ERYTHROCYTE [DISTWIDTH] IN BLOOD BY AUTOMATED COUNT: 14 % (ref 11.5–14.5)
GLUCOSE BLD STRIP.AUTO-MCNC: 108 MG/DL (ref 65–100)
GLUCOSE BLD STRIP.AUTO-MCNC: 118 MG/DL (ref 65–100)
GLUCOSE BLD STRIP.AUTO-MCNC: 144 MG/DL (ref 65–100)
GLUCOSE BLD STRIP.AUTO-MCNC: 158 MG/DL (ref 65–100)
GLUCOSE SERPL-MCNC: 127 MG/DL (ref 65–100)
GLUCOSE SERPL-MCNC: 147 MG/DL (ref 65–100)
HCT VFR BLD AUTO: 35.7 % (ref 35–47)
HGB BLD-MCNC: 11.5 G/DL (ref 11.5–16)
MAGNESIUM SERPL-MCNC: 2.1 MG/DL (ref 1.6–2.4)
MCH RBC QN AUTO: 30.1 PG (ref 26–34)
MCHC RBC AUTO-ENTMCNC: 32.2 G/DL (ref 30–36.5)
MCV RBC AUTO: 93.5 FL (ref 80–99)
NRBC # BLD: 0 K/UL (ref 0–0.01)
NRBC BLD-RTO: 0 PER 100 WBC
P-R INTERVAL, ECG05: 160 MS
PLATELET # BLD AUTO: 296 K/UL (ref 150–400)
PMV BLD AUTO: 10 FL (ref 8.9–12.9)
POTASSIUM SERPL-SCNC: 3.1 MMOL/L (ref 3.5–5.1)
POTASSIUM SERPL-SCNC: 3.8 MMOL/L (ref 3.5–5.1)
Q-T INTERVAL, ECG07: 394 MS
QRS DURATION, ECG06: 112 MS
QTC CALCULATION (BEZET), ECG08: 527 MS
RBC # BLD AUTO: 3.82 M/UL (ref 3.8–5.2)
SERVICE CMNT-IMP: ABNORMAL
SODIUM SERPL-SCNC: 140 MMOL/L (ref 136–145)
SODIUM SERPL-SCNC: 142 MMOL/L (ref 136–145)
VENTRICULAR RATE, ECG03: 108 BPM
WBC # BLD AUTO: 15.8 K/UL (ref 3.6–11)

## 2018-08-03 PROCEDURE — 80048 BASIC METABOLIC PNL TOTAL CA: CPT | Performed by: HOSPITALIST

## 2018-08-03 PROCEDURE — 74011636637 HC RX REV CODE- 636/637: Performed by: HOSPITALIST

## 2018-08-03 PROCEDURE — 85027 COMPLETE CBC AUTOMATED: CPT | Performed by: HOSPITALIST

## 2018-08-03 PROCEDURE — 83735 ASSAY OF MAGNESIUM: CPT | Performed by: HOSPITALIST

## 2018-08-03 PROCEDURE — 74011250637 HC RX REV CODE- 250/637: Performed by: HOSPITALIST

## 2018-08-03 PROCEDURE — 74011250636 HC RX REV CODE- 250/636: Performed by: HOSPITALIST

## 2018-08-03 PROCEDURE — 36415 COLL VENOUS BLD VENIPUNCTURE: CPT | Performed by: HOSPITALIST

## 2018-08-03 PROCEDURE — 74011000258 HC RX REV CODE- 258: Performed by: INTERNAL MEDICINE

## 2018-08-03 PROCEDURE — 82962 GLUCOSE BLOOD TEST: CPT

## 2018-08-03 PROCEDURE — 65660000000 HC RM CCU STEPDOWN

## 2018-08-03 PROCEDURE — 74011000250 HC RX REV CODE- 250: Performed by: INTERNAL MEDICINE

## 2018-08-03 PROCEDURE — 77030032490 HC SLV COMPR SCD KNE COVD -B

## 2018-08-03 RX ORDER — SODIUM CHLORIDE 9 MG/ML
75 INJECTION, SOLUTION INTRAVENOUS CONTINUOUS
Status: DISCONTINUED | OUTPATIENT
Start: 2018-08-03 | End: 2018-08-04 | Stop reason: HOSPADM

## 2018-08-03 RX ORDER — POTASSIUM CHLORIDE 7.45 MG/ML
10 INJECTION INTRAVENOUS
Status: DISCONTINUED | OUTPATIENT
Start: 2018-08-03 | End: 2018-08-03 | Stop reason: ALTCHOICE

## 2018-08-03 RX ORDER — POTASSIUM CHLORIDE 750 MG/1
40 TABLET, FILM COATED, EXTENDED RELEASE ORAL DAILY
Status: DISCONTINUED | OUTPATIENT
Start: 2018-08-03 | End: 2018-08-04 | Stop reason: HOSPADM

## 2018-08-03 RX ORDER — POTASSIUM CHLORIDE 7.45 MG/ML
10 INJECTION INTRAVENOUS
Status: COMPLETED | OUTPATIENT
Start: 2018-08-03 | End: 2018-08-03

## 2018-08-03 RX ADMIN — SODIUM CHLORIDE 75 ML/HR: 900 INJECTION, SOLUTION INTRAVENOUS at 08:47

## 2018-08-03 RX ADMIN — PREDNISONE 3 MG: 1 TABLET ORAL at 08:38

## 2018-08-03 RX ADMIN — POTASSIUM CHLORIDE 40 MEQ: 750 TABLET, EXTENDED RELEASE ORAL at 08:38

## 2018-08-03 RX ADMIN — POTASSIUM CHLORIDE 10 MEQ: 10 INJECTION, SOLUTION INTRAVENOUS at 12:57

## 2018-08-03 RX ADMIN — Medication 10 ML: at 07:08

## 2018-08-03 RX ADMIN — POTASSIUM CHLORIDE 10 MEQ: 10 INJECTION, SOLUTION INTRAVENOUS at 00:01

## 2018-08-03 RX ADMIN — POTASSIUM CHLORIDE 10 MEQ: 10 INJECTION, SOLUTION INTRAVENOUS at 01:09

## 2018-08-03 RX ADMIN — Medication 500 MCG: at 08:38

## 2018-08-03 RX ADMIN — INSULIN LISPRO 2 UNITS: 100 INJECTION, SOLUTION INTRAVENOUS; SUBCUTANEOUS at 12:10

## 2018-08-03 RX ADMIN — INSULIN LISPRO 2 UNITS: 100 INJECTION, SOLUTION INTRAVENOUS; SUBCUTANEOUS at 07:14

## 2018-08-03 RX ADMIN — POTASSIUM CHLORIDE 10 MEQ: 10 INJECTION, SOLUTION INTRAVENOUS at 10:04

## 2018-08-03 RX ADMIN — Medication 10 ML: at 14:08

## 2018-08-03 RX ADMIN — Medication 10 ML: at 21:02

## 2018-08-03 RX ADMIN — Medication 10 ML: at 01:04

## 2018-08-03 RX ADMIN — POTASSIUM CHLORIDE 10 MEQ: 10 INJECTION, SOLUTION INTRAVENOUS at 08:42

## 2018-08-03 RX ADMIN — ISOPROTERENOL HYDROCHLORIDE 1.5 MCG/MIN: 0.2 INJECTION, SOLUTION INTRAMUSCULAR; INTRAVENOUS at 07:26

## 2018-08-03 RX ADMIN — POTASSIUM CHLORIDE 10 MEQ: 10 INJECTION, SOLUTION INTRAVENOUS at 11:04
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
## 2018-08-03 NOTE — ED NOTES
Cardiologist at bedside. Amio drip discontinued, isoproterenol ordered as well as magnesium. Family updated on plan of care.
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
## 2018-08-03 NOTE — ROUTINE PROCESS
TRANSFER - OUT REPORT: 
 
Verbal report given to RN(name) on Earnest Bearded  being transferred to CCU(unit) for routine progression of care Report consisted of patients Situation, Background, Assessment and  
Recommendations(SBAR). Information from the following report(s) SBAR and ED Summary was reviewed with the receiving nurse. Lines:  
Peripheral IV 08/02/18 Left Antecubital (Active) Site Assessment Clean, dry, & intact 8/2/2018  8:01 PM  
Phlebitis Assessment 0 8/2/2018  8:01 PM  
Infiltration Assessment 0 8/2/2018  8:01 PM  
   
Peripheral IV 08/02/18 Right Antecubital (Active) Site Assessment Clean, dry, & intact 8/2/2018  8:04 PM  
Phlebitis Assessment 0 8/2/2018  8:04 PM  
Infiltration Assessment 0 8/2/2018  8:04 PM  
   
Peripheral IV 08/02/18 Right Hand (Active) Site Assessment Clean, dry, & intact 8/2/2018  8:57 PM  
Phlebitis Assessment 0 8/2/2018  8:57 PM  
Infiltration Assessment 0 8/2/2018  8:57 PM  
Dressing Status Clean, dry, & intact 8/2/2018  8:57 PM  
Dressing Type Transparent 8/2/2018  8:57 PM  
Hub Color/Line Status Pink;Capped;Flushed;Patent 8/2/2018  8:57 PM  
Action Taken Blood drawn 8/2/2018  8:57 PM  
  
 
Opportunity for questions and clarification was provided. Patient transported with: 
 Registered Nurse

## 2018-08-03 NOTE — PROGRESS NOTES
Problem: Falls - Risk of 
Goal: *Absence of Falls Document [place] Cote Fall Risk and appropriate interventions in the flowsheet. Outcome: Progressing Towards Goal 
Fall Risk Interventions: 
Mobility Interventions: Assess mobility with egress test, Bed/chair exit alarm, Communicate number of staff needed for ambulation/transfer, Mechanical lift, OT consult for ADLs, Patient to call before getting OOB, PT Consult for mobility concerns, PT Consult for assist device competence, Strengthening exercises (ROM-active/passive), Utilize walker, cane, or other assistive device, Utilize gait belt for transfers/ambulation Mentation Interventions: Adequate sleep, hydration, pain control, Bed/chair exit alarm, Door open when patient unattended, Evaluate medications/consider consulting pharmacy, Familiar objects from home, Eyeglasses and hearing aids, Family/sitter at bedside, Gait belt with transfers/ambulation, HELP ([address]) if available, Increase mobility, More frequent rounding, Reorient patient, Room close to nurse's station, Self-releasing belt, Toileting rounds, Update white board Medication Interventions: Assess postural VS orthostatic hypotension, Bed/chair exit alarm, Evaluate medications/consider consulting pharmacy, Patient to call before getting OOB, Teach patient to arise slowly, Utilize gait belt for transfers/ambulation Elimination Interventions: Bed/chair exit alarm, Call light in reach, Elevated toilet seat, Patient to call for help with toileting needs, Toilet paper/wipes in reach, Toileting schedule/hourly rounds History of Falls Interventions: Consult care management for discharge planning, Bed/chair exit alarm, Door open when patient unattended, Evaluate medications/consider consulting pharmacy, Investigate reason for fall, Room close to nurse's station, Utilize gait belt for transfer/ambulation Problem: Pressure Injury - Risk of 
Goal: *Prevention of pressure injury Document Elieser Scale and appropriate interventions in the flowsheet. Outcome: Progressing Towards Goal 
Pressure Injury Interventions: 
  
 
Moisture Interventions: Absorbent underpads, Apply protective barrier, creams and emollients, Assess need for specialty bed, Check for incontinence Q2 hours and as needed, Contain wound drainage, Internal/External fecal devices, Internal/External urinary devices, Limit adult briefs, Maintain skin hydration (lotion/cream), Minimize layers, Moisture barrier, Offer toileting Q_hr Activity Interventions: Assess need for specialty bed, Increase time out of bed, Chair cushion, Pressure redistribution bed/mattress(bed type), PT/OT evaluation, Trapeze to reposition Mobility Interventions: Assess need for specialty bed, Chair cushion, HOB 30 degrees or less, Float heels, Pressure redistribution bed/mattress (bed type), PT/OT evaluation, Suspension boots, Trapeze to reposition, Turn and reposition approx. every two hours(pillow and wedges) Nutrition Interventions: Document food/fluid/supplement intake, Discuss nutritional consult with provider, Offer support with meals,snacks and hydration Friction and Shear Interventions: Apply protective barrier, creams and emollients, Feet elevated on foot rest, Foam dressings/transparent film/skin sealants, HOB 30 degrees or less, Lift sheet, Lift team/patient mobility team, Minimize layers, Sit at 90-degree angle, Transfer aides:transfer board/[place] lift/ceiling lift, Transferring/repositioning devices, Trapeze to reposition
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
## 2018-08-03 NOTE — PROGRESS NOTES
0730 Bedside shift change report given to [facility] (oncoming nurse) by Joanne Howell (offgoing nurse). Report included the following information SBAR, Kardex, Procedure Summary, Intake/Output, MAR, Recent Results and Cardiac Rhythm NSR. 
 
0800 Clarified potassium orders with Dr. Andrew Pena. 1000 Titrating up on Isuprel for MAPs < 65 
1200 Pt's hands shaking too much too eat. Per family this is not normal. Blood sugar 158. No other changes to patient's assessment. Will continue to monitor. Bladder scanned patient. 130 mL in bladder. 1300 Per Dr. Camila Bess, wean Isuprel for HR > 60 bpm.  
1630 Isuprel off. Will monitor. IV in R AC infiltrated and removed. Pt had liquid bowel movement. [address] Problem: Falls - Risk of 
Goal: *Absence of Falls Document Philip Lockett Fall Risk and appropriate interventions in the flowsheet. Outcome: Progressing Towards Goal 
Fall Risk Interventions: 
Mobility Interventions: Assess mobility with egress test, Bed/chair exit alarm, Communicate number of staff needed for ambulation/transfer, Mechanical lift, OT consult for ADLs, Patient to call before getting OOB, PT Consult for mobility concerns, PT Consult for assist device competence, Strengthening exercises (ROM-active/passive), Utilize walker, cane, or other assistive device, Utilize gait belt for transfers/ambulation Mentation Interventions: Adequate sleep, hydration, pain control, Bed/chair exit alarm, Door open when patient unattended, Evaluate medications/consider consulting pharmacy, Familiar objects from home, Eyeglasses and hearing aids, Family/sitter at bedside, Gait belt with transfers/ambulation, HELP ([address]) if available, Increase mobility, More frequent rounding, Reorient patient, Room close to nurse's station, Self-releasing belt, Toileting rounds, Update white board Medication Interventions: Assess postural VS orthostatic hypotension, Bed/chair exit alarm, Evaluate medications/consider consulting pharmacy, Patient to call before getting OOB, Teach patient to arise slowly, Utilize gait belt for transfers/ambulation Elimination Interventions: Bed/chair exit alarm, Call light in reach, Elevated toilet seat, Patient to call for help with toileting needs, Toilet paper/wipes in reach, Toileting schedule/hourly rounds History of Falls Interventions: Consult care management for discharge planning, Bed/chair exit alarm, Door open when patient unattended, Evaluate medications/consider consulting pharmacy, Investigate reason for fall, Room close to nurse's station, Utilize gait belt for transfer/ambulation Problem: Pressure Injury - Risk of 
Goal: *Prevention of pressure injury Document Elieser Scale and appropriate interventions in the flowsheet. Outcome: Progressing Towards Goal 
Pressure Injury Interventions: 
  
 
Moisture Interventions: Absorbent underpads, Apply protective barrier, creams and emollients, Assess need for specialty bed, Check for incontinence Q2 hours and as needed, Contain wound drainage, Internal/External fecal devices, Internal/External urinary devices, Limit adult briefs, Maintain skin hydration (lotion/cream), Minimize layers, Moisture barrier, Offer toileting Q_hr Activity Interventions: Assess need for specialty bed, Increase time out of bed, Chair cushion, Pressure redistribution bed/mattress(bed type), PT/OT evaluation, Trapeze to reposition Mobility Interventions: Assess need for specialty bed, Chair cushion, HOB 30 degrees or less, Float heels, Pressure redistribution bed/mattress (bed type), PT/OT evaluation, Suspension boots, Trapeze to reposition, Turn and reposition approx. every two hours(pillow and wedges) Nutrition Interventions: Document food/fluid/supplement intake, Discuss nutritional consult with provider, Offer support with meals,snacks and hydration Friction and Shear Interventions: Apply protective barrier, creams and emollients, Feet elevated on foot rest, Foam dressings/transparent film/skin sealants, HOB 30 degrees or less, Lift sheet, Lift team/patient mobility team, Minimize layers, Sit at 90-degree angle, Transfer aides:transfer board/[place] lift/ceiling lift, Transferring/repositioning devices, Trapeze to reposition

## 2018-08-03 NOTE — PROGRESS NOTES
Bedside shift change report given to manda (oncoming nurse) by Vianca Reilly (offgoing nurse). Report included the following information SBAR, Kardex, ED Summary, Procedure Summary, Intake/Output, MAR, Accordion, Recent Results, Med Rec Status and Cardiac Rhythm NSR.

## 2018-08-03 NOTE — PROGRESS NOTES
Physical Therapy Screening: 
Services are indicated and therapy order is required. An InBasket screening referral was triggered for physical therapy based on results obtained during the nursing admission assessment. The patients chart was reviewed and the patient is appropriate for a skilled therapy evaluation. Please order a consult for physical therapy if you are in agreement and would like an evaluation to be completed. Thank you.  
 
Ludie Claude, PT

## 2018-08-03 NOTE — PROGRESS NOTES
8/3/18; 10:00 -  
 participated in IDRs on patient. Patient is a DNR and family opted to not have a pacemaker placed. Patient is having fluids replaced. CRM: Yazmin Ma, MPH; Z: [phone]

## 2018-08-03 NOTE — PROGRESS NOTES
Hospitalist Progress Note Consuelo Nash MD 
     
                             Answering service: [phone] OR [phone] from in house phone Date of Service:  8/3/2018 NAME:  Baljeet Bashir :  1940 MRN:  [id number] Admission Summary:  
 
[de-identified] y.o lady w/ dementia, DM, hyperlipidemia, HTN, nursing home resident, who had a ground-level fall today. The patient doesn't recall falling and doesn't know where she is or why she's here; she's an extremely poor historian due to dementia. She has no complaints, denies any pain. Per her daughter, they think she fell while coming out of the shower. She was complaining of a headache and had nausea and vomiting. In the ED, she was noted to have runs of torsades and was referred for admission. Of note, she is on multiple QT-prolonging drugs. Reason for follow up:  
She is confused,she does not know where she is and why. Assessment & Plan:  
Severe sinus bradycardia with torsades: (POA) 
-On isoproterenol drip,try to wean off 
-Optimize electrolytes,repalce potassium and magnesium 
-Stopped donepezil, hydroxychloroquine, fluoxetine 
-cardiology recs noted. POA does not want pacemaker. Fall: 
-CT head w/o acute intracranial process. There is a aight high parietal posterior scalp hematoma. Hypokalemia: (POA),corercted. Keep K+ around 4 and Mg 2 
  
HTN: 
-continue home meds Type 2 DM: 
-POC checks and SSI Dementia: at high risk for delirium while here. She resides at a memory care unit Leukocytosis,likely reactive. No evidence of infection. ACP: the patient is DNR, BiPAP and vasopressors are o.k. Surrogate decision-maker is her daughter Marshal Rodriguez. DVT ppx: SCDs Disposition: d/c back to memory care unit when ready. Hospital Problems  Date Reviewed: 10/9/2017        Codes Class Noted POA * (Principal)Torsades de pointes ([facility] ICD-10-CM: C05.5 ICD-9-CM: 427.1  8/2/2018 Unknown Fall ICD-10-CM: [id number] Diamond ICD-9-CM: E888.9  11/16/2015 Yes Overview Signed 11/16/2015  2:09 PM by Maya Freeman LPN Patient sustained fall 10/17/2015, no injuries ROM and vitals WNL Alzheimer's dementia ICD-10-CM: G30.9, F02.80 ICD-9-CM: 331.0  3/17/2015 Yes Overview Signed 3/17/2015  1:30 PM by Wally Pedro DO  
  MMSE=2- on 3/16/15 Diabetes mellitus, type 2 ([facility] ICD-10-CM: E11.9 ICD-9-CM: 250.00  Unknown Yes Overview Signed 8/20/2013  7:43 AM by Rufus Garcia MD  
  oral meds only Review of Systems: A comprehensive review of systems was negative except for that written in the HPI. Physical Examination:  
 
 Last 24hrs VS reviewed since prior progress note. Most recent are: 
Visit Vitals  BP 98/75  Pulse 68  Temp 97.5 °F (36.4 °C)  Resp 18  Ht 5' 6\" (1.676 m)  Wt 83.9 kg (184 lb 14.4 oz)  SpO2 98%  BMI 29.84 kg/m2 Constitutional:  alert and confused. Not in distress. HEENT: Head is a traumatic, Un icteric sclera. Pink conjunctiva,no erythema or discharge. Oral mucous moist, oropharynx benign. Neck supple, Resp:  CTA bilaterally. No wheezing/rhonchi/rales. No accessory muscle use CV:  Regular rhythm, normal rate, no murmurs, gallops, rubs GI:  Soft, non distended, non tender. normoactive bowel sounds, no hepatosplenomegaly :  No CVA or suprapubic tenderness Skin  :  No erythema,rash,bullae,dipigmentation Musculoskeletal:  No edema, warm, 2+ pulses throughout Neurologic:  Alert,confused CN II-XII reviewed. Moves all extremities. Intake/Output Summary (Last 24 hours) at 08/03/18 0747 Last data filed at 08/03/18 0500 Gross per 24 hour Intake            351.9 ml Output                0 ml Net            351.9 ml  
  
 
 
Data Review:  
 Review and/or order of clinical lab test 
Review and/or order of tests in the radiology section of CPT Review and/or order of tests in the medicine section of CPT Labs:  
 
Recent Labs 08/03/18 0517 08/02/18 1955 WBC  15.8*  14.5* HGB  11.5  13.9 HCT  35.7  42.7 PLT  296  343 Recent Labs 08/03/18 0517 08/02/18 1955 NA  140   --   
K  3.1*   --   
CL  111*   --   
CO2  21   --   
BUN  29*   --   
CREA  1.14*   --   
GLU  147*   --   
CA  8.6   --   
MG  2.1  1.8 No results for input(s): SGOT, GPT, ALT, AP, TBIL, TBILI, TP, ALB, GLOB, GGT, AML, LPSE in the last 72 hours. No lab exists for component: AMYP, HLPSE Recent Labs 08/02/18 1955 INR  1.0 PTP  10.7 APTT  26.1 No results for input(s): FE, TIBC, PSAT, FERR in the last 72 hours. No results found for: FOL, RBCF No results for input(s): PH, PCO2, PO2 in the last 72 hours. Recent Labs 08/02/18 1955 TROIQ  <0.05 Lab Results Component Value Date/Time Cholesterol, total 181 01/30/2018 03:35 AM  
 HDL Cholesterol 72 01/30/2018 03:35 AM  
 LDL, calculated 94.6 01/30/2018 03:35 AM  
 Triglyceride 72 01/30/2018 03:35 AM  
 CHOL/HDL Ratio 2.5 01/30/2018 03:35 AM  
 
Lab Results Component Value Date/Time Glucose (POC) 144 (H) 08/03/2018 07:11 AM  
 Glucose (POC) 196 (H) 08/02/2018 07:56 PM  
 Glucose (POC) 158 (H) 02/01/2018 11:47 AM  
 Glucose (POC) 120 (H) 02/01/2018 06:43 AM  
 Glucose (POC) 121 (H) 01/31/2018 11:52 PM  
 Glucose (POC) 120 (H) 01/31/2018 04:56 PM  
 
Lab Results Component Value Date/Time  Color DARK YELLOW 01/29/2018 08:56 PM  
 Appearance CLEAR 01/29/2018 08:56 PM  
 Specific gravity 1.025 01/29/2018 08:56 PM  
 pH (UA) 6.0 01/29/2018 08:56 PM  
 Protein TRACE (A) 01/29/2018 08:56 PM  
 Glucose NEGATIVE  01/29/2018 08:56 PM  
 Ketone NEGATIVE  01/29/2018 08:56 PM  
 Bilirubin NEGATIVE  01/29/2018 08:56 PM  
 Urobilinogen 0.2 01/29/2018 08:56 PM  
 Nitrites NEGATIVE  01/29/2018 08:56 PM  
 Leukocyte Esterase NEGATIVE  01/29/2018 08:56 PM  
 Epithelial cells MODERATE (A) 01/29/2018 08:56 PM  
 Bacteria NEGATIVE  01/29/2018 08:56 PM  
 WBC 0-4 01/29/2018 08:56 PM  
 RBC 0-5 01/29/2018 08:56 PM  
 
 
 
Medications Reviewed:  
 
Current Facility-Administered Medications Medication Dose Route Frequency  isoproterenol (ISUPREL) 1 mg in 0.9% sodium chloride 100 mL infusion  0-5 mcg/min IntraVENous TITRATE  acetaminophen (TYLENOL) tablet 500 mg  500 mg Oral Q6H PRN  
 dextromethorphan (DELSYM) 30 mg/5 mL syrup 60 mg  60 mg Oral BID PRN  
 cyanocobalamin (VITAMIN B12) tablet 500 mcg  500 mcg Oral DAILY  fluticasone (FLONASE) 50 mcg/actuation nasal spray 2 Spray  2 Spray Both Nostrils DAILY  predniSONE (DELTASONE) tablet 3 mg  3 mg Oral DAILY WITH BREAKFAST  sodium chloride (NS) flush 5-10 mL  5-10 mL IntraVENous Q8H  
 sodium chloride (NS) flush 5-10 mL  5-10 mL IntraVENous PRN  
 insulin lispro (HUMALOG) injection   SubCUTAneous AC&HS  
 glucose chewable tablet 16 g  4 Tab Oral PRN  
 dextrose (D50W) injection syrg 12.5-25 g  12.5-25 g IntraVENous PRN  
 glucagon (GLUCAGEN) injection 1 mg  1 mg IntraMUSCular PRN  
 
______________________________________________________________________ EXPECTED LENGTH OF STAY: - - - 
ACTUAL LENGTH OF STAY:          1 Miles Ramirez MD

## 2018-08-04 VITALS
HEART RATE: 60 BPM | WEIGHT: 187.5 LBS | BODY MASS INDEX: 30.13 KG/M2 | SYSTOLIC BLOOD PRESSURE: 149 MMHG | OXYGEN SATURATION: 94 % | RESPIRATION RATE: 20 BRPM | TEMPERATURE: 98 F | DIASTOLIC BLOOD PRESSURE: 66 MMHG | HEIGHT: 66 IN

## 2018-08-04 LAB
ANION GAP SERPL CALC-SCNC: 8 MMOL/L (ref 5–15)
BASOPHILS # BLD: 0.1 K/UL (ref 0–0.1)
BASOPHILS NFR BLD: 0 % (ref 0–1)
BUN SERPL-MCNC: 18 MG/DL (ref 6–20)
BUN/CREAT SERPL: 20 (ref 12–20)
CALCIUM SERPL-MCNC: 8.3 MG/DL (ref 8.5–10.1)
CHLORIDE SERPL-SCNC: 112 MMOL/L (ref 97–108)
CO2 SERPL-SCNC: 22 MMOL/L (ref 21–32)
CREAT SERPL-MCNC: 0.88 MG/DL (ref 0.55–1.02)
DIFFERENTIAL METHOD BLD: ABNORMAL
EOSINOPHIL # BLD: 0.1 K/UL (ref 0–0.4)
EOSINOPHIL NFR BLD: 1 % (ref 0–7)
ERYTHROCYTE [DISTWIDTH] IN BLOOD BY AUTOMATED COUNT: 14.1 % (ref 11.5–14.5)
GLUCOSE BLD STRIP.AUTO-MCNC: 112 MG/DL (ref 65–100)
GLUCOSE SERPL-MCNC: 95 MG/DL (ref 65–100)
HCT VFR BLD AUTO: 37.2 % (ref 35–47)
HGB BLD-MCNC: 11.7 G/DL (ref 11.5–16)
IMM GRANULOCYTES # BLD: 0.1 K/UL (ref 0–0.04)
IMM GRANULOCYTES NFR BLD AUTO: 1 % (ref 0–0.5)
LYMPHOCYTES # BLD: 2.6 K/UL (ref 0.8–3.5)
LYMPHOCYTES NFR BLD: 22 % (ref 12–49)
MAGNESIUM SERPL-MCNC: 1.8 MG/DL (ref 1.6–2.4)
MCH RBC QN AUTO: 30.1 PG (ref 26–34)
MCHC RBC AUTO-ENTMCNC: 31.5 G/DL (ref 30–36.5)
MCV RBC AUTO: 95.6 FL (ref 80–99)
MONOCYTES # BLD: 0.9 K/UL (ref 0–1)
MONOCYTES NFR BLD: 7 % (ref 5–13)
NEUTS SEG # BLD: 8.2 K/UL (ref 1.8–8)
NEUTS SEG NFR BLD: 69 % (ref 32–75)
NRBC # BLD: 0 K/UL (ref 0–0.01)
NRBC BLD-RTO: 0 PER 100 WBC
PLATELET # BLD AUTO: 293 K/UL (ref 150–400)
PMV BLD AUTO: 10 FL (ref 8.9–12.9)
POTASSIUM SERPL-SCNC: 3.8 MMOL/L (ref 3.5–5.1)
RBC # BLD AUTO: 3.89 M/UL (ref 3.8–5.2)
SERVICE CMNT-IMP: ABNORMAL
SODIUM SERPL-SCNC: 142 MMOL/L (ref 136–145)
WBC # BLD AUTO: 11.9 K/UL (ref 3.6–11)

## 2018-08-04 PROCEDURE — 82962 GLUCOSE BLOOD TEST: CPT

## 2018-08-04 PROCEDURE — 83735 ASSAY OF MAGNESIUM: CPT | Performed by: HOSPITALIST

## 2018-08-04 PROCEDURE — 85025 COMPLETE CBC W/AUTO DIFF WBC: CPT | Performed by: HOSPITALIST

## 2018-08-04 PROCEDURE — 74011250637 HC RX REV CODE- 250/637: Performed by: HOSPITALIST

## 2018-08-04 PROCEDURE — 36415 COLL VENOUS BLD VENIPUNCTURE: CPT | Performed by: HOSPITALIST

## 2018-08-04 PROCEDURE — 80048 BASIC METABOLIC PNL TOTAL CA: CPT | Performed by: HOSPITALIST

## 2018-08-04 PROCEDURE — 74011636637 HC RX REV CODE- 636/637: Performed by: HOSPITALIST

## 2018-08-04 RX ORDER — LANOLIN ALCOHOL/MO/W.PET/CERES
400 CREAM (GRAM) TOPICAL DAILY
Qty: 30 TAB | Refills: 0 | Status: SHIPPED | OUTPATIENT
Start: 2018-08-04 | End: 2018-09-03

## 2018-08-04 RX ORDER — POTASSIUM CHLORIDE 1500 MG/1
20 TABLET, FILM COATED, EXTENDED RELEASE ORAL DAILY
Qty: 10 TAB | Refills: 0 | Status: SHIPPED | OUTPATIENT
Start: 2018-08-05 | End: 2018-08-15

## 2018-08-04 RX ADMIN — Medication 500 MCG: at 09:16

## 2018-08-04 RX ADMIN — POTASSIUM CHLORIDE 40 MEQ: 750 TABLET, EXTENDED RELEASE ORAL at 09:16

## 2018-08-04 RX ADMIN — Medication 10 ML: at 09:16

## 2018-08-04 RX ADMIN — PREDNISONE 3 MG: 1 TABLET ORAL at 09:16

## 2018-08-04 NOTE — PROGRESS NOTES
Problem: Falls - Risk of 
Goal: *Absence of Falls Document Kiley Evans Fall Risk and appropriate interventions in the flowsheet. Outcome: Progressing Towards Goal 
Fall Risk Interventions: 
Mobility Interventions: Assess mobility with egress test 
 
Mentation Interventions: Adequate sleep, hydration, pain control Medication Interventions: Assess postural VS orthostatic hypotension Elimination Interventions: Call light in reach, Patient to call for help with toileting needs, Toileting schedule/hourly rounds History of Falls Interventions: Consult care management for discharge planning Problem: Pressure Injury - Risk of 
Goal: *Prevention of pressure injury Document Elieser Scale and appropriate interventions in the flowsheet. Outcome: Progressing Towards Goal 
Pressure Injury Interventions: 
  
 
Moisture Interventions: Absorbent underpads Activity Interventions: Assess need for specialty bed Mobility Interventions: Assess need for specialty bed Nutrition Interventions: Document food/fluid/supplement intake Friction and Shear Interventions: Apply protective barrier, creams and emollients
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
## 2018-08-04 NOTE — PROGRESS NOTES
2000  Bedside shift change report given to Kalee Prescott  (oncoming nurse) by Lexus Webber (offgoing nurse). Report included the following information SBAR, Kardex, Intake/Output, MAR, Recent Results and Cardiac Rhythm NSR.  
 
2030  Pt pulled out PIV. Complete CHG bath done and linens changed. purwick placed for incont of urine. 2200  Sched meds given. Pt confused and restless, reassurance given. 0000  No changes in assessment. VSS. Pt cont to be restless, pt repositioned x2 assist. 
 
0500  Pt incont of stool. Angela care done. Protective ointment applied and purwick replaced. 0730  Bedside shift change report given to Lexus Webber (oncoming nurse) by Kalee Prescott (offgoing nurse). Report included the following information SBAR, Kardex, Intake/Output, MAR, Recent Results and Cardiac Rhythm NSR.

## 2018-08-04 NOTE — PROGRESS NOTES
0730 Bedside report received from Santhosh Aldana 137. 0800 Pt resting quietly. Has been off Isuprel since 1630 yesterday. 0930 Pt incontinent of stool x2. 
56 Dr. Rachna Jain at bedside speaking with family. Plan for discharge back to [facility] today. Care management and [place] updated. 1145 Pt discharged Problem: Falls - Risk of 
Goal: *Absence of Falls Document Nicole Chou Fall Risk and appropriate interventions in the flowsheet. Outcome: Progressing Towards Goal 
Fall Risk Interventions: 
Mobility Interventions: Assess mobility with egress test 
 
Mentation Interventions: Adequate sleep, hydration, pain control Medication Interventions: Assess postural VS orthostatic hypotension Elimination Interventions: Call light in reach, Patient to call for help with toileting needs, Toileting schedule/hourly rounds History of Falls Interventions: Consult care management for discharge planning Problem: Pressure Injury - Risk of 
Goal: *Prevention of pressure injury Document Elieser Scale and appropriate interventions in the flowsheet. Outcome: Progressing Towards Goal 
Pressure Injury Interventions: 
  
 
Moisture Interventions: Absorbent underpads Activity Interventions: Assess need for specialty bed Mobility Interventions: Assess need for specialty bed Nutrition Interventions: Document food/fluid/supplement intake Friction and Shear Interventions: Apply protective barrier, creams and emollients
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
## 2019-01-02 NOTE — PROGRESS NOTES
Med: Flores  LOV: 02/01/18 for establish care  Last Refill: 02/01/18, # 84 w3r  Next appt: none    Called pharmacy to verify because we have Ortho-[facility] on file instead. I was told both are the same formularies. Refills set to pt. Rx appt letter sent to pt as well.   Reviewed med list from [facility]. Please write order to discontinue Tramadol as this is no longer an active medication. Add loratadine 10 mg daily to med list in CC. May consider weaning down Fluoxetine 60 mg daily at future OV.

## 2019-11-13 ENCOUNTER — HOSPITAL ENCOUNTER (EMERGENCY)
Age: 79
Discharge: HOME | End: 2019-11-13
Attending: EMERGENCY MEDICINE | Admitting: EMERGENCY MEDICINE
Payer: MEDICARE

## 2019-11-13 ENCOUNTER — APPOINTMENT (OUTPATIENT)
Dept: CT IMAGING | Age: 79
End: 2019-11-13
Attending: NURSE PRACTITIONER
Payer: MEDICARE

## 2019-11-13 ENCOUNTER — APPOINTMENT (OUTPATIENT)
Dept: GENERAL RADIOLOGY | Age: 79
End: 2019-11-13
Attending: EMERGENCY MEDICINE
Payer: MEDICARE

## 2019-11-13 VITALS
SYSTOLIC BLOOD PRESSURE: 131 MMHG | BODY MASS INDEX: 30.34 KG/M2 | RESPIRATION RATE: 16 BRPM | HEART RATE: 54 BPM | OXYGEN SATURATION: 96 % | TEMPERATURE: 98.1 F | DIASTOLIC BLOOD PRESSURE: 47 MMHG | WEIGHT: 188 LBS

## 2019-11-13 DIAGNOSIS — K20.90 ESOPHAGITIS: ICD-10-CM

## 2019-11-13 DIAGNOSIS — J20.8 VIRAL BRONCHITIS: Primary | ICD-10-CM

## 2019-11-13 LAB
ANION GAP SERPL CALC-SCNC: 2 MMOL/L (ref 5–15)
BASOPHILS # BLD: 0 K/UL (ref 0–0.1)
BASOPHILS NFR BLD: 0 % (ref 0–1)
BUN SERPL-MCNC: 24 MG/DL (ref 6–20)
BUN/CREAT SERPL: 24 (ref 12–20)
CALCIUM SERPL-MCNC: 9.4 MG/DL (ref 8.5–10.1)
CHLORIDE SERPL-SCNC: 107 MMOL/L (ref 97–108)
CO2 SERPL-SCNC: 30 MMOL/L (ref 21–32)
COMMENT, HOLDF: NORMAL
CREAT SERPL-MCNC: 0.98 MG/DL (ref 0.55–1.02)
DIFFERENTIAL METHOD BLD: ABNORMAL
EOSINOPHIL # BLD: 0.1 K/UL (ref 0–0.4)
EOSINOPHIL NFR BLD: 1 % (ref 0–7)
ERYTHROCYTE [DISTWIDTH] IN BLOOD BY AUTOMATED COUNT: 15 % (ref 11.5–14.5)
GLUCOSE SERPL-MCNC: 119 MG/DL (ref 65–100)
HCT VFR BLD AUTO: 32.8 % (ref 35–47)
HGB BLD-MCNC: 9.2 G/DL (ref 11.5–16)
IMM GRANULOCYTES # BLD AUTO: 0 K/UL (ref 0–0.04)
IMM GRANULOCYTES NFR BLD AUTO: 0 % (ref 0–0.5)
LYMPHOCYTES # BLD: 1.7 K/UL (ref 0.8–3.5)
LYMPHOCYTES NFR BLD: 15 % (ref 12–49)
MCH RBC QN AUTO: 24.2 PG (ref 26–34)
MCHC RBC AUTO-ENTMCNC: 28 G/DL (ref 30–36.5)
MCV RBC AUTO: 86.3 FL (ref 80–99)
MONOCYTES # BLD: 0.6 K/UL (ref 0–1)
MONOCYTES NFR BLD: 5 % (ref 5–13)
NEUTS SEG # BLD: 9 K/UL (ref 1.8–8)
NEUTS SEG NFR BLD: 79 % (ref 32–75)
NRBC # BLD: 0 K/UL (ref 0–0.01)
NRBC BLD-RTO: 0 PER 100 WBC
PLATELET # BLD AUTO: 379 K/UL (ref 150–400)
PMV BLD AUTO: 8.9 FL (ref 8.9–12.9)
POTASSIUM SERPL-SCNC: 4.7 MMOL/L (ref 3.5–5.1)
RBC # BLD AUTO: 3.8 M/UL (ref 3.8–5.2)
RBC MORPH BLD: ABNORMAL
RBC MORPH BLD: ABNORMAL
SAMPLES BEING HELD,HOLD: NORMAL
SODIUM SERPL-SCNC: 139 MMOL/L (ref 136–145)
WBC # BLD AUTO: 11.4 K/UL (ref 3.6–11)

## 2019-11-13 PROCEDURE — 74011250636 HC RX REV CODE- 250/636: Performed by: NURSE PRACTITIONER

## 2019-11-13 PROCEDURE — 36415 COLL VENOUS BLD VENIPUNCTURE: CPT

## 2019-11-13 PROCEDURE — 71260 CT THORAX DX C+: CPT

## 2019-11-13 PROCEDURE — 74011000258 HC RX REV CODE- 258: Performed by: RADIOLOGY

## 2019-11-13 PROCEDURE — 74011636320 HC RX REV CODE- 636/320: Performed by: RADIOLOGY

## 2019-11-13 PROCEDURE — 85025 COMPLETE CBC W/AUTO DIFF WBC: CPT

## 2019-11-13 PROCEDURE — 99282 EMERGENCY DEPT VISIT SF MDM: CPT

## 2019-11-13 PROCEDURE — 80048 BASIC METABOLIC PNL TOTAL CA: CPT

## 2019-11-13 PROCEDURE — 71046 X-RAY EXAM CHEST 2 VIEWS: CPT

## 2019-11-13 RX ORDER — SODIUM CHLORIDE 0.9 % (FLUSH) 0.9 %
10 SYRINGE (ML) INJECTION
Status: COMPLETED | OUTPATIENT
Start: 2019-11-13 | End: 2019-11-13

## 2019-11-13 RX ADMIN — Medication 10 ML: at 14:12

## 2019-11-13 RX ADMIN — SODIUM CHLORIDE 100 ML: 900 INJECTION, SOLUTION INTRAVENOUS at 14:12

## 2019-11-13 RX ADMIN — IOPAMIDOL 100 ML: 612 INJECTION, SOLUTION INTRAVENOUS at 14:12

## 2019-11-13 RX ADMIN — SODIUM CHLORIDE 1000 ML: 900 INJECTION, SOLUTION INTRAVENOUS at 13:25

## 2019-11-13 NOTE — ED TRIAGE NOTES
Pt arrives with cough x 1 month that has not responded to x3 PO antibiotics. +wet, productive cough with yellow/green sputum. Afebrile, 98.1.

## 2019-11-13 NOTE — DISCHARGE INSTRUCTIONS
Patient Education        Bronchitis: Care Instructions  Your Care Instructions    Bronchitis is inflammation of the bronchial tubes, which carry air to the lungs. The tubes swell and produce mucus, or phlegm. The mucus and inflamed bronchial tubes make you cough. You may have trouble breathing. Most cases of bronchitis are caused by viruses like those that cause colds. Antibiotics usually do not help and they may be harmful. Bronchitis usually develops rapidly and lasts about 2 to 3 weeks in otherwise healthy people. Follow-up care is a key part of your treatment and safety. Be sure to make and go to all appointments, and call your doctor if you are having problems. It's also a good idea to know your test results and keep a list of the medicines you take. How can you care for yourself at home? · Take all medicines exactly as prescribed. Call your doctor if you think you are having a problem with your medicine. · Get some extra rest.  · Take an over-the-counter pain medicine, such as acetaminophen (Tylenol), ibuprofen (Advil, Motrin), or naproxen (Aleve) to reduce fever and relieve body aches. Read and follow all instructions on the label. · Do not take two or more pain medicines at the same time unless the doctor told you to. Many pain medicines have acetaminophen, which is Tylenol. Too much acetaminophen (Tylenol) can be harmful. · Take an over-the-counter cough medicine that contains dextromethorphan to help quiet a dry, hacking cough so that you can sleep. Avoid cough medicines that have more than one active ingredient. Read and follow all instructions on the label. · Breathe moist air from a humidifier, hot shower, or sink filled with hot water. The heat and moisture will thin mucus so you can cough it out. · Do not smoke. Smoking can make bronchitis worse. If you need help quitting, talk to your doctor about stop-smoking programs and medicines.  These can increase your chances of quitting for good.  When should you call for help? Call 911 anytime you think you may need emergency care. For example, call if:    · You have severe trouble breathing.    Call your doctor now or seek immediate medical care if:    · You have new or worse trouble breathing.     · You cough up dark brown or bloody mucus (sputum).     · You have a new or higher fever.     · You have a new rash.    Watch closely for changes in your health, and be sure to contact your doctor if:    · You cough more deeply or more often, especially if you notice more mucus or a change in the color of your mucus.     · You are not getting better as expected. Where can you learn more? Go to http://jean claude-cris.info/. Enter H333 in the search box to learn more about \"Bronchitis: Care Instructions. \"  Current as of: June 9, 2019  Content Version: 12.2  © 7807-6404 [employer]. Care instructions adapted under license by [employer] (which disclaims liability or warranty for this information). If you have questions about a medical condition or this instruction, always ask your healthcare professional. [address] any warranty or liability for your use of this information.
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
## 2020-08-09 ENCOUNTER — HOSPITAL ENCOUNTER (EMERGENCY)
Age: 80
Discharge: HOME | End: 2020-08-09
Attending: EMERGENCY MEDICINE | Admitting: EMERGENCY MEDICINE
Payer: MEDICARE

## 2020-08-09 ENCOUNTER — APPOINTMENT (OUTPATIENT)
Dept: CT IMAGING | Age: 80
End: 2020-08-09
Attending: EMERGENCY MEDICINE
Payer: MEDICARE

## 2020-08-09 VITALS
TEMPERATURE: 98.3 F | DIASTOLIC BLOOD PRESSURE: 50 MMHG | OXYGEN SATURATION: 100 % | RESPIRATION RATE: 16 BRPM | SYSTOLIC BLOOD PRESSURE: 179 MMHG | HEART RATE: 50 BPM

## 2020-08-09 DIAGNOSIS — S09.90XA INJURY OF HEAD, INITIAL ENCOUNTER: Primary | ICD-10-CM

## 2020-08-09 PROCEDURE — 70450 CT HEAD/BRAIN W/O DYE: CPT

## 2020-08-09 PROCEDURE — 99284 EMERGENCY DEPT VISIT MOD MDM: CPT
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
## 2020-08-10 NOTE — DISCHARGE INSTRUCTIONS
Patient Education        Learning About a Closed Head Injury  What is a closed head injury? A closed head injury happens when your head gets hit hard. The strong force of the blow causes your brain to shake in your skull. This movement can cause the brain to bruise, swell, or tear. Sometimes nerves or blood vessels also get damaged. This can cause bleeding in or around the brain. A concussion is a type of closed head injury. What are the symptoms? If you have a mild concussion, you may have a mild headache or feel \"not quite right. \" These symptoms are common. They usually go away over a few days to 4 weeks. But sometimes after a concussion, you feel like you can't function as well as before the injury. And you have new symptoms. This is called postconcussive syndrome. You may:  · Find it harder to solve problems, think, concentrate, or remember. · Have headaches. · Have changes in your sleep patterns, such as not being able to sleep or sleeping all the time. · Have changes in your personality. · Not be interested in your usual activities. · Feel angry or anxious without a clear reason. · Lose your sense of taste or smell. · Be dizzy, lightheaded, or unsteady. It may be hard to stand or walk. How is a closed head injury treated? Any person who may have a concussion needs to see a doctor. Some people have to stay in the hospital to be watched. Others can go home safely. If you go home, follow your doctor's instructions. He or she will tell you if you need someone to watch you closely for the next 24 hours or longer. Rest is the best treatment. Get plenty of sleep at night. And try to rest during the day. · Avoid activities that are physically or mentally demanding. These include housework, exercise, and schoolwork. And don't play video games, send text messages, or use the computer. You may need to change your school or work schedule to be able to avoid these activities.   · Ask your doctor when it's okay to drive, ride a bike, or operate machinery. · Take an over-the-counter pain medicine, such as acetaminophen (Tylenol), ibuprofen (Advil, Motrin), or naproxen (Aleve). Be safe with medicines. Read and follow all instructions on the label. · Check with your doctor before you use any other medicines for pain. · Do not drink alcohol or use illegal drugs. They can slow recovery. They can also increase your risk of getting a second head injury. Follow-up care is a key part of your treatment and safety. Be sure to make and go to all appointments, and call your doctor if you are having problems. It's also a good idea to know your test results and keep a list of the medicines you take. Where can you learn more? Go to http://jean claude-cris.info/  Enter E235 in the search box to learn more about \"Learning About a Closed Head Injury. \"  Current as of: November 20, 2019               Content Version: 12.5  © 3540-0124 [employer]. Care instructions adapted under license by [employer] (which disclaims liability or warranty for this information). If you have questions about a medical condition or this instruction, always ask your healthcare professional. [address] any warranty or liability for your use of this information.

## 2020-08-10 NOTE — ED NOTES
Discharge instructions discussed with pt and family member by. Pt wheeled from department, pt condition stable.

## 2020-09-24 NOTE — PROGRESS NOTES
Please notify daughter: glucose 166: please review with Dr. Navid Ellison. Kidney function is stable. Inflammation marker normal. Taper prednisone as directed if tolerated. Vitamin D low normal. I would add an extra vitamin D3 1000 units daily for the next 6 months, then return to current calcium/D dosing. [Shortness Of Breath] : shortness of breath [Dizziness] : dizziness [Negative] : Heme/Lymph

## 2020-11-20 ENCOUNTER — HOSPITAL ENCOUNTER (INPATIENT)
Age: 80
LOS: 5 days | Discharge: HOSPICE | DRG: 689 | End: 2020-11-25
Attending: EMERGENCY MEDICINE | Admitting: HOSPITALIST
Payer: MEDICARE

## 2020-11-20 ENCOUNTER — APPOINTMENT (OUTPATIENT)
Dept: CT IMAGING | Age: 80
DRG: 689 | End: 2020-11-20
Attending: EMERGENCY MEDICINE
Payer: MEDICARE

## 2020-11-20 ENCOUNTER — APPOINTMENT (OUTPATIENT)
Dept: GENERAL RADIOLOGY | Age: 80
DRG: 689 | End: 2020-11-20
Attending: EMERGENCY MEDICINE
Payer: MEDICARE

## 2020-11-20 DIAGNOSIS — K56.699 OTHER SPECIFIED INTESTINAL OBSTRUCTION, UNSPECIFIED WHETHER PARTIAL OR COMPLETE (HCC): ICD-10-CM

## 2020-11-20 DIAGNOSIS — Z71.89 GOALS OF CARE, COUNSELING/DISCUSSION: ICD-10-CM

## 2020-11-20 DIAGNOSIS — R11.2 NAUSEA AND VOMITING, INTRACTABILITY OF VOMITING NOT SPECIFIED, UNSPECIFIED VOMITING TYPE: ICD-10-CM

## 2020-11-20 DIAGNOSIS — N39.0 URINARY TRACT INFECTION WITH HEMATURIA, SITE UNSPECIFIED: Primary | ICD-10-CM

## 2020-11-20 DIAGNOSIS — R31.9 URINARY TRACT INFECTION WITH HEMATURIA, SITE UNSPECIFIED: Primary | ICD-10-CM

## 2020-11-20 DIAGNOSIS — E88.09 HYPOALBUMINEMIA: ICD-10-CM

## 2020-11-20 DIAGNOSIS — F03.911 AGITATION DUE TO DEMENTIA: ICD-10-CM

## 2020-11-20 DIAGNOSIS — Z51.5 END OF LIFE CARE: ICD-10-CM

## 2020-11-20 DIAGNOSIS — I48.0 PAROXYSMAL ATRIAL FIBRILLATION (HCC): ICD-10-CM

## 2020-11-20 LAB
ALBUMIN SERPL-MCNC: 2.7 G/DL (ref 3.5–5)
ALBUMIN/GLOB SERPL: 0.7 {RATIO} (ref 1.1–2.2)
ALP SERPL-CCNC: 76 U/L (ref 45–117)
ALT SERPL-CCNC: 16 U/L (ref 12–78)
ANION GAP SERPL CALC-SCNC: 6 MMOL/L (ref 5–15)
APPEARANCE UR: ABNORMAL
AST SERPL-CCNC: 12 U/L (ref 15–37)
BACTERIA URNS QL MICRO: ABNORMAL /HPF
BASOPHILS # BLD: 0.1 K/UL (ref 0–0.1)
BASOPHILS NFR BLD: 1 % (ref 0–1)
BILIRUB SERPL-MCNC: 0.2 MG/DL (ref 0.2–1)
BILIRUB UR QL: NEGATIVE
BUN SERPL-MCNC: 29 MG/DL (ref 6–20)
BUN/CREAT SERPL: 23 (ref 12–20)
CALCIUM SERPL-MCNC: 9 MG/DL (ref 8.5–10.1)
CHLORIDE SERPL-SCNC: 104 MMOL/L (ref 97–108)
CO2 SERPL-SCNC: 28 MMOL/L (ref 21–32)
COLOR UR: ABNORMAL
COMMENT, HOLDF: NORMAL
CREAT SERPL-MCNC: 1.26 MG/DL (ref 0.55–1.02)
DIFFERENTIAL METHOD BLD: ABNORMAL
EOSINOPHIL # BLD: 0.1 K/UL (ref 0–0.4)
EOSINOPHIL NFR BLD: 1 % (ref 0–7)
EPITH CASTS URNS QL MICRO: ABNORMAL /LPF
ERYTHROCYTE [DISTWIDTH] IN BLOOD BY AUTOMATED COUNT: 16.4 % (ref 11.5–14.5)
EST. AVERAGE GLUCOSE BLD GHB EST-MCNC: 163 MG/DL
GLOBULIN SER CALC-MCNC: 4.1 G/DL (ref 2–4)
GLUCOSE BLD STRIP.AUTO-MCNC: 165 MG/DL (ref 65–100)
GLUCOSE BLD STRIP.AUTO-MCNC: 200 MG/DL (ref 65–100)
GLUCOSE SERPL-MCNC: 333 MG/DL (ref 65–100)
GLUCOSE UR STRIP.AUTO-MCNC: >1000 MG/DL
HBA1C MFR BLD: 7.3 % (ref 4–5.6)
HCT VFR BLD AUTO: 30.5 % (ref 35–47)
HGB BLD-MCNC: 8.5 G/DL (ref 11.5–16)
HGB UR QL STRIP: ABNORMAL
IMM GRANULOCYTES # BLD AUTO: 0.1 K/UL (ref 0–0.04)
IMM GRANULOCYTES NFR BLD AUTO: 1 % (ref 0–0.5)
KETONES UR QL STRIP.AUTO: ABNORMAL MG/DL
LACTATE SERPL-SCNC: 1 MMOL/L (ref 0.4–2)
LEUKOCYTE ESTERASE UR QL STRIP.AUTO: ABNORMAL
LYMPHOCYTES # BLD: 1.5 K/UL (ref 0.8–3.5)
LYMPHOCYTES NFR BLD: 11 % (ref 12–49)
MCH RBC QN AUTO: 22.7 PG (ref 26–34)
MCHC RBC AUTO-ENTMCNC: 27.9 G/DL (ref 30–36.5)
MCV RBC AUTO: 81.3 FL (ref 80–99)
MONOCYTES # BLD: 1.2 K/UL (ref 0–1)
MONOCYTES NFR BLD: 9 % (ref 5–13)
NEUTS SEG # BLD: 10.3 K/UL (ref 1.8–8)
NEUTS SEG NFR BLD: 77 % (ref 32–75)
NITRITE UR QL STRIP.AUTO: POSITIVE
NRBC # BLD: 0 K/UL (ref 0–0.01)
NRBC BLD-RTO: 0 PER 100 WBC
PH UR STRIP: 5 [PH] (ref 5–8)
PLATELET # BLD AUTO: 490 K/UL (ref 150–400)
PMV BLD AUTO: 9.2 FL (ref 8.9–12.9)
POTASSIUM SERPL-SCNC: 3.9 MMOL/L (ref 3.5–5.1)
PROT SERPL-MCNC: 6.8 G/DL (ref 6.4–8.2)
PROT UR STRIP-MCNC: ABNORMAL MG/DL
RBC # BLD AUTO: 3.75 M/UL (ref 3.8–5.2)
RBC #/AREA URNS HPF: ABNORMAL /HPF (ref 0–5)
RBC MORPH BLD: ABNORMAL
RBC MORPH BLD: ABNORMAL
SAMPLES BEING HELD,HOLD: NORMAL
SERVICE CMNT-IMP: ABNORMAL
SERVICE CMNT-IMP: ABNORMAL
SODIUM SERPL-SCNC: 138 MMOL/L (ref 136–145)
SP GR UR REFRACTOMETRY: 1.02 (ref 1–1.03)
UR CULT HOLD, URHOLD: NORMAL
UROBILINOGEN UR QL STRIP.AUTO: 0.2 EU/DL (ref 0.2–1)
WBC # BLD AUTO: 13.3 K/UL (ref 3.6–11)
WBC URNS QL MICRO: ABNORMAL /HPF (ref 0–4)

## 2020-11-20 PROCEDURE — 71045 X-RAY EXAM CHEST 1 VIEW: CPT

## 2020-11-20 PROCEDURE — 65660000000 HC RM CCU STEPDOWN

## 2020-11-20 PROCEDURE — 74176 CT ABD & PELVIS W/O CONTRAST: CPT

## 2020-11-20 PROCEDURE — 74011636637 HC RX REV CODE- 636/637: Performed by: HOSPITALIST

## 2020-11-20 PROCEDURE — 99285 EMERGENCY DEPT VISIT HI MDM: CPT

## 2020-11-20 PROCEDURE — 96367 TX/PROPH/DG ADDL SEQ IV INF: CPT

## 2020-11-20 PROCEDURE — 51798 US URINE CAPACITY MEASURE: CPT

## 2020-11-20 PROCEDURE — 74011000258 HC RX REV CODE- 258: Performed by: EMERGENCY MEDICINE

## 2020-11-20 PROCEDURE — 82962 GLUCOSE BLOOD TEST: CPT

## 2020-11-20 PROCEDURE — 96365 THER/PROPH/DIAG IV INF INIT: CPT

## 2020-11-20 PROCEDURE — C9113 INJ PANTOPRAZOLE SODIUM, VIA: HCPCS | Performed by: HOSPITALIST

## 2020-11-20 PROCEDURE — 80053 COMPREHEN METABOLIC PANEL: CPT

## 2020-11-20 PROCEDURE — 74011250637 HC RX REV CODE- 250/637: Performed by: HOSPITALIST

## 2020-11-20 PROCEDURE — 74011250636 HC RX REV CODE- 250/636: Performed by: HOSPITALIST

## 2020-11-20 PROCEDURE — 87086 URINE CULTURE/COLONY COUNT: CPT

## 2020-11-20 PROCEDURE — 83036 HEMOGLOBIN GLYCOSYLATED A1C: CPT

## 2020-11-20 PROCEDURE — 83605 ASSAY OF LACTIC ACID: CPT

## 2020-11-20 PROCEDURE — 36415 COLL VENOUS BLD VENIPUNCTURE: CPT

## 2020-11-20 PROCEDURE — 87186 SC STD MICRODIL/AGAR DIL: CPT

## 2020-11-20 PROCEDURE — 81001 URINALYSIS AUTO W/SCOPE: CPT

## 2020-11-20 PROCEDURE — 74011250636 HC RX REV CODE- 250/636: Performed by: EMERGENCY MEDICINE

## 2020-11-20 PROCEDURE — 87040 BLOOD CULTURE FOR BACTERIA: CPT

## 2020-11-20 PROCEDURE — 74011000250 HC RX REV CODE- 250: Performed by: HOSPITALIST

## 2020-11-20 PROCEDURE — 85025 COMPLETE CBC W/AUTO DIFF WBC: CPT

## 2020-11-20 PROCEDURE — 87077 CULTURE AEROBIC IDENTIFY: CPT

## 2020-11-20 RX ORDER — SODIUM CHLORIDE 0.9 % (FLUSH) 0.9 %
5-40 SYRINGE (ML) INJECTION AS NEEDED
Status: DISCONTINUED | OUTPATIENT
Start: 2020-11-20 | End: 2020-11-25 | Stop reason: HOSPADM

## 2020-11-20 RX ORDER — SENNOSIDES 8.6 MG/1
1 TABLET ORAL 2 TIMES DAILY
COMMUNITY

## 2020-11-20 RX ORDER — FERROUS SULFATE, DRIED 160(50) MG
1 TABLET, EXTENDED RELEASE ORAL 2 TIMES DAILY
Status: DISCONTINUED | OUTPATIENT
Start: 2020-11-20 | End: 2020-11-25 | Stop reason: HOSPADM

## 2020-11-20 RX ORDER — ACETAMINOPHEN 325 MG/1
650 TABLET ORAL
Status: DISCONTINUED | OUTPATIENT
Start: 2020-11-20 | End: 2020-11-25 | Stop reason: HOSPADM

## 2020-11-20 RX ORDER — SODIUM CHLORIDE 9 MG/ML
75 INJECTION, SOLUTION INTRAVENOUS CONTINUOUS
Status: DISCONTINUED | OUTPATIENT
Start: 2020-11-20 | End: 2020-11-23

## 2020-11-20 RX ORDER — LOPERAMIDE HCL 2 MG
2 TABLET ORAL
COMMUNITY

## 2020-11-20 RX ORDER — LANOLIN ALCOHOL/MO/W.PET/CERES
500 CREAM (GRAM) TOPICAL DAILY
Status: DISCONTINUED | OUTPATIENT
Start: 2020-11-21 | End: 2020-11-25 | Stop reason: HOSPADM

## 2020-11-20 RX ORDER — SODIUM CHLORIDE 0.9 % (FLUSH) 0.9 %
5-40 SYRINGE (ML) INJECTION EVERY 8 HOURS
Status: DISCONTINUED | OUTPATIENT
Start: 2020-11-20 | End: 2020-11-23

## 2020-11-20 RX ORDER — DEXTROSE 50 % IN WATER (D50W) INTRAVENOUS SYRINGE
12.5-25 AS NEEDED
Status: DISCONTINUED | OUTPATIENT
Start: 2020-11-20 | End: 2020-11-20 | Stop reason: CLARIF

## 2020-11-20 RX ORDER — MAGNESIUM SULFATE 100 %
4 CRYSTALS MISCELLANEOUS AS NEEDED
Status: DISCONTINUED | OUTPATIENT
Start: 2020-11-20 | End: 2020-11-25 | Stop reason: HOSPADM

## 2020-11-20 RX ORDER — PREDNISONE 5 MG/1
5 TABLET ORAL
Status: DISCONTINUED | OUTPATIENT
Start: 2020-11-21 | End: 2020-11-25 | Stop reason: HOSPADM

## 2020-11-20 RX ORDER — ONDANSETRON 2 MG/ML
4 INJECTION INTRAMUSCULAR; INTRAVENOUS
Status: DISCONTINUED | OUTPATIENT
Start: 2020-11-20 | End: 2020-11-25 | Stop reason: HOSPADM

## 2020-11-20 RX ORDER — HYDRALAZINE HYDROCHLORIDE 20 MG/ML
10 INJECTION INTRAMUSCULAR; INTRAVENOUS
Status: DISCONTINUED | OUTPATIENT
Start: 2020-11-20 | End: 2020-11-25 | Stop reason: HOSPADM

## 2020-11-20 RX ORDER — QUETIAPINE FUMARATE 25 MG/1
25 TABLET, FILM COATED ORAL
Status: DISCONTINUED | OUTPATIENT
Start: 2020-11-20 | End: 2020-11-25 | Stop reason: HOSPADM

## 2020-11-20 RX ORDER — INSULIN LISPRO 100 [IU]/ML
INJECTION, SOLUTION INTRAVENOUS; SUBCUTANEOUS EVERY 6 HOURS
Status: DISCONTINUED | OUTPATIENT
Start: 2020-11-20 | End: 2020-11-25 | Stop reason: HOSPADM

## 2020-11-20 RX ORDER — PREDNISONE 5 MG/1
5 TABLET ORAL DAILY
COMMUNITY

## 2020-11-20 RX ORDER — DEXTROMETHORPHAN POLISTIREX 30 MG/5 ML
SUSPENSION, EXTENDED RELEASE 12 HR ORAL AS NEEDED
COMMUNITY

## 2020-11-20 RX ORDER — ROSUVASTATIN CALCIUM 10 MG/1
10 TABLET, COATED ORAL
COMMUNITY

## 2020-11-20 RX ORDER — CLOPIDOGREL BISULFATE 75 MG/1
75 TABLET ORAL DAILY
COMMUNITY

## 2020-11-20 RX ORDER — DONEPEZIL HYDROCHLORIDE 10 MG/1
10 TABLET, FILM COATED ORAL
COMMUNITY

## 2020-11-20 RX ORDER — INSULIN GLARGINE 100 [IU]/ML
12 INJECTION, SOLUTION SUBCUTANEOUS
Status: DISCONTINUED | OUTPATIENT
Start: 2020-11-20 | End: 2020-11-23

## 2020-11-20 RX ORDER — BENZONATATE 100 MG/1
100 CAPSULE ORAL
COMMUNITY

## 2020-11-20 RX ORDER — NYSTATIN 100000 U/G
CREAM TOPICAL 2 TIMES DAILY
COMMUNITY

## 2020-11-20 RX ORDER — LORATADINE 10 MG/1
10 TABLET ORAL
Status: DISCONTINUED | OUTPATIENT
Start: 2020-11-20 | End: 2020-11-25 | Stop reason: HOSPADM

## 2020-11-20 RX ADMIN — SODIUM CHLORIDE 1000 ML: 900 INJECTION, SOLUTION INTRAVENOUS at 15:09

## 2020-11-20 RX ADMIN — ACETAMINOPHEN 650 MG: 325 TABLET ORAL at 19:02

## 2020-11-20 RX ADMIN — PROMETHAZINE HYDROCHLORIDE 12.5 MG: 25 INJECTION INTRAMUSCULAR; INTRAVENOUS at 12:22

## 2020-11-20 RX ADMIN — INSULIN GLARGINE 12 UNITS: 100 INJECTION, SOLUTION SUBCUTANEOUS at 21:53

## 2020-11-20 RX ADMIN — CEFTRIAXONE SODIUM 1 G: 1 INJECTION, POWDER, FOR SOLUTION INTRAMUSCULAR; INTRAVENOUS at 15:08

## 2020-11-20 RX ADMIN — SODIUM CHLORIDE 40 MG: 9 INJECTION INTRAMUSCULAR; INTRAVENOUS; SUBCUTANEOUS at 21:53

## 2020-11-20 RX ADMIN — SODIUM CHLORIDE 10 ML: 9 INJECTION INTRAMUSCULAR; INTRAVENOUS; SUBCUTANEOUS at 19:09

## 2020-11-20 RX ADMIN — Medication 1 TABLET: at 21:52

## 2020-11-20 RX ADMIN — SODIUM CHLORIDE 100 ML/HR: 900 INJECTION, SOLUTION INTRAVENOUS at 15:08

## 2020-11-20 RX ADMIN — SODIUM CHLORIDE 1000 ML: 900 INJECTION, SOLUTION INTRAVENOUS at 12:12

## 2020-11-20 RX ADMIN — SODIUM CHLORIDE 10 ML: 9 INJECTION INTRAMUSCULAR; INTRAVENOUS; SUBCUTANEOUS at 21:53

## 2020-11-20 RX ADMIN — INSULIN LISPRO 3 UNITS: 100 INJECTION, SOLUTION INTRAVENOUS; SUBCUTANEOUS at 19:02

## 2020-11-20 NOTE — H&P
[address]  HISTORY AND PHYSICAL    Name:  Romi Fisher  MR#:  [id number]  :  1940  ACCOUNT #:  [de-identified]  ADMIT DATE:  2020      PRIMARY CARE PROVIDER:  Kenyon Stauffer MD    SOURCE OF INFORMATION:  Daughter at the bedside and from her medical record. CHIEF COMPLAINT:  Vomiting and increasing confusion since this morning. HISTORY OF PRESENT ILLNESS:  This is an 61-year-old  female with past medical history significant for Alzheimer's dementia, polymyalgia rheumatica, history of stroke 3 years ago, hypertension, sinus bradycardia, and type 2 diabetes mellitus, who is brought to [facility] Emergency Department from [facility] after the patient experienced vomiting and increasing confusion. The patient with Alzheimer's dementia and poor historian. Most of the information is obtained from her daughter at the bedside. She stated that she has nausea and threw up multiple times since this morning associated and holding her hand on her abdomen. She has also multiple loose stool bowel movement. Her daughter reported that the patient has been slow for the last few days. On baseline, the patient engaged in some conversation and ambulates with a walker. No hematemesis, hematochezia, fever, chills, sweating, cough, contact with COVID-19, denied left-sided chest pain, palpitation or breathing difficulties. In ER, her vital sign was 164/63, pulse 61, temperature 98.4, saturation of oxygen 98% on room air. Urinalysis shows urinary tract infection. Urine culture and blood culture were sent. Received IV ceftriaxone and normal saline. CT scan of the abdomen and pelvis was done, showed colon obstruction, possibly due to neoplasia. Surgical Service is consulted. The patient referred to hospitalist service for further evaluation and admission. REVIEW OF SYSTEMS:  Pertinent positive findings mentioned in HPI.   All systems reviewed, no any other positive finding. PAST MEDICAL HISTORY:  1. Alzheimer's dementia. 2.  Hypertension. 3.  Type 2 diabetes mellitus. 4.  Polymyalgia rheumatica. 5.  Hypercholesterolemia. 6.  History of stroke 3 years ago. 7.  Osteopenia. 8.  Sinus bradycardia. PRIOR TO ADMISSION MEDICATIONS:  Include:  1. Lactobacillus acidophilus 1 capsule p.o. daily. 2.  Calcium cholecalciferol 1 tablet p.o. b.i.d.  3.  Metamucil one pack p.o. two times daily. 4.  Claritin 10 mg p.o. daily. 5.  Vitamin B12 1 tablet p.o. daily. 6.  Flonase 2 sprays in both nostrils daily. 7.  Nystatin to affected area. 8.  Prednisone 5 mg p.o. daily. ALLERGIES:  NO KNOWN DRUG ALLERGIES. SOCIAL HISTORY:  The patient is from [facility]. Ambulates with a walker. No tobacco or alcohol abuse. Code status:  DNR. FAMILY HISTORY:  Mother, history of coronary artery disease. Father, history of emphysema and asthma. Sister, history of cancer. PHYSICAL EXAMINATION:  VITAL SIGNS:  Blood pressure 181/56, pulse 63, temperature 97.2, respiratory rate 18, saturation of oxygen 96% on room air. GENERAL APPEARANCE:  The patient is alert, cooperative, not in acute respiratory distress. She appears her stated age. HEENT:  Pink conjunctivae. Anicteric sclerae. Moist tongue and buccal mucosa. LUNGS:  Clear to auscultation bilaterally. CHEST WALL:  No tenderness or deformity. CARDIOVASCULAR SYSTEM:  Regular rate and rhythm. S1 and S2 normal.  No murmur or gallop. ABDOMEN:  Slightly distended, soft, nontender. No mass or organomegaly. Normoactive bowel sounds. EXTREMITIES:  No cyanosis or edema. SKIN:  No rash or lesion. CENTRAL NERVOUS SYSTEM:  The patient is conscious and alert. Nonverbal.  Follows simple command. Moves all extremities. LABORATORY DATA:  White blood cell count 13.3, hemoglobin 8.5, MCV 81.3, platelet count 235.   Urinalysis:  White blood cell count , leukocyte esterase small, nitrite positive, bacteria 3+. Chemistry:  Sodium 138, potassium 3.9, chloride 104, CO2 of 28, anion gap 6, glucose 333. Creatinine 1.26. BUN and creatinine ratio 23, calcium 9, total bilirubin 0.2, total protein 6.8, albumin 2.7, ALT 16, AST 12, alkaline phosphatase 76. Lactic acid 1.0. Chest x-ray result pending. CT scan of the abdomen and pelvis, colon obstruction, possibly due to neoplasia. ASSESSMENT:  1. Urinary tract infection present on admission. 2.  Acute metabolic encephalopathy with history of dementia. 3.  Colonic obstruction, possibly due to neoplasia. 4.  Anemia of chronic disease. 5.  Hypertension, poorly controlled. 6.  Type 2 diabetes mellitus with hyperglycemia. 7.  History of polymyalgia rheumatica. 8.  History of stroke. 9.  Dementia. PLAN:  1. Urinary tract infection present on admission. Admit the patient to telemetry floor, ceftriaxone 1 g IV q.24 hours, normal saline line at 100 mL/hour. Follow up urine culture and blood culture. 2.  Acute metabolic encephalopathy, likely due to urinary tract infection. The patient is conscious and alert, nonverbal.  Per her daughter, more confused. Continue neuro check and supportive care. The patient is high risk for delirium. will add Seroquel p.r.n.  3.  Colonic obstruction, possibly due to focal sigmoid neoplasia. will keep the patient n.p.o. The patient has multiple loose stool bowel movement. Surgical Service is consulted. Will consult also GI  4. Anemia of chronic disease. Hemoglobin is 8.5. No evidence of bleeding. Monitor H and H.  5.  Hypertension, poorly controlled. Not on home antihypertensive medication. We will give her hydralazine IV q.6 p.r.n. for blood pressure greater than 190/100.  6. Type 2 diabetes mellitus with hyperglycemia.  will check hemoglobin A1c. Fingerstick glucose 333. Lantus 10 units and sliding scale and monitor fingerstick glucose.   7.  History of polymyalgia rheumatica, stable. Continue home medication, prednisone 5 mg daily. 8.  History of stroke, stable.  hold her home Plavix. Continue statin. 9.  History of dementia with worsening confusion. continue her home medication, donepezil 10 mg. Continue neuro check and supportive care. Deep venous thrombosis prophylaxis. Sequential compressive device. FUNCTIONAL STATUS PRIOR TO ADMISSION:  The patient ambulates with a walker. The patient is high risk for decompensation. Patient DNR. Family aware about her clinical condition, care plan discussed and questions answered.         MD JARED [email or web address]  D:  11/20/2020 16:28  T:  11/20/2020 17:41  JOB #:  [id number]
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
## 2020-11-20 NOTE — ED NOTES
Pt assisted to bedpan, was unable to urinate. Then assisted to bedside commode. Reporting urgency and frequency. Cloretta Deis now in use. Awaiting admission.

## 2020-11-20 NOTE — ED TRIAGE NOTES
Patient comes to the ER via EMS from [facility] because she vomited x2 at breakfast. Hx of dementia

## 2020-11-20 NOTE — ED NOTES
Pt's daughter at bedside. NS and phenergan running. Pt in NAD. Straight cath completed for urine collection, pt changed into clean brief. Awaiting CT scan and further dispo.
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
## 2020-11-20 NOTE — ROUTINE PROCESS
TRANSFER - OUT REPORT: 
 
Verbal report given to 2000 Mehran Hodge on [facility]  being transferred to [address] [place] [address] 27  for routine progression of care Report consisted of patients Situation, Background, Assessment and  
Recommendations(SBAR). Information from the following report(s) SBAR, Kardex, ED Summary, Intake/Output, MAR and Recent Results was reviewed with the receiving nurse. Lines:  
Peripheral IV 11/20/20 Left Antecubital (Active) Site Assessment Clean, dry, & intact 11/20/20 1158 Phlebitis Assessment 0 11/20/20 1158 Infiltration Assessment 0 11/20/20 1158 Dressing Status Clean, dry, & intact 11/20/20 1158 Peripheral IV 11/20/20 Right Forearm (Active) Opportunity for questions and clarification was provided. Patient transported with: 
 [employer]
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
## 2020-11-21 LAB
ALBUMIN SERPL-MCNC: 2.4 G/DL (ref 3.5–5)
ALBUMIN/GLOB SERPL: 0.6 {RATIO} (ref 1.1–2.2)
ALP SERPL-CCNC: 73 U/L (ref 45–117)
ALT SERPL-CCNC: 11 U/L (ref 12–78)
ANION GAP SERPL CALC-SCNC: 9 MMOL/L (ref 5–15)
AST SERPL-CCNC: 11 U/L (ref 15–37)
BILIRUB SERPL-MCNC: 0.2 MG/DL (ref 0.2–1)
BUN SERPL-MCNC: 25 MG/DL (ref 6–20)
BUN/CREAT SERPL: 27 (ref 12–20)
CALCIUM SERPL-MCNC: 8.4 MG/DL (ref 8.5–10.1)
CHLORIDE SERPL-SCNC: 112 MMOL/L (ref 97–108)
CO2 SERPL-SCNC: 21 MMOL/L (ref 21–32)
CREAT SERPL-MCNC: 0.93 MG/DL (ref 0.55–1.02)
ERYTHROCYTE [DISTWIDTH] IN BLOOD BY AUTOMATED COUNT: 16.3 % (ref 11.5–14.5)
GLOBULIN SER CALC-MCNC: 3.8 G/DL (ref 2–4)
GLUCOSE BLD STRIP.AUTO-MCNC: 129 MG/DL (ref 65–100)
GLUCOSE BLD STRIP.AUTO-MCNC: 130 MG/DL (ref 65–100)
GLUCOSE BLD STRIP.AUTO-MCNC: 145 MG/DL (ref 65–100)
GLUCOSE BLD STRIP.AUTO-MCNC: 146 MG/DL (ref 65–100)
GLUCOSE BLD STRIP.AUTO-MCNC: 159 MG/DL (ref 65–100)
GLUCOSE BLD STRIP.AUTO-MCNC: 176 MG/DL (ref 65–100)
GLUCOSE SERPL-MCNC: 143 MG/DL (ref 65–100)
HCT VFR BLD AUTO: 31.2 % (ref 35–47)
HGB BLD-MCNC: 8.6 G/DL (ref 11.5–16)
MCH RBC QN AUTO: 22.6 PG (ref 26–34)
MCHC RBC AUTO-ENTMCNC: 27.6 G/DL (ref 30–36.5)
MCV RBC AUTO: 82.1 FL (ref 80–99)
NRBC # BLD: 0 K/UL (ref 0–0.01)
NRBC BLD-RTO: 0 PER 100 WBC
PLATELET # BLD AUTO: 508 K/UL (ref 150–400)
PMV BLD AUTO: 9.1 FL (ref 8.9–12.9)
POTASSIUM SERPL-SCNC: 3.1 MMOL/L (ref 3.5–5.1)
PROT SERPL-MCNC: 6.2 G/DL (ref 6.4–8.2)
RBC # BLD AUTO: 3.8 M/UL (ref 3.8–5.2)
SERVICE CMNT-IMP: ABNORMAL
SODIUM SERPL-SCNC: 142 MMOL/L (ref 136–145)
WBC # BLD AUTO: 15.2 K/UL (ref 3.6–11)

## 2020-11-21 PROCEDURE — 82962 GLUCOSE BLOOD TEST: CPT

## 2020-11-21 PROCEDURE — 85027 COMPLETE CBC AUTOMATED: CPT

## 2020-11-21 PROCEDURE — 80053 COMPREHEN METABOLIC PANEL: CPT

## 2020-11-21 PROCEDURE — 36415 COLL VENOUS BLD VENIPUNCTURE: CPT

## 2020-11-21 PROCEDURE — C9113 INJ PANTOPRAZOLE SODIUM, VIA: HCPCS | Performed by: HOSPITALIST

## 2020-11-21 PROCEDURE — 74011250636 HC RX REV CODE- 250/636: Performed by: HOSPITALIST

## 2020-11-21 PROCEDURE — 74011000258 HC RX REV CODE- 258: Performed by: SURGERY

## 2020-11-21 PROCEDURE — 74011636637 HC RX REV CODE- 636/637: Performed by: HOSPITALIST

## 2020-11-21 PROCEDURE — 74011250636 HC RX REV CODE- 250/636: Performed by: SURGERY

## 2020-11-21 PROCEDURE — 74011250637 HC RX REV CODE- 250/637: Performed by: HOSPITALIST

## 2020-11-21 PROCEDURE — 65660000000 HC RM CCU STEPDOWN

## 2020-11-21 PROCEDURE — 74011000250 HC RX REV CODE- 250: Performed by: HOSPITALIST

## 2020-11-21 PROCEDURE — 74011000258 HC RX REV CODE- 258: Performed by: HOSPITALIST

## 2020-11-21 RX ORDER — POTASSIUM CHLORIDE 7.45 MG/ML
10 INJECTION INTRAVENOUS
Status: COMPLETED | OUTPATIENT
Start: 2020-11-21 | End: 2020-11-21

## 2020-11-21 RX ORDER — DONEPEZIL HYDROCHLORIDE 10 MG/1
10 TABLET, FILM COATED ORAL
Status: DISCONTINUED | OUTPATIENT
Start: 2020-11-21 | End: 2020-11-25 | Stop reason: HOSPADM

## 2020-11-21 RX ADMIN — POTASSIUM CHLORIDE 10 MEQ: 7.46 INJECTION, SOLUTION INTRAVENOUS at 08:16

## 2020-11-21 RX ADMIN — SODIUM CHLORIDE 40 MG: 9 INJECTION INTRAMUSCULAR; INTRAVENOUS; SUBCUTANEOUS at 20:07

## 2020-11-21 RX ADMIN — PIPERACILLIN AND TAZOBACTAM 3.38 G: 3; .375 INJECTION, POWDER, LYOPHILIZED, FOR SOLUTION INTRAVENOUS at 16:58

## 2020-11-21 RX ADMIN — SODIUM CHLORIDE 5 ML: 9 INJECTION INTRAMUSCULAR; INTRAVENOUS; SUBCUTANEOUS at 22:00

## 2020-11-21 RX ADMIN — INSULIN LISPRO 2 UNITS: 100 INJECTION, SOLUTION INTRAVENOUS; SUBCUTANEOUS at 00:57

## 2020-11-21 RX ADMIN — QUETIAPINE FUMARATE 25 MG: 25 TABLET ORAL at 21:44

## 2020-11-21 RX ADMIN — SODIUM CHLORIDE 10 ML: 9 INJECTION INTRAMUSCULAR; INTRAVENOUS; SUBCUTANEOUS at 14:54

## 2020-11-21 RX ADMIN — INSULIN LISPRO 2 UNITS: 100 INJECTION, SOLUTION INTRAVENOUS; SUBCUTANEOUS at 06:02

## 2020-11-21 RX ADMIN — SODIUM CHLORIDE 100 ML/HR: 900 INJECTION, SOLUTION INTRAVENOUS at 00:57

## 2020-11-21 RX ADMIN — POTASSIUM CHLORIDE 10 MEQ: 7.46 INJECTION, SOLUTION INTRAVENOUS at 09:19

## 2020-11-21 RX ADMIN — CEFTRIAXONE SODIUM 1 G: 1 INJECTION, POWDER, FOR SOLUTION INTRAMUSCULAR; INTRAVENOUS at 14:50

## 2020-11-21 RX ADMIN — SODIUM CHLORIDE 40 MG: 9 INJECTION INTRAMUSCULAR; INTRAVENOUS; SUBCUTANEOUS at 08:18

## 2020-11-21 RX ADMIN — PIPERACILLIN AND TAZOBACTAM 3.38 G: 3; .375 INJECTION, POWDER, LYOPHILIZED, FOR SOLUTION INTRAVENOUS at 23:28

## 2020-11-21 RX ADMIN — DONEPEZIL HYDROCHLORIDE 10 MG: 10 TABLET, FILM COATED ORAL at 21:44

## 2020-11-21 NOTE — PROGRESS NOTES
Bedside and Verbal shift change report given to [address] [place] [address] (oncoming nurse) by Stefany Rachel (offgoing nurse). Report included the following information SBAR, Kardex, MAR, Cardiac Rhythm NSR, Quality Measures and Dual Neuro Assessment.
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
## 2020-11-21 NOTE — PROGRESS NOTES
Chart accessed to assist primary nurse. Entered room to assist daughter, patient trying to get up out of bed. Very confused, stating she needs to urinate. Does not seem safe to get out of bed at this time, purewick in place. Calmed patient, bladder scan revealed 10mL of urine in bladder. Redirected/distracted patient, now sitting in bed without distress. Daughter at bedside. Reviewed camera consent with daughter. Patient: Tanika Cordova MRN: [id number]    Age: 78 y.o. YOB: 1940 Room/Bed: [facility]/   Consent for video monitoring obtained after the below has been explained to the patient/family on 11/20/2020:  1. They are being monitored continuously in an effort to promote their safety. 2. That there may be times when the camera will be discontinued to provide care to me to ensure my dignity, such as during bathing or any activity that risks me being exposed. 3. They have the right to opt out of having this surveillance monitoring at any time. 4. It has been explained to the patient/family and they understand that the hospital does not maintain any recording of this surveillance monitoring.     Seamus Hartman RN

## 2020-11-21 NOTE — PROGRESS NOTES
[address] [facility] Adult  Hospitalist Group                                                                                          Hospitalist Progress Note  Didi Brito MD  Answering service: [phone] from in house phone        Date of Service:  2020  NAME:  Sara Jane  :  1940  MRN:  [id number]      Admission Summary: This is an 77-year-old  female with past medical history significant for Alzheimer's dementia, polymyalgia rheumatica, history of stroke 3 years ago, hypertension, sinus bradycardia, and type 2 diabetes mellitus, who is brought to [address] [place] [address] Emergency Department from [facility] after the patient experienced vomiting and increasing confusion. The patient with Alzheimer's dementia and poor historian. Most of the information is obtained from her daughter at the bedside. She stated that she has nausea and threw up multiple times since this morning associated and holding her hand on her abdomen. She has also multiple loose stool bowel movement. Her daughter reported that the patient has been slow for the last few days. On baseline, the patient engaged in some conversation and ambulates with a walker. No hematemesis, hematochezia, fever, chills, sweating, cough, contact with COVID-19, denied left-sided chest pain, palpitation or breathing difficulties. Interval history / Subjective:     Patient with dementia, poor historian, she asking for water, no chest pain, shortness of breath. No bowel movement overnight per her nurse     Assessment & Plan:     UTI POA  -continue ceftriaxone and IVF  -has leukocytosis, afebrile   -follow up urine and blood cx    Acute metabolic encephalopathy with history of dementia.   -likely due to UTI  -patient is conscious and alert, answer simple questions and follows simple command  -continue neuro check and supportive care    Colonic obstruction, possibly due to possible neoplasia.  -NPO, continue normal saline   -seen by surgical service  -GI is consulted    Anemia of chronic disease.  -stable, no evidence of bleeding  -monitor H/H    HTN poorly controlled. -BP improving, not on meds, on prn iv hydralazine, monitor BP    T2DM with hyperglycemia. -A1c 7.3  -finger stick glucose 145-200 overnight, 145 this morning  -patient NPO, hold lantus, monitor finger stick glucose on sliding scale    Hx of polymyalgia rheumatica. -stable, continue prednisone     Hx of stroke. -stable, plavix is held for now    Dementia. -stable  -conscious and alert, oriented to self  -continue home aricept q hs        Code status: DNR  DVT prophylaxis:SCD    Care Plan discussed with: Patient/Family, Nurse and   Anticipated Disposition:  PT, OT, RN  Anticipated Discharge: Greater than 48 hours     Hospital Problems  Date Reviewed: 10/9/2017          Codes Class Noted POA    UTI (urinary tract infection) ICD-10-CM: N39.0  ICD-9-CM: 599.0  11/20/2020 Unknown                 Vital Signs:    Last 24hrs VS reviewed since prior progress note. Most recent are:  Visit Vitals  BP (!) 165/62   Pulse 67   Temp 97.5 °F (36.4 °C)   Resp 22   Wt 84.8 kg (186 lb 14.4 oz)   SpO2 98%   BMI 30.17 kg/m²         Intake/Output Summary (Last 24 hours) at 11/21/2020 1002  Last data filed at 11/21/2020 0057  Gross per 24 hour   Intake    Output 325 ml   Net -325 ml        Physical Examination:     I had a face to face encounter with this patient and independently examined them on 11/21/2020 as outlined below:          Constitutional:  No acute distress, cooperative, pleasant    ENT:  Oral mucosa moist, oropharynx benign. Resp:  CTA bilaterally. No wheezing/rhonchi/rales. No accessory muscle use   CV:  Regular rhythm, normal rate, no murmurs, gallops, rubs    GI:  Soft, distended, non tender.  normoactive bowel sounds, no hepatosplenomegaly     Musculoskeletal:  No edema     Neurologic:  Conscious and alert, oriented to self, follows simple command, moves all extremities, CN II-XII reviewed     Skin:  Good turgor, no rashes or ulcers       Data Review:    Review and/or order of clinical lab test  Review and/or order of tests in the radiology section of CPT  Review and/or order of tests in the medicine section of CPT      Labs:     Recent Labs     11/21/20 0100 11/20/20  1056   WBC 15.2* 13.3*   HGB 8.6* 8.5*   HCT 31.2* 30.5*   * 490*     Recent Labs     11/21/20 0100 11/20/20  1056    138   K 3.1* 3.9   * 104   CO2 21 28   BUN 25* 29*   CREA 0.93 1.26*   * 333*   CA 8.4* 9.0     Recent Labs     11/21/20 0100 11/20/20  1056   ALT 11* 16   AP 73 76   TBILI 0.2 0.2   TP 6.2* 6.8   ALB 2.4* 2.7*   GLOB 3.8 4.1*     No results for input(s): INR, PTP, APTT, INREXT, INREXT in the last 72 hours. No results for input(s): FE, TIBC, PSAT, FERR in the last 72 hours. No results found for: FOL, RBCF   No results for input(s): PH, PCO2, PO2 in the last 72 hours. No results for input(s): CPK, CKNDX, TROIQ in the last 72 hours.     No lab exists for component: CPKMB  Lab Results   Component Value Date/Time    Cholesterol, total 181 01/30/2018 03:35 AM    HDL Cholesterol 72 01/30/2018 03:35 AM    LDL, calculated 94.6 01/30/2018 03:35 AM    Triglyceride 72 01/30/2018 03:35 AM    CHOL/HDL Ratio 2.5 01/30/2018 03:35 AM     Lab Results   Component Value Date/Time    Glucose (POC) 129 (H) 11/21/2020 09:05 AM    Glucose (POC) 145 (H) 11/21/2020 05:59 AM    Glucose (POC) 176 (H) 11/21/2020 12:42 AM    Glucose (POC) 165 (H) 11/20/2020 08:26 PM    Glucose (POC) 200 (H) 11/20/2020 05:46 PM     Lab Results   Component Value Date/Time    Color YELLOW/STRAW 11/20/2020 12:16 PM    Appearance CLOUDY (A) 11/20/2020 12:16 PM    Specific gravity 1.025 11/20/2020 12:16 PM    pH (UA) 5.0 11/20/2020 12:16 PM    Protein TRACE (A) 11/20/2020 12:16 PM    Glucose >1,000 (A) 11/20/2020 12:16 PM    Ketone TRACE (A) 11/20/2020 12:16 PM    Bilirubin Negative 11/20/2020 12:16 PM    Urobilinogen 0.2 11/20/2020 12:16 PM    Nitrites Positive (A) 11/20/2020 12:16 PM    Leukocyte Esterase SMALL (A) 11/20/2020 12:16 PM    Epithelial cells FEW 11/20/2020 12:16 PM    Bacteria 3+ (A) 11/20/2020 12:16 PM    WBC  11/20/2020 12:16 PM    RBC 0-5 11/20/2020 12:16 PM         Medications Reviewed:     Current Facility-Administered Medications   Medication Dose Route Frequency    potassium chloride 10 mEq in 100 ml IVPB  10 mEq IntraVENous Q1H    donepeziL (ARICEPT) tablet 10 mg  10 mg Oral QHS    0.9% sodium chloride infusion  75 mL/hr IntraVENous CONTINUOUS    acetaminophen (TYLENOL) tablet 650 mg  650 mg Oral Q6H PRN    calcium-vitamin D (OS-GENOVEVA +D3) 500 mg-200 unit per tablet 1 Tab  1 Tab Oral BID    cyanocobalamin (VITAMIN B12) tablet 500 mcg  500 mcg Oral DAILY    lactobac ac& pc-s.therm-b.anim (TEJ Q/RISAQUAD)  1 Cap Oral DAILY    loratadine (CLARITIN) tablet 10 mg  10 mg Oral DAILY PRN    predniSONE (DELTASONE) tablet 5 mg  5 mg Oral DAILY WITH BREAKFAST    sodium chloride (NS) flush 5-40 mL  5-40 mL IntraVENous Q8H    sodium chloride (NS) flush 5-40 mL  5-40 mL IntraVENous PRN    cefTRIAXone (ROCEPHIN) 1 g in 0.9% sodium chloride (MBP/ADV) 50 mL MBP  1 g IntraVENous Q24H    insulin lispro (HUMALOG) injection   SubCUTAneous Q6H    glucose chewable tablet 16 g  4 Tab Oral PRN    glucagon (GLUCAGEN) injection 1 mg  1 mg IntraMUSCular PRN    hydrALAZINE (APRESOLINE) 20 mg/mL injection 10 mg  10 mg IntraVENous Q6H PRN    pantoprazole (PROTONIX) 40 mg in 0.9% sodium chloride 10 mL injection  40 mg IntraVENous Q12H    ondansetron (ZOFRAN) injection 4 mg  4 mg IntraVENous Q6H PRN    [Held by provider] insulin glargine (LANTUS) injection 12 Units  12 Units SubCUTAneous QHS    QUEtiapine (SEROquel) tablet 25 mg  25 mg Oral QHS PRN    dextrose 10 % infusion 125-250 mL  125-250 mL IntraVENous PRN ______________________________________________________________________  EXPECTED LENGTH OF STAY: - - -  ACTUAL LENGTH OF STAY:          1                 Sherwin Lala MD

## 2020-11-21 NOTE — PROGRESS NOTES
Progress Note    Patient: Crow Corey MRN: [id number]  SSN: [id number]    YOB: 1940  Age: 78 y.o. Sex: female      Admit Date: 2020    * No surgery found *    Procedure:      Subjective:     Patient wants to drink- She has several BM today per her daughter. Objective:     Visit Vitals  BP (!) 140/60 (BP 1 Location: Left arm, BP Patient Position: At rest)   Pulse 77   Temp 98.8 °F (37.1 °C)   Resp 19   Wt 84.8 kg (186 lb 14.4 oz)   SpO2 96%   BMI 30.17 kg/m²       Temp (24hrs), Av °F (36.7 °C), Min:97.3 °F (36.3 °C), Max:98.8 °F (37.1 °C)      Physical Exam:    GENERAL: alert, cooperative, no distress, appears stated age, LUNG: clear to auscultation bilaterally, HEART: regular rate and rhythm, ABDOMEN: soft non tnedner non distended. Data Review: images and reports reviewed    Lab Review: All lab results for the last 24 hours reviewed.   Recent Results (from the past 24 hour(s))   GLUCOSE, POC    Collection Time: 20  5:46 PM   Result Value Ref Range    Glucose (POC) 200 (H) 65 - 100 mg/dL    Performed by Will De La Cruz    GLUCOSE, POC    Collection Time: 20  8:26 PM   Result Value Ref Range    Glucose (POC) 165 (H) 65 - 100 mg/dL    Performed by [address] [place] [address], POC    Collection Time: 20 12:42 AM   Result Value Ref Range    Glucose (POC) 176 (H) 65 - 100 mg/dL    Performed by [address] [place] [address], COMPREHENSIVE    Collection Time: 20  1:00 AM   Result Value Ref Range    Sodium 142 136 - 145 mmol/L    Potassium 3.1 (L) 3.5 - 5.1 mmol/L    Chloride 112 (H) 97 - 108 mmol/L    CO2 21 21 - 32 mmol/L    Anion gap 9 5 - 15 mmol/L    Glucose 143 (H) 65 - 100 mg/dL    BUN 25 (H) 6 - 20 MG/DL    Creatinine 0.93 0.55 - 1.02 MG/DL    BUN/Creatinine ratio 27 (H) 12 - 20      GFR est AA >60 >60 ml/min/1.73m2    GFR est non-AA 58 (L) >60 ml/min/1.73m2    Calcium 8.4 (L) 8.5 - 10.1 MG/DL    Bilirubin, total 0.2 0.2 - 1.0 MG/DL    ALT (SGPT) 11 (L) 12 - 78 U/L    AST (SGOT) 11 (L) 15 - 37 U/L    Alk.  phosphatase 73 45 - 117 U/L    Protein, total 6.2 (L) 6.4 - 8.2 g/dL    Albumin 2.4 (L) 3.5 - 5.0 g/dL    Globulin 3.8 2.0 - 4.0 g/dL    A-G Ratio 0.6 (L) 1.1 - 2.2     CBC W/O DIFF    Collection Time: 11/21/20  1:00 AM   Result Value Ref Range    WBC 15.2 (H) 3.6 - 11.0 K/uL    RBC 3.80 3.80 - 5.20 M/uL    HGB 8.6 (L) 11.5 - 16.0 g/dL    HCT 31.2 (L) 35.0 - 47.0 %    MCV 82.1 80.0 - 99.0 FL    MCH 22.6 (L) 26.0 - 34.0 PG    MCHC 27.6 (L) 30.0 - 36.5 g/dL    RDW 16.3 (H) 11.5 - 14.5 %    PLATELET 613 (H) 854 - 400 K/uL    MPV 9.1 8.9 - 12.9 FL    NRBC 0.0 0  WBC    ABSOLUTE NRBC 0.00 0.00 - 0.01 K/uL   GLUCOSE, POC    Collection Time: 11/21/20  5:59 AM   Result Value Ref Range    Glucose (POC) 145 (H) 65 - 100 mg/dL    Performed by [address] [place] [address], POC    Collection Time: 11/21/20  9:05 AM   Result Value Ref Range    Glucose (POC) 129 (H) 65 - 100 mg/dL    Performed by [address] [place] [address], POC    Collection Time: 11/21/20 11:49 AM   Result Value Ref Range    Glucose (POC) 130 (H) 65 - 100 mg/dL    Performed by Elliot DE LUNA(CON)        Assessment:     Hospital Problems  Date Reviewed: 10/9/2017          Codes Class Noted POA    UTI (urinary tract infection) ICD-10-CM: N39.0  ICD-9-CM: 599.0  11/20/2020 Unknown              Plan/Recommendations/Medical Decision Making:   WBC up a little today - will switch ABX to zosyn  May have a a few sips of liquids  Agree with plan for repeat CT tomorrow or Monday/

## 2020-11-21 NOTE — PROGRESS NOTES
Problem: Falls - Risk of  Goal: *Absence of Falls  Description: Document Edmonia Morning Fall Risk and appropriate interventions in the flowsheet. Outcome: Progressing Towards Goal  Note: Fall Risk Interventions:  Mobility Interventions: Bed/chair exit alarm, Communicate number of staff needed for ambulation/transfer, OT consult for ADLs, Patient to call before getting OOB, PT Consult for mobility concerns    Mentation Interventions: Bed/chair exit alarm, Increase mobility, More frequent rounding, Reorient patient, Room close to nurse's station    Medication Interventions: Bed/chair exit alarm, Evaluate medications/consider consulting pharmacy, Patient to call before getting OOB, Teach patient to arise slowly    Elimination Interventions: Bed/chair exit alarm, Call light in reach, Patient to call for help with toileting needs, Stay With Me (per policy), Toilet paper/wipes in reach              Problem: Diabetes Self-Management  Goal: *Disease process and treatment process  Description: Define diabetes and identify own type of diabetes; list 3 options for treating diabetes. Outcome: Progressing Towards Goal  Goal: *Incorporating nutritional management into lifestyle  Description: Describe effect of type, amount and timing of food on blood glucose; list 3 methods for planning meals. Outcome: Progressing Towards Goal  Goal: *Incorporating physical activity into lifestyle  Description: State effect of exercise on blood glucose levels. Outcome: Progressing Towards Goal  Goal: *Developing strategies to promote health/change behavior  Description: Define the ABC's of diabetes; identify appropriate screenings, schedule and personal plan for screenings. Outcome: Progressing Towards Goal  Goal: *Using medications safely  Description: State effect of diabetes medications on diabetes; name diabetes medication taking, action and side effects.   Outcome: Progressing Towards Goal  Goal: *Monitoring blood glucose, interpreting and using results  Description: Identify recommended blood glucose targets  and personal targets. Outcome: Progressing Towards Goal  Goal: *Prevention, detection, treatment of acute complications  Description: List symptoms of hyper- and hypoglycemia; describe how to treat low blood sugar and actions for lowering  high blood glucose level. Outcome: Progressing Towards Goal  Goal: *Prevention, detection and treatment of chronic complications  Description: Define the natural course of diabetes and describe the relationship of blood glucose levels to long term complications of diabetes. Outcome: Progressing Towards Goal  Goal: *Developing strategies to address psychosocial issues  Description: Describe feelings about living with diabetes; identify support needed and support network  Outcome: Progressing Towards Goal     Problem: Pressure Injury - Risk of  Goal: *Prevention of pressure injury  Description: Document Elieser Scale and appropriate interventions in the flowsheet.   Outcome: Progressing Towards Goal  Note: Pressure Injury Interventions:  Sensory Interventions: Assess changes in LOC, Assess need for specialty bed, Avoid rigorous massage over bony prominences, Float heels, Keep linens dry and wrinkle-free, Maintain/enhance activity level, Minimize linen layers    Moisture Interventions: Absorbent underpads, Apply protective barrier, creams and emollients, Assess need for specialty bed, Limit adult briefs    Activity Interventions: Increase time out of bed, PT/OT evaluation, Pressure redistribution bed/mattress(bed type)    Mobility Interventions: Float heels, HOB 30 degrees or less, PT/OT evaluation, Pressure redistribution bed/mattress (bed type)    Nutrition Interventions: Document food/fluid/supplement intake, Discuss nutritional consult with provider    Friction and Shear Interventions: HOB 30 degrees or less, Lift sheet, Lift team/patient mobility team

## 2020-11-22 LAB
ALBUMIN SERPL-MCNC: 2.4 G/DL (ref 3.5–5)
ALBUMIN/GLOB SERPL: 0.6 {RATIO} (ref 1.1–2.2)
ALP SERPL-CCNC: 74 U/L (ref 45–117)
ALT SERPL-CCNC: 10 U/L (ref 12–78)
ANION GAP SERPL CALC-SCNC: 7 MMOL/L (ref 5–15)
AST SERPL-CCNC: 8 U/L (ref 15–37)
BACTERIA SPEC CULT: ABNORMAL
BILIRUB SERPL-MCNC: 0.3 MG/DL (ref 0.2–1)
BUN SERPL-MCNC: 29 MG/DL (ref 6–20)
BUN/CREAT SERPL: 30 (ref 12–20)
CALCIUM SERPL-MCNC: 8.3 MG/DL (ref 8.5–10.1)
CC UR VC: ABNORMAL
CHLORIDE SERPL-SCNC: 113 MMOL/L (ref 97–108)
CO2 SERPL-SCNC: 19 MMOL/L (ref 21–32)
CREAT SERPL-MCNC: 0.96 MG/DL (ref 0.55–1.02)
ERYTHROCYTE [DISTWIDTH] IN BLOOD BY AUTOMATED COUNT: 16.6 % (ref 11.5–14.5)
GLOBULIN SER CALC-MCNC: 3.9 G/DL (ref 2–4)
GLUCOSE BLD STRIP.AUTO-MCNC: 170 MG/DL (ref 65–100)
GLUCOSE BLD STRIP.AUTO-MCNC: 183 MG/DL (ref 65–100)
GLUCOSE BLD STRIP.AUTO-MCNC: 184 MG/DL (ref 65–100)
GLUCOSE BLD STRIP.AUTO-MCNC: 195 MG/DL (ref 65–100)
GLUCOSE SERPL-MCNC: 160 MG/DL (ref 65–100)
HCT VFR BLD AUTO: 32.8 % (ref 35–47)
HGB BLD-MCNC: 9.1 G/DL (ref 11.5–16)
MCH RBC QN AUTO: 22.6 PG (ref 26–34)
MCHC RBC AUTO-ENTMCNC: 27.7 G/DL (ref 30–36.5)
MCV RBC AUTO: 81.4 FL (ref 80–99)
NRBC # BLD: 0 K/UL (ref 0–0.01)
NRBC BLD-RTO: 0 PER 100 WBC
PLATELET # BLD AUTO: 492 K/UL (ref 150–400)
PMV BLD AUTO: 8.9 FL (ref 8.9–12.9)
POTASSIUM SERPL-SCNC: 4.3 MMOL/L (ref 3.5–5.1)
PROT SERPL-MCNC: 6.3 G/DL (ref 6.4–8.2)
RBC # BLD AUTO: 4.03 M/UL (ref 3.8–5.2)
SERVICE CMNT-IMP: ABNORMAL
SODIUM SERPL-SCNC: 139 MMOL/L (ref 136–145)
WBC # BLD AUTO: 14.1 K/UL (ref 3.6–11)

## 2020-11-22 PROCEDURE — 99223 1ST HOSP IP/OBS HIGH 75: CPT | Performed by: SPECIALIST

## 2020-11-22 PROCEDURE — 74011000250 HC RX REV CODE- 250: Performed by: HOSPITALIST

## 2020-11-22 PROCEDURE — 74011000258 HC RX REV CODE- 258: Performed by: HOSPITALIST

## 2020-11-22 PROCEDURE — 36415 COLL VENOUS BLD VENIPUNCTURE: CPT

## 2020-11-22 PROCEDURE — 80053 COMPREHEN METABOLIC PANEL: CPT

## 2020-11-22 PROCEDURE — 85027 COMPLETE CBC AUTOMATED: CPT

## 2020-11-22 PROCEDURE — 74011250637 HC RX REV CODE- 250/637: Performed by: HOSPITALIST

## 2020-11-22 PROCEDURE — 74011636637 HC RX REV CODE- 636/637: Performed by: HOSPITALIST

## 2020-11-22 PROCEDURE — 65660000001 HC RM ICU INTERMED STEPDOWN

## 2020-11-22 PROCEDURE — C9113 INJ PANTOPRAZOLE SODIUM, VIA: HCPCS | Performed by: HOSPITALIST

## 2020-11-22 PROCEDURE — 74011250636 HC RX REV CODE- 250/636: Performed by: HOSPITALIST

## 2020-11-22 PROCEDURE — 74011000250 HC RX REV CODE- 250: Performed by: SPECIALIST

## 2020-11-22 PROCEDURE — 74011250636 HC RX REV CODE- 250/636: Performed by: SURGERY

## 2020-11-22 PROCEDURE — 74011000258 HC RX REV CODE- 258: Performed by: SPECIALIST

## 2020-11-22 PROCEDURE — 99231 SBSQ HOSP IP/OBS SF/LOW 25: CPT | Performed by: SURGERY

## 2020-11-22 PROCEDURE — 82962 GLUCOSE BLOOD TEST: CPT

## 2020-11-22 PROCEDURE — 93005 ELECTROCARDIOGRAM TRACING: CPT

## 2020-11-22 PROCEDURE — 74011000258 HC RX REV CODE- 258: Performed by: SURGERY

## 2020-11-22 PROCEDURE — 74011250636 HC RX REV CODE- 250/636: Performed by: SPECIALIST

## 2020-11-22 RX ORDER — DIGOXIN 0.25 MG/ML
250 INJECTION INTRAMUSCULAR; INTRAVENOUS
Status: COMPLETED | OUTPATIENT
Start: 2020-11-22 | End: 2020-11-22

## 2020-11-22 RX ADMIN — DONEPEZIL HYDROCHLORIDE 10 MG: 10 TABLET, FILM COATED ORAL at 21:28

## 2020-11-22 RX ADMIN — SODIUM CHLORIDE 10 ML: 9 INJECTION INTRAMUSCULAR; INTRAVENOUS; SUBCUTANEOUS at 15:01

## 2020-11-22 RX ADMIN — PIPERACILLIN AND TAZOBACTAM 3.38 G: 3; .375 INJECTION, POWDER, LYOPHILIZED, FOR SOLUTION INTRAVENOUS at 15:01

## 2020-11-22 RX ADMIN — DILTIAZEM HYDROCHLORIDE 7.5 MG/HR: 5 INJECTION, SOLUTION INTRAVENOUS at 13:46

## 2020-11-22 RX ADMIN — INSULIN LISPRO 2 UNITS: 100 INJECTION, SOLUTION INTRAVENOUS; SUBCUTANEOUS at 23:50

## 2020-11-22 RX ADMIN — DILTIAZEM HYDROCHLORIDE 2.5 MG/HR: 5 INJECTION, SOLUTION INTRAVENOUS at 12:54

## 2020-11-22 RX ADMIN — DILTIAZEM HYDROCHLORIDE 2.5 MG/HR: 5 INJECTION, SOLUTION INTRAVENOUS at 23:51

## 2020-11-22 RX ADMIN — INSULIN LISPRO 2 UNITS: 100 INJECTION, SOLUTION INTRAVENOUS; SUBCUTANEOUS at 06:17

## 2020-11-22 RX ADMIN — DIGOXIN 250 MCG: 0.25 INJECTION INTRAMUSCULAR; INTRAVENOUS at 14:49

## 2020-11-22 RX ADMIN — SODIUM CHLORIDE 10 ML: 9 INJECTION INTRAMUSCULAR; INTRAVENOUS; SUBCUTANEOUS at 21:29

## 2020-11-22 RX ADMIN — DILTIAZEM HYDROCHLORIDE 7.5 MG/HR: 5 INJECTION, SOLUTION INTRAVENOUS at 19:47

## 2020-11-22 RX ADMIN — PIPERACILLIN AND TAZOBACTAM 3.38 G: 3; .375 INJECTION, POWDER, LYOPHILIZED, FOR SOLUTION INTRAVENOUS at 23:50

## 2020-11-22 RX ADMIN — SODIUM CHLORIDE 40 MG: 9 INJECTION INTRAMUSCULAR; INTRAVENOUS; SUBCUTANEOUS at 21:28

## 2020-11-22 RX ADMIN — DILTIAZEM HYDROCHLORIDE 5 MG/HR: 5 INJECTION, SOLUTION INTRAVENOUS at 21:27

## 2020-11-22 RX ADMIN — DILTIAZEM HYDROCHLORIDE 10 MG/HR: 5 INJECTION, SOLUTION INTRAVENOUS at 14:18

## 2020-11-22 RX ADMIN — PIPERACILLIN AND TAZOBACTAM 3.38 G: 3; .375 INJECTION, POWDER, LYOPHILIZED, FOR SOLUTION INTRAVENOUS at 08:13

## 2020-11-22 RX ADMIN — ONDANSETRON 4 MG: 2 INJECTION INTRAMUSCULAR; INTRAVENOUS at 06:36

## 2020-11-22 RX ADMIN — SODIUM CHLORIDE 5 ML: 9 INJECTION INTRAMUSCULAR; INTRAVENOUS; SUBCUTANEOUS at 06:00

## 2020-11-22 RX ADMIN — SODIUM CHLORIDE 40 MG: 9 INJECTION INTRAMUSCULAR; INTRAVENOUS; SUBCUTANEOUS at 08:19

## 2020-11-22 RX ADMIN — QUETIAPINE FUMARATE 25 MG: 25 TABLET ORAL at 21:28

## 2020-11-22 NOTE — PROGRESS NOTES
[facility] Adult  Hospitalist Group                                                                                          Hospitalist Progress Note  Akash Romero MD  Answering service: [phone] from in house phone        Date of Service:  2020  NAME:  Kandice Jiang YOB: 1940  MRN:  [id number]      Admission Summary: This is an 49-year-old  female with past medical history significant for Alzheimer's dementia, polymyalgia rheumatica, history of stroke 3 years ago, hypertension, sinus bradycardia, and type 2 diabetes mellitus, who is brought to [facility] Emergency Department from [facility] after the patient experienced vomiting and increasing confusion. The patient with Alzheimer's dementia and poor historian. Most of the information is obtained from her daughter at the bedside. She stated that she has nausea and threw up multiple times since this morning associated and holding her hand on her abdomen. She has also multiple loose stool bowel movement. Her daughter reported that the patient has been slow for the last few days. On baseline, the patient engaged in some conversation and ambulates with a walker. No hematemesis, hematochezia, fever, chills, sweating, cough, contact with COVID-19, denied left-sided chest pain, palpitation or breathing difficulties.        Interval history / Subjective:     Patient with dementia, poor historian, she asking for water,  No bowel movement overnight per her nurse  Developed A Fib with RVR , no left side chest pain or shortness of breath     Assessment & Plan:     UTI POA  -continue ceftriaxone and IVF  -has leukocytosis, afebrile   -blood culture grow gram negative rods, identification pending  -blood cx no growth    Paroxysmal A Fib with RVR  -start cardizem gtt  -echo  -check mg level  -consult cardiologist    Acute metabolic encephalopathy with history of dementia. -likely due to UTI  -patient is conscious and alert, answer simple questions and follows simple command  -continue neuro check and supportive care    Colonic obstruction, possibly due to possible neoplasia.  -NPO, continue normal saline   -Abdomen distended, no vomiting  -seen by surgical service  -seen by GI, recommended CT scan of abdomen with IV and PO contrast    Anemia of chronic disease.  -stable, no evidence of bleeding  -monitor H/H    HTN poorly controlled. -BP improving, now on cardizem gtt, on prn iv hydralazine, monitor BP    T2DM with hyperglycemia. -A1c 7.3  -finger stick glucose 145-200 overnight, 145 this morning  -patient NPO, hold lantus, monitor finger stick glucose on sliding scale    Hx of polymyalgia rheumatica. -stable, continue prednisone     Hx of stroke. -stable, plavix is held for now    Dementia. -stable  -conscious and alert, oriented to self  -continue home aricept q hs        Code status: DNR  DVT prophylaxis:SCD    Care Plan discussed with: Patient/Family, Nurse and   Anticipated Disposition:  PT, OT, RN  Anticipated Discharge: Greater than 48 hours     Hospital Problems  Date Reviewed: 10/9/2017          Codes Class Noted POA    UTI (urinary tract infection) ICD-10-CM: N39.0  ICD-9-CM: 599.0  11/20/2020 Unknown                 Vital Signs:    Last 24hrs VS reviewed since prior progress note. Most recent are:  Visit Vitals  BP (!) 141/75   Pulse (!) 148   Temp 98.1 °F (36.7 °C)   Resp 22   Wt 84.8 kg (187 lb)   SpO2 100%   BMI 30.18 kg/m²       No intake or output data in the 24 hours ending 11/22/20 1243     Physical Examination:     I had a face to face encounter with this patient and independently examined them on 11/22/2020 as outlined below:          Constitutional:  No acute distress, cooperative, pleasant    ENT:  Oral mucosa moist, oropharynx benign. Resp:  CTA bilaterally. No wheezing/rhonchi/rales.  No accessory muscle use   CV:  Regular rhythm, normal rate, no murmurs, gallops, rubs    GI:  Abdomen distended, non tender. normoactive bowel sounds, no hepatosplenomegaly     Musculoskeletal:  No edema     Neurologic:  Conscious and alert, oriented to self, follows simple command, moves all extremities, CN II-XII reviewed     Skin:  Good turgor, no rashes or ulcers       Data Review:    Review and/or order of clinical lab test  Review and/or order of tests in the radiology section of CPT  Review and/or order of tests in the medicine section of CPT      Labs:     Recent Labs     11/22/20 0251 11/21/20 0100   WBC 14.1* 15.2*   HGB 9.1* 8.6*   HCT 32.8* 31.2*   * 508*     Recent Labs     11/22/20 0251 11/21/20 0100 11/20/20  1056    142 138   K 4.3 3.1* 3.9   * 112* 104   CO2 19* 21 28   BUN 29* 25* 29*   CREA 0.96 0.93 1.26*   * 143* 333*   CA 8.3* 8.4* 9.0     Recent Labs     11/22/20 0251 11/21/20 0100 11/20/20  1056   ALT 10* 11* 16   AP 74 73 76   TBILI 0.3 0.2 0.2   TP 6.3* 6.2* 6.8   ALB 2.4* 2.4* 2.7*   GLOB 3.9 3.8 4.1*     No results for input(s): INR, PTP, APTT, INREXT, INREXT in the last 72 hours. No results for input(s): FE, TIBC, PSAT, FERR in the last 72 hours. No results found for: FOL, RBCF   No results for input(s): PH, PCO2, PO2 in the last 72 hours. No results for input(s): CPK, CKNDX, TROIQ in the last 72 hours.     No lab exists for component: CPKMB  Lab Results   Component Value Date/Time    Cholesterol, total 181 01/30/2018 03:35 AM    HDL Cholesterol 72 01/30/2018 03:35 AM    LDL, calculated 94.6 01/30/2018 03:35 AM    Triglyceride 72 01/30/2018 03:35 AM    CHOL/HDL Ratio 2.5 01/30/2018 03:35 AM     Lab Results   Component Value Date/Time    Glucose (POC) 183 (H) 11/22/2020 12:04 PM    Glucose (POC) 184 (H) 11/22/2020 06:15 AM    Glucose (POC) 159 (H) 11/21/2020 09:42 PM    Glucose (POC) 146 (H) 11/21/2020 05:30 PM    Glucose (POC) 130 (H) 11/21/2020 11:49 AM     Lab Results   Component Value Date/Time    Color YELLOW/STRAW 11/20/2020 12:16 PM    Appearance CLOUDY (A) 11/20/2020 12:16 PM    Specific gravity 1.025 11/20/2020 12:16 PM    pH (UA) 5.0 11/20/2020 12:16 PM    Protein TRACE (A) 11/20/2020 12:16 PM    Glucose >1,000 (A) 11/20/2020 12:16 PM    Ketone TRACE (A) 11/20/2020 12:16 PM    Bilirubin Negative 11/20/2020 12:16 PM    Urobilinogen 0.2 11/20/2020 12:16 PM    Nitrites Positive (A) 11/20/2020 12:16 PM    Leukocyte Esterase SMALL (A) 11/20/2020 12:16 PM    Epithelial cells FEW 11/20/2020 12:16 PM    Bacteria 3+ (A) 11/20/2020 12:16 PM    WBC  11/20/2020 12:16 PM    RBC 0-5 11/20/2020 12:16 PM         Medications Reviewed:     Current Facility-Administered Medications   Medication Dose Route Frequency    dilTIAZem (CARDIZEM) 125 mg in dextrose 5% 125 mL infusion  10 mg/hr IntraVENous TITRATE    donepeziL (ARICEPT) tablet 10 mg  10 mg Oral QHS    piperacillin-tazobactam (ZOSYN) 3.375 g in 0.9% sodium chloride (MBP/ADV) 100 mL MBP  3.375 g IntraVENous Q8H    0.9% sodium chloride infusion  75 mL/hr IntraVENous CONTINUOUS    acetaminophen (TYLENOL) tablet 650 mg  650 mg Oral Q6H PRN    calcium-vitamin D (OS-GENOVEVA +D3) 500 mg-200 unit per tablet 1 Tab  1 Tab Oral BID    cyanocobalamin (VITAMIN B12) tablet 500 mcg  500 mcg Oral DAILY    lactobac ac& pc-s.therm-b.anim (TEJ Q/RISAQUAD)  1 Cap Oral DAILY    loratadine (CLARITIN) tablet 10 mg  10 mg Oral DAILY PRN    predniSONE (DELTASONE) tablet 5 mg  5 mg Oral DAILY WITH BREAKFAST    sodium chloride (NS) flush 5-40 mL  5-40 mL IntraVENous Q8H    sodium chloride (NS) flush 5-40 mL  5-40 mL IntraVENous PRN    insulin lispro (HUMALOG) injection   SubCUTAneous Q6H    glucose chewable tablet 16 g  4 Tab Oral PRN    glucagon (GLUCAGEN) injection 1 mg  1 mg IntraMUSCular PRN    hydrALAZINE (APRESOLINE) 20 mg/mL injection 10 mg  10 mg IntraVENous Q6H PRN    pantoprazole (PROTONIX) 40 mg in 0.9% sodium chloride 10 mL injection  40 mg IntraVENous Q12H    ondansetron (ZOFRAN) injection 4 mg  4 mg IntraVENous Q6H PRN    [Held by provider] insulin glargine (LANTUS) injection 12 Units  12 Units SubCUTAneous QHS    QUEtiapine (SEROquel) tablet 25 mg  25 mg Oral QHS PRN    dextrose 10 % infusion 125-250 mL  125-250 mL IntraVENous PRN     ______________________________________________________________________  EXPECTED LENGTH OF STAY: - - -  ACTUAL LENGTH OF STAY:          2                 George Loredo MD

## 2020-11-22 NOTE — PROGRESS NOTES
11/22/20 1341   Vitals   Temp 97.9 °F (36.6 °C)   Temp Source Oral   Pulse (Heart Rate) (!) 146   Heart Rate Source Monitor   Resp Rate 19   O2 Sat (%) 95 %   Level of Consciousness Alert   BP (!) 106/54   MAP (Calculated) 71   BP 1 Location Right arm   BP 1 Method Automatic   BP Patient Position At rest   Cardiac Rhythm A Fib   MEWS Score 4   MD aware of HR Cardizem started

## 2020-11-22 NOTE — PROGRESS NOTES
Problem: Falls - Risk of  Goal: *Absence of Falls  Description: Document Albino Messing Fall Risk and appropriate interventions in the flowsheet. Outcome: Progressing Towards Goal  Note: Fall Risk Interventions:  Mobility Interventions: Bed/chair exit alarm, Communicate number of staff needed for ambulation/transfer, OT consult for ADLs, Patient to call before getting OOB, PT Consult for mobility concerns, PT Consult for assist device competence, Strengthening exercises (ROM-active/passive)    Mentation Interventions: Adequate sleep, hydration, pain control, Bed/chair exit alarm, Door open when patient unattended, Evaluate medications/consider consulting pharmacy, Familiar objects from home, Family/sitter at bedside, Increase mobility, More frequent rounding, Reorient patient, Room close to nurse's station, Toileting rounds, Update white board    Medication Interventions: Bed/chair exit alarm, Evaluate medications/consider consulting pharmacy, Patient to call before getting OOB, Teach patient to arise slowly    Elimination Interventions: Bed/chair exit alarm, Call light in reach, Patient to call for help with toileting needs, Stay With Me (per policy), Toilet paper/wipes in reach, Toileting schedule/hourly rounds              Problem: Patient Education: Go to Patient Education Activity  Goal: Patient/Family Education  Outcome: Progressing Towards Goal     Problem: Diabetes Self-Management  Goal: *Disease process and treatment process  Description: Define diabetes and identify own type of diabetes; list 3 options for treating diabetes. Outcome: Progressing Towards Goal  Goal: *Incorporating nutritional management into lifestyle  Description: Describe effect of type, amount and timing of food on blood glucose; list 3 methods for planning meals. Outcome: Progressing Towards Goal  Goal: *Incorporating physical activity into lifestyle  Description: State effect of exercise on blood glucose levels.   Outcome: Progressing Towards Goal  Goal: *Developing strategies to promote health/change behavior  Description: Define the ABC's of diabetes; identify appropriate screenings, schedule and personal plan for screenings. Outcome: Progressing Towards Goal  Goal: *Using medications safely  Description: State effect of diabetes medications on diabetes; name diabetes medication taking, action and side effects. Outcome: Progressing Towards Goal  Goal: *Monitoring blood glucose, interpreting and using results  Description: Identify recommended blood glucose targets  and personal targets. Outcome: Progressing Towards Goal  Goal: *Prevention, detection, treatment of acute complications  Description: List symptoms of hyper- and hypoglycemia; describe how to treat low blood sugar and actions for lowering  high blood glucose level. Outcome: Progressing Towards Goal  Goal: *Prevention, detection and treatment of chronic complications  Description: Define the natural course of diabetes and describe the relationship of blood glucose levels to long term complications of diabetes. Outcome: Progressing Towards Goal  Goal: *Developing strategies to address psychosocial issues  Description: Describe feelings about living with diabetes; identify support needed and support network  Outcome: Progressing Towards Goal  Goal: *Insulin pump training  Outcome: Progressing Towards Goal  Goal: *Sick day guidelines  Outcome: Progressing Towards Goal     Problem: Patient Education: Go to Patient Education Activity  Goal: Patient/Family Education  Outcome: Progressing Towards Goal     Problem: Pressure Injury - Risk of  Goal: *Prevention of pressure injury  Description: Document Elieser Scale and appropriate interventions in the flowsheet.   Outcome: Progressing Towards Goal  Note: Pressure Injury Interventions:  Sensory Interventions: Assess changes in LOC, Avoid rigorous massage over bony prominences, Check visual cues for pain, Discuss PT/OT consult with provider, Float heels, Keep linens dry and wrinkle-free, Maintain/enhance activity level, Minimize linen layers, Pressure redistribution bed/mattress (bed type), Turn and reposition approx. every two hours (pillows and wedges if needed)    Moisture Interventions: Absorbent underpads, Maintain skin hydration (lotion/cream), Minimize layers, Moisture barrier, Apply protective barrier, creams and emollients, Check for incontinence Q2 hours and as needed    Activity Interventions: Increase time out of bed, Pressure redistribution bed/mattress(bed type), PT/OT evaluation    Mobility Interventions: Float heels, HOB 30 degrees or less, Pressure redistribution bed/mattress (bed type), PT/OT evaluation, Turn and reposition approx.  every two hours(pillow and wedges)    Nutrition Interventions: Document food/fluid/supplement intake, Offer support with meals,snacks and hydration    Friction and Shear Interventions: Apply protective barrier, creams and emollients, Feet elevated on foot rest, Lift sheet, Minimize layers                Problem: Patient Education: Go to Patient Education Activity  Goal: Patient/Family Education  Outcome: Progressing Towards Goal
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
## 2020-11-22 NOTE — PROGRESS NOTES
Progress Note    Patient: Crow Corey MRN: [id number]  SSN: [id number]    YOB: 1940  Age: 78 y.o. Sex: female      Admit Date: 2020    * No surgery found *    Procedure:      Subjective:     Patient went into Afib with RVR  She has had multiple BM over the past 24 hours. Objective:     Visit Vitals  BP (!) 134/94   Pulse (!) 142   Temp 97.9 °F (36.6 °C)   Resp 19   Wt 84.8 kg (187 lb)   SpO2 95%   BMI 30.18 kg/m²       Temp (24hrs), Av.2 °F (36.8 °C), Min:97.9 °F (36.6 °C), Max:98.7 °F (37.1 °C)      Physical Exam:    GENERAL: alert, cooperative, no distress, appears stated age, LUNG: clear to auscultation bilaterally, HEART: irregularly irregular rhythm, ABDOMEN: soft, non-tender. Bowel sounds normal. No masses,  no organomegaly    Data Review: images and reports reviewed    Lab Review: All lab results for the last 24 hours reviewed.   Recent Results (from the past 24 hour(s))   GLUCOSE, POC    Collection Time: 20  5:30 PM   Result Value Ref Range    Glucose (POC) 146 (H) 65 - 100 mg/dL    Performed by Will De La Cruz    GLUCOSE, POC    Collection Time: 20  9:42 PM   Result Value Ref Range    Glucose (POC) 159 (H) 65 - 100 mg/dL    Performed by Alanna Mcgraw    CBC W/O DIFF    Collection Time: 20  2:51 AM   Result Value Ref Range    WBC 14.1 (H) 3.6 - 11.0 K/uL    RBC 4.03 3.80 - 5.20 M/uL    HGB 9.1 (L) 11.5 - 16.0 g/dL    HCT 32.8 (L) 35.0 - 47.0 %    MCV 81.4 80.0 - 99.0 FL    MCH 22.6 (L) 26.0 - 34.0 PG    MCHC 27.7 (L) 30.0 - 36.5 g/dL    RDW 16.6 (H) 11.5 - 14.5 %    PLATELET 208 (H) 742 - 400 K/uL    MPV 8.9 8.9 - 12.9 FL    NRBC 0.0 0  WBC    ABSOLUTE NRBC 0.00 0.00 - 2.22 K/uL   METABOLIC PANEL, COMPREHENSIVE    Collection Time: 20  2:51 AM   Result Value Ref Range    Sodium 139 136 - 145 mmol/L    Potassium 4.3 3.5 - 5.1 mmol/L    Chloride 113 (H) 97 - 108 mmol/L    CO2 19 (L) 21 - 32 mmol/L    Anion gap 7 5 - 15 mmol/L    Glucose 160 (H) 65 - 100 mg/dL    BUN 29 (H) 6 - 20 MG/DL    Creatinine 0.96 0.55 - 1.02 MG/DL    BUN/Creatinine ratio 30 (H) 12 - 20      GFR est AA >60 >60 ml/min/1.73m2    GFR est non-AA 56 (L) >60 ml/min/1.73m2    Calcium 8.3 (L) 8.5 - 10.1 MG/DL    Bilirubin, total 0.3 0.2 - 1.0 MG/DL    ALT (SGPT) 10 (L) 12 - 78 U/L    AST (SGOT) 8 (L) 15 - 37 U/L    Alk. phosphatase 74 45 - 117 U/L    Protein, total 6.3 (L) 6.4 - 8.2 g/dL    Albumin 2.4 (L) 3.5 - 5.0 g/dL    Globulin 3.9 2.0 - 4.0 g/dL    A-G Ratio 0.6 (L) 1.1 - 2.2     GLUCOSE, POC    Collection Time: 11/22/20  6:15 AM   Result Value Ref Range    Glucose (POC) 184 (H) 65 - 100 mg/dL    Performed by An Laguerre, POC    Collection Time: 11/22/20 12:04 PM   Result Value Ref Range    Glucose (POC) 183 (H) 65 - 100 mg/dL    Performed by Lilibeth Landry    EKG, 12 LEAD, INITIAL    Collection Time: 11/22/20 12:21 PM   Result Value Ref Range    Ventricular Rate 146 BPM    Atrial Rate 138 BPM    QRS Duration 88 ms    Q-T Interval 308 ms    QTC Calculation (Bezet) 479 ms    Calculated R Axis -16 degrees    Calculated T Axis 163 degrees    Diagnosis       Atrial fibrillation with rapid ventricular response  ST & T wave abnormality, consider lateral ischemia or digitalis effect  When compared with ECG of 02-AUG-2018 19:48,  Atrial fibrillation has replaced Sinus rhythm  Questionable change in QRS duration  Criteria for Septal infarct are no longer present         Assessment:     Hospital Problems  Date Reviewed: 10/9/2017          Codes Class Noted POA    UTI (urinary tract infection) ICD-10-CM: N39.0  ICD-9-CM: 599.0  11/20/2020 Unknown              Plan/Recommendations/Medical Decision Making:   Possible obstruction seems to be improving with multiple Bm  Plan is for repeat CT in am with contrast  Continue ABX  Sips of clears until next CT  Treatment for Afib.

## 2020-11-22 NOTE — PROGRESS NOTES
.Bedside and Verbal shift change report given to Corina Avila (oncoming nurse) by Florentin Poole RN (offgoing nurse). Report included the following information SBAR, Kardex, ED Summary, Procedure Summary, Intake/Output, MAR, Recent Results, Cardiac Rhythm NSR, Quality Measures and Dual Neuro Assessment.

## 2020-11-23 ENCOUNTER — APPOINTMENT (OUTPATIENT)
Dept: CT IMAGING | Age: 80
DRG: 689 | End: 2020-11-23
Attending: HOSPITALIST
Payer: MEDICARE

## 2020-11-23 LAB
ALBUMIN SERPL-MCNC: 2.3 G/DL (ref 3.5–5)
ALBUMIN/GLOB SERPL: 0.6 {RATIO} (ref 1.1–2.2)
ALP SERPL-CCNC: 68 U/L (ref 45–117)
ALT SERPL-CCNC: 14 U/L (ref 12–78)
ANION GAP SERPL CALC-SCNC: 10 MMOL/L (ref 5–15)
AST SERPL-CCNC: 13 U/L (ref 15–37)
BASOPHILS # BLD: 0 K/UL (ref 0–0.1)
BASOPHILS NFR BLD: 0 % (ref 0–1)
BILIRUB SERPL-MCNC: 0.3 MG/DL (ref 0.2–1)
BUN SERPL-MCNC: 34 MG/DL (ref 6–20)
BUN/CREAT SERPL: 32 (ref 12–20)
CALCIUM SERPL-MCNC: 8.4 MG/DL (ref 8.5–10.1)
CHLORIDE SERPL-SCNC: 113 MMOL/L (ref 97–108)
CO2 SERPL-SCNC: 19 MMOL/L (ref 21–32)
CREAT SERPL-MCNC: 1.06 MG/DL (ref 0.55–1.02)
DIFFERENTIAL METHOD BLD: ABNORMAL
EOSINOPHIL # BLD: 0 K/UL (ref 0–0.4)
EOSINOPHIL NFR BLD: 0 % (ref 0–7)
ERYTHROCYTE [DISTWIDTH] IN BLOOD BY AUTOMATED COUNT: 16.5 % (ref 11.5–14.5)
GLOBULIN SER CALC-MCNC: 3.7 G/DL (ref 2–4)
GLUCOSE BLD STRIP.AUTO-MCNC: 137 MG/DL (ref 65–100)
GLUCOSE BLD STRIP.AUTO-MCNC: 155 MG/DL (ref 65–100)
GLUCOSE BLD STRIP.AUTO-MCNC: 170 MG/DL (ref 65–100)
GLUCOSE BLD STRIP.AUTO-MCNC: 204 MG/DL (ref 65–100)
GLUCOSE SERPL-MCNC: 192 MG/DL (ref 65–100)
HCT VFR BLD AUTO: 32.3 % (ref 35–47)
HGB BLD-MCNC: 9 G/DL (ref 11.5–16)
IMM GRANULOCYTES # BLD AUTO: 0 K/UL (ref 0–0.04)
IMM GRANULOCYTES NFR BLD AUTO: 0 % (ref 0–0.5)
LYMPHOCYTES # BLD: 1.1 K/UL (ref 0.8–3.5)
LYMPHOCYTES NFR BLD: 7 % (ref 12–49)
MAGNESIUM SERPL-MCNC: 2.1 MG/DL (ref 1.6–2.4)
MCH RBC QN AUTO: 22.4 PG (ref 26–34)
MCHC RBC AUTO-ENTMCNC: 27.9 G/DL (ref 30–36.5)
MCV RBC AUTO: 80.3 FL (ref 80–99)
MONOCYTES # BLD: 1.4 K/UL (ref 0–1)
MONOCYTES NFR BLD: 9 % (ref 5–13)
NEUTS SEG # BLD: 12.6 K/UL (ref 1.8–8)
NEUTS SEG NFR BLD: 84 % (ref 32–75)
NRBC # BLD: 0 K/UL (ref 0–0.01)
NRBC BLD-RTO: 0 PER 100 WBC
PLATELET # BLD AUTO: 468 K/UL (ref 150–400)
PLATELET COMMENTS,PCOM: ABNORMAL
PMV BLD AUTO: 8.9 FL (ref 8.9–12.9)
POTASSIUM SERPL-SCNC: 3.7 MMOL/L (ref 3.5–5.1)
PROT SERPL-MCNC: 6 G/DL (ref 6.4–8.2)
RBC # BLD AUTO: 4.02 M/UL (ref 3.8–5.2)
RBC MORPH BLD: ABNORMAL
RBC MORPH BLD: ABNORMAL
SERVICE CMNT-IMP: ABNORMAL
SODIUM SERPL-SCNC: 142 MMOL/L (ref 136–145)
WBC # BLD AUTO: 15.1 K/UL (ref 3.6–11)

## 2020-11-23 PROCEDURE — 36415 COLL VENOUS BLD VENIPUNCTURE: CPT

## 2020-11-23 PROCEDURE — 74011250637 HC RX REV CODE- 250/637: Performed by: HOSPITALIST

## 2020-11-23 PROCEDURE — 74011250636 HC RX REV CODE- 250/636: Performed by: SURGERY

## 2020-11-23 PROCEDURE — 74011636637 HC RX REV CODE- 636/637: Performed by: HOSPITALIST

## 2020-11-23 PROCEDURE — 83735 ASSAY OF MAGNESIUM: CPT

## 2020-11-23 PROCEDURE — 74011250636 HC RX REV CODE- 250/636: Performed by: HOSPITALIST

## 2020-11-23 PROCEDURE — 74011000258 HC RX REV CODE- 258: Performed by: SURGERY

## 2020-11-23 PROCEDURE — 74011000636 HC RX REV CODE- 636: Performed by: HOSPITALIST

## 2020-11-23 PROCEDURE — 85025 COMPLETE CBC W/AUTO DIFF WBC: CPT

## 2020-11-23 PROCEDURE — 65660000001 HC RM ICU INTERMED STEPDOWN

## 2020-11-23 PROCEDURE — 99232 SBSQ HOSP IP/OBS MODERATE 35: CPT | Performed by: SPECIALIST

## 2020-11-23 PROCEDURE — 82962 GLUCOSE BLOOD TEST: CPT

## 2020-11-23 PROCEDURE — 74011000250 HC RX REV CODE- 250: Performed by: HOSPITALIST

## 2020-11-23 PROCEDURE — 99223 1ST HOSP IP/OBS HIGH 75: CPT | Performed by: NURSE PRACTITIONER

## 2020-11-23 PROCEDURE — 80053 COMPREHEN METABOLIC PANEL: CPT

## 2020-11-23 PROCEDURE — 74011250637 HC RX REV CODE- 250/637: Performed by: NURSE PRACTITIONER

## 2020-11-23 PROCEDURE — 74177 CT ABD & PELVIS W/CONTRAST: CPT

## 2020-11-23 PROCEDURE — 99231 SBSQ HOSP IP/OBS SF/LOW 25: CPT | Performed by: NURSE PRACTITIONER

## 2020-11-23 PROCEDURE — C9113 INJ PANTOPRAZOLE SODIUM, VIA: HCPCS | Performed by: HOSPITALIST

## 2020-11-23 RX ORDER — MORPHINE SULFATE 2 MG/ML
2 INJECTION, SOLUTION INTRAMUSCULAR; INTRAVENOUS
Status: DISCONTINUED | OUTPATIENT
Start: 2020-11-23 | End: 2020-11-25 | Stop reason: HOSPADM

## 2020-11-23 RX ORDER — SODIUM CHLORIDE 0.9 % (FLUSH) 0.9 %
10 SYRINGE (ML) INJECTION
Status: DISCONTINUED | OUTPATIENT
Start: 2020-11-23 | End: 2020-11-23

## 2020-11-23 RX ORDER — METOPROLOL TARTRATE 25 MG/1
12.5 TABLET, FILM COATED ORAL 2 TIMES DAILY
Status: DISCONTINUED | OUTPATIENT
Start: 2020-11-23 | End: 2020-11-25 | Stop reason: HOSPADM

## 2020-11-23 RX ORDER — HALOPERIDOL 5 MG/ML
3 INJECTION INTRAMUSCULAR
Status: DISCONTINUED | OUTPATIENT
Start: 2020-11-23 | End: 2020-11-25 | Stop reason: HOSPADM

## 2020-11-23 RX ADMIN — IOPAMIDOL 100 ML: 755 INJECTION, SOLUTION INTRAVENOUS at 09:36

## 2020-11-23 RX ADMIN — DONEPEZIL HYDROCHLORIDE 10 MG: 10 TABLET, FILM COATED ORAL at 21:54

## 2020-11-23 RX ADMIN — Medication 1 TABLET: at 10:07

## 2020-11-23 RX ADMIN — PIPERACILLIN AND TAZOBACTAM 3.38 G: 3; .375 INJECTION, POWDER, LYOPHILIZED, FOR SOLUTION INTRAVENOUS at 08:40

## 2020-11-23 RX ADMIN — SODIUM CHLORIDE 10 ML: 9 INJECTION INTRAMUSCULAR; INTRAVENOUS; SUBCUTANEOUS at 13:38

## 2020-11-23 RX ADMIN — Medication 1 TABLET: at 17:09

## 2020-11-23 RX ADMIN — Medication 1 CAPSULE: at 10:15

## 2020-11-23 RX ADMIN — SODIUM CHLORIDE 10 ML: 9 INJECTION INTRAMUSCULAR; INTRAVENOUS; SUBCUTANEOUS at 05:23

## 2020-11-23 RX ADMIN — SODIUM CHLORIDE 40 MG: 9 INJECTION INTRAMUSCULAR; INTRAVENOUS; SUBCUTANEOUS at 08:40

## 2020-11-23 RX ADMIN — CYANOCOBALAMIN TAB 500 MCG 500 MCG: 500 TAB at 10:07

## 2020-11-23 RX ADMIN — PREDNISONE 5 MG: 5 TABLET ORAL at 10:07

## 2020-11-23 RX ADMIN — IOHEXOL 50 ML: 240 INJECTION, SOLUTION INTRATHECAL; INTRAVASCULAR; INTRAVENOUS; ORAL at 09:35

## 2020-11-23 RX ADMIN — METOPROLOL TARTRATE 12.5 MG: 25 TABLET, FILM COATED ORAL at 15:05

## 2020-11-23 RX ADMIN — INSULIN LISPRO 3 UNITS: 100 INJECTION, SOLUTION INTRAVENOUS; SUBCUTANEOUS at 23:41

## 2020-11-23 RX ADMIN — SODIUM CHLORIDE 40 MG: 9 INJECTION INTRAMUSCULAR; INTRAVENOUS; SUBCUTANEOUS at 21:53

## 2020-11-23 RX ADMIN — INSULIN LISPRO 2 UNITS: 100 INJECTION, SOLUTION INTRAVENOUS; SUBCUTANEOUS at 12:25

## 2020-11-23 RX ADMIN — INSULIN LISPRO 2 UNITS: 100 INJECTION, SOLUTION INTRAVENOUS; SUBCUTANEOUS at 05:32

## 2020-11-23 NOTE — PROGRESS NOTES
Spiritual Care Assessment/Progress Note  [facility]      NAME: Ba Louie      MRN: [id number]  AGE: 78 y.o.  SEX: female  Baptism Affiliation: No Episcopal   Language: English     11/23/2020     Total Time (in minutes): 8     Spiritual Assessment begun in [facility] through conversation with:         []Patient        [] Family    [] Friend(s)        Reason for Consult: Palliative Care, Initial/Spiritual Assessment     Spiritual beliefs: (Please include comment if needed)     [] Identifies with a ion tradition:         [] Supported by a ion community:            [] Claims no spiritual orientation:           [] Seeking spiritual identity:                [] Adheres to an individual form of spirituality:           [x] Not able to assess: pt did not indicate                          Identified resources for coping:      [] Prayer                               [] Music                  [] Guided Imagery     [x] Family/friends                 [] Pet visits     [] Devotional reading                         [] Unknown     [] Other                                              Interventions offered during this visit: (See comments for more details)    Patient Interventions: Initial/Spiritual assessment, patient floor     Family/Friend(s): Initial Assessment, Prayer (assurance of)     Plan of Care:     [] Support spiritual and/or cultural needs    [] Support AMD and/or advance care planning process      [] Support grieving process   [] Coordinate Rites and/or Rituals    [] Coordination with community clergy   [] No spiritual needs identified at this time   [] Detailed Plan of Care below (See Comments)  [] Make referral to Music Therapy  [] Make referral to Pet Therapy     [] Make referral to Addiction services  [] Make referral to [facility]  [] Make referral to Spiritual Care Partner  [] No future visits requested        [x] Follow up visits as needed     Comments: Visited with pt and luisito daughter who was present at bedside. Pt was enjoying a popsicle. Explored pt and family's needs - no specific spiritual needs expressed at this time. Chaplains remain available for support as needed.     Caroline Gill, Palliative

## 2020-11-23 NOTE — PROGRESS NOTES
1140 - Reason for Admission:   UTI                  RUR Score:   27 %              PCP: First and Last name:  Dr. Mayi Wang   Name of Practice:    Are you a current patient: Yes/No:  Yes   Approximate date of last visit: Couldn't remember. Can you participate in a virtual visit if needed: Unknown    Do you (patient/family) have any concerns for transition/discharge? No. She lives by herself but her younger sister comes and checks up on her several times a week. .                Plan for utilizing home health:   Not at this time. Does not have any HH services or DME in the home. Current Advanced Directive/Advance Care Plan:  At home. Transition of Care Plan:  Home        1100 - Patient sleeping. CM will continue to follow and assist with SAMUEL needs as they arise. Available on [employer]. Shonda  MSN, [address] [facility], RN, [facility] - [phone]. Care Management Interventions  PCP Verified by CM: Yes  Mode of Transport at Discharge:  Other (see comment)  MyChart Signup: No  Discharge Durable Medical Equipment: No  Health Maintenance Reviewed: Yes  Physical Therapy Consult: No  Occupational Therapy Consult: No  Speech Therapy Consult: No  Current Support Network: Lives Alone  Confirm Follow Up Transport: Family  Discharge Location  Discharge Placement: Home

## 2020-11-23 NOTE — PROGRESS NOTES
Bedside and Verbal shift change report given to Annabel Ayala RN (oncoming nurse) by Nathaniel Son RN (offgoing nurse). Report included the following information SBAR, Kardex, Intake/Output, MAR, Recent Results and Cardiac Rhythm NSR.

## 2020-11-23 NOTE — ROUTINE PROCESS
Patient off cardizem gtt since 0400. Continues in sinus rhythm controlled rate 60-70s. Bedside shift change report given to Ana Levin (oncoming nurse) by Lee Ayala (offgoing nurse). Report included the following information SBAR, Kardex and Cardiac Rhythm NSR. RN notified CT that patient began drinking PO contrast solution at 7:15. Patient completed consuming PO contrast at approximately 8:20. CT to call for patient when CT is available after 8:45.

## 2020-11-23 NOTE — PROGRESS NOTES
General Surgery Daily Progress Note    Admit Date: 2020  Post-Operative Day: * No surgery found * from * No surgery found *     Subjective:     Last 24 hrs: Pt sleeping; opens eyes to voice, non conversant. CT results from this morning noted. BMs last night     COLON: Distended with multiple air-fluid levels to mid sigmoid colon where there  is a poorly defined sigmoid colon mass with wall thickening redemonstrated. Distention is slightly worsened compared to prior. Objective:     Blood pressure (!) 124/49, pulse 71, temperature 98.8 °F (37.1 °C), resp. rate 22, weight 83.9 kg (184 lb 14.4 oz), SpO2 95 %. Temp (24hrs), Av.2 °F (36.8 °C), Min:97.4 °F (36.3 °C), Max:99 °F (37.2 °C)      _____________________  Physical Exam:     sleepy, in no acute distress. Cardiovascular: RRR, no peripheral edema  Abdomen: distended, hypoactive BS    Assessment:   Active Problems:    UTI (urinary tract infection) (2020)            Plan:     Would cont clear liquids for now  Will discuss pt with attending. Data Review:    Recent Labs     20  0251 20  0100   WBC 15.1* 14.1* 15.2*   HGB 9.0* 9.1* 8.6*   HCT 32.3* 32.8* 31.2*   * 492* 508*     Recent Labs     20  0251 20  0100    139 142   K 3.7 4.3 3.1*   * 113* 112*   CO2 19* 19* 21   * 160* 143*   BUN 34* 29* 25*   CREA 1.06* 0.96 0.93   CA 8.4* 8.3* 8.4*   MG 2.1  --   --    ALB 2.3* 2.4* 2.4*   ALT 14 10* 11*     No results for input(s): AML, LPSE in the last 72 hours.         ______________________  Medications:    Current Facility-Administered Medications   Medication Dose Route Frequency    sodium chloride 0.9 % bolus infusion 100 mL  100 mL IntraVENous RAD ONCE    sodium chloride (NS) flush 10 mL  10 mL IntraVENous RAD ONCE    dilTIAZem (CARDIZEM) 125 mg in dextrose 5% 125 mL infusion  10 mg/hr IntraVENous TITRATE    donepeziL (ARICEPT) tablet 10 mg  10 mg Oral QHS    piperacillin-tazobactam (ZOSYN) 3.375 g in 0.9% sodium chloride (MBP/ADV) 100 mL MBP  3.375 g IntraVENous Q8H    0.9% sodium chloride infusion  75 mL/hr IntraVENous CONTINUOUS    acetaminophen (TYLENOL) tablet 650 mg  650 mg Oral Q6H PRN    calcium-vitamin D (OS-GENOVEVA +D3) 500 mg-200 unit per tablet 1 Tab  1 Tab Oral BID    cyanocobalamin (VITAMIN B12) tablet 500 mcg  500 mcg Oral DAILY    lactobac ac& pc-s.therm-b.anim (TEJ Q/RISAQUAD)  1 Cap Oral DAILY    loratadine (CLARITIN) tablet 10 mg  10 mg Oral DAILY PRN    predniSONE (DELTASONE) tablet 5 mg  5 mg Oral DAILY WITH BREAKFAST    sodium chloride (NS) flush 5-40 mL  5-40 mL IntraVENous Q8H    sodium chloride (NS) flush 5-40 mL  5-40 mL IntraVENous PRN    insulin lispro (HUMALOG) injection   SubCUTAneous Q6H    glucose chewable tablet 16 g  4 Tab Oral PRN    glucagon (GLUCAGEN) injection 1 mg  1 mg IntraMUSCular PRN    hydrALAZINE (APRESOLINE) 20 mg/mL injection 10 mg  10 mg IntraVENous Q6H PRN    pantoprazole (PROTONIX) 40 mg in 0.9% sodium chloride 10 mL injection  40 mg IntraVENous Q12H    ondansetron (ZOFRAN) injection 4 mg  4 mg IntraVENous Q6H PRN    [Held by provider] insulin glargine (LANTUS) injection 12 Units  12 Units SubCUTAneous QHS    QUEtiapine (SEROquel) tablet 25 mg  25 mg Oral QHS PRN    dextrose 10 % infusion 125-250 mL  125-250 mL IntraVENous PRN       Rosemarie Urias NP  11/23/2020        Pt examined, discussed and reviewed with NP, and questions answered with pt, and I agree/ concur with note NP   CT scan follow-up today November 23, 2020 suggest colonic obstruction from neoplasm in the sigmoid colon. Patient has multiple medical problems including new atrial fibrillation, history of stroke, dementia, DO NOT RESUSCITATE status, apparently living in assisted living and according to daughter Tristan Timmons who have spoken to today, patient did not even want to live in assisted living for the last 5 years where she has been. Apparently there is been progressive decline in her overall condition and mental status since being confined with Covid precautions as well. Although intestinal obstruction can be an uncomfortable situation at end of life, the options at this point are limited. We will ask gastroenterology to see if there is a role for colonic stenting if possible for palliation, palliative care consult to discuss with patient's daughters, apparently Marizol Ham will speak with Sister Maik Matthew, I offered to call as well I needed the phone number, for cell phone, but given the above, I might recommend nonsurgical intervention, either colonoscopy and stent if possible or palliative care but the only surgical options are diverting transverse colostomy which can also be a management problem and the overall morbidity of her medical issues is still not going to change.   I think sigmoid colectomy and anastomosis  or colostomy are not wise, but with all these issues, I certainly think palliative for potential hospice care are reasonable rather than any intervention at all as patient's overall morbidity and quality of life is rather poor and according to the family patient did not even want to be in assisted living facility at all, and likely would be full care afterwards if she survived    Family to discuss this and get back to us
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
## 2020-11-23 NOTE — PROGRESS NOTES
Patient's daughter Sherine Connelly, at phone number [phone], power of , and reiterated the options of trying to extend her life with comfort with sigmoid colectomy possible primary anastomosis possible ostomy but an ostomy in this patient will be difficult to manage given her dementia and obesity and even difficult to create. Apparently gastroenterology has talked to Sherine Connelly already and did not feel that stenting was going to be a very good option and there really risks to that as well. Palliative care is to see the patient also and to consider hospice whether in hospital or at assisted facility. Patient's daughter understands that if no surgery done but obstruction will worsen and can lead to abdominal bloating, perforation and or nausea and vomiting but there are medicines to keep her comfortable. The daughter is adamant that she does not want any surgery and the patient was unhappy in her current situation anyway and daughter feels the patient would not  want to prolong life artificially. Therefore it sounds like we are heading towards palliative care and hospice.   Surgically we are available as needed and will continue to follow

## 2020-11-23 NOTE — PROGRESS NOTES
[address] [facility] Adult  Hospitalist Group                                                                                          Hospitalist Progress Note  Jonathon Bowie MD  Answering service: [phone] from in house phone        Date of Service:  2020  NAME:  Meenu Dumont  :  1940  MRN:  [id number]      Admission Summary: This is an 78-year-old  female with past medical history significant for Alzheimer's dementia, polymyalgia rheumatica, history of stroke 3 years ago, hypertension, sinus bradycardia, and type 2 diabetes mellitus, who is brought to [facility] Emergency Department from [facility] after the patient experienced vomiting and increasing confusion. The patient with Alzheimer's dementia and poor historian. Most of the information is obtained from her daughter at the bedside. She stated that she has nausea and threw up multiple times since this morning associated and holding her hand on her abdomen. She has also multiple loose stool bowel movement. Her daughter reported that the patient has been slow for the last few days. On baseline, the patient engaged in some conversation and ambulates with a walker. No hematemesis, hematochezia, fever, chills, sweating, cough, contact with COVID-19, denied left-sided chest pain, palpitation or breathing difficulties. Interval history / Subjective:     Patient with dementia, poor historian, per her nurse has liquid bowel movement, no left side chest pain or shortness of breath, HR improved and off her cardizem gtt at 4 am,      Assessment & Plan:     E Coli UTI POA  -continue ceftriaxone and IVF  -has leukocytosis, afebrile   -urine cx grow E coli  -blood cx no growth    Paroxysmal A Fib with RVR, now NSR  -off cardizem gtt on  at 4 am  -echo pending  -mg normal  -cardiologist is on board    Acute metabolic encephalopathy with history of dementia.   -likely due to UTI  -patient is conscious and alert, answer simple questions and follows simple command  -continue neuro check and supportive care    Colonic obstruction, possibly due to possible neoplasia.  -NPO, on sip of water, continue normal saline   -Abdomen distended, has liquid bowel movement but no vomiting  -CTA abdomen pelvis persistent colon obstruction likely due to sigmoid neoplasia with  slightly worsened colon distention and development of small ascites. -daughter at bedside and she said she will agree to have her mother if intervention or surgery indicated  -GI and surgical service on board    Anemia of chronic disease.  -stable, no evidence of bleeding  -monitor H/H    HTN poorly controlled. -BP  Normal, on prn iv hydralazine, monitor BP    T2DM with hyperglycemia. -A1c 7.3  -finger stick glucose 145-200 overnight, 145 this morning  -patient NPO, hold lantus, monitor finger stick glucose on sliding scale    Hx of polymyalgia rheumatica. -stable, continue prednisone     Hx of stroke. -stable, plavix is held for now    Dementia. -stable  -conscious and alert, oriented to self  -continue home aricept q hs        Code status: DNR  DVT prophylaxis:SCD    Care Plan discussed with: Patient/Family, Nurse and   Anticipated Disposition:  PT, OT, RN  Anticipated Discharge: Greater than 48 hours     Hospital Problems  Date Reviewed: 11/22/2020          Codes Class Noted POA    UTI (urinary tract infection) ICD-10-CM: N39.0  ICD-9-CM: 599.0  11/20/2020 Unknown                 Vital Signs:    Last 24hrs VS reviewed since prior progress note.  Most recent are:  Visit Vitals  BP (!) 124/49 (BP 1 Location: Right arm, BP Patient Position: At rest)   Pulse 71   Temp 98.8 °F (37.1 °C)   Resp 22   Wt 83.9 kg (184 lb 14.4 oz)   SpO2 95%   BMI 29.84 kg/m²         Intake/Output Summary (Last 24 hours) at 11/23/2020 1146  Last data filed at 11/23/2020 0400  Gross per 24 hour   Intake 196.88 ml   Output    Net 196.88 dony        Physical Examination:     I had a face to face encounter with this patient and independently examined them on 11/23/2020 as outlined below:          Constitutional:  No acute distress, cooperative, pleasant    ENT:  Oral mucosa moist, oropharynx benign. Resp:  CTA bilaterally. No wheezing/rhonchi/rales. No accessory muscle use   CV:  Regular rhythm, normal rate, no murmurs, gallops, rubs    GI:  Abdomen distended, non tender. normoactive bowel sounds, no hepatosplenomegaly     Musculoskeletal:  No edema     Neurologic:  Conscious and alert, oriented to self, follows simple command, moves all extremities, CN II-XII reviewed     Skin:  Good turgor, no rashes or ulcers       Data Review:    Review and/or order of clinical lab test  Review and/or order of tests in the radiology section of CPT  Review and/or order of tests in the medicine section of CPT      Labs:     Recent Labs     11/23/20 0051 11/22/20 0251   WBC 15.1* 14.1*   HGB 9.0* 9.1*   HCT 32.3* 32.8*   * 492*     Recent Labs     11/23/20 0051 11/22/20  0251 11/21/20  0100    139 142   K 3.7 4.3 3.1*   * 113* 112*   CO2 19* 19* 21   BUN 34* 29* 25*   CREA 1.06* 0.96 0.93   * 160* 143*   CA 8.4* 8.3* 8.4*   MG 2.1  --   --      Recent Labs     11/23/20 0051 11/22/20  0251 11/21/20  0100   ALT 14 10* 11*   AP 68 74 73   TBILI 0.3 0.3 0.2   TP 6.0* 6.3* 6.2*   ALB 2.3* 2.4* 2.4*   GLOB 3.7 3.9 3.8     No results for input(s): INR, PTP, APTT, INREXT, INREXT in the last 72 hours. No results for input(s): FE, TIBC, PSAT, FERR in the last 72 hours. No results found for: FOL, RBCF   No results for input(s): PH, PCO2, PO2 in the last 72 hours. No results for input(s): CPK, CKNDX, TROIQ in the last 72 hours.     No lab exists for component: CPKMB  Lab Results   Component Value Date/Time    Cholesterol, total 181 01/30/2018 03:35 AM    HDL Cholesterol 72 01/30/2018 03:35 AM    LDL, calculated 94.6 01/30/2018 03:35 AM Triglyceride 72 01/30/2018 03:35 AM    CHOL/HDL Ratio 2.5 01/30/2018 03:35 AM     Lab Results   Component Value Date/Time    Glucose (POC) 155 (H) 11/23/2020 11:32 AM    Glucose (POC) 170 (H) 11/23/2020 05:22 AM    Glucose (POC) 195 (H) 11/22/2020 11:34 PM    Glucose (POC) 170 (H) 11/22/2020 06:29 PM    Glucose (POC) 183 (H) 11/22/2020 12:04 PM     Lab Results   Component Value Date/Time    Color YELLOW/STRAW 11/20/2020 12:16 PM    Appearance CLOUDY (A) 11/20/2020 12:16 PM    Specific gravity 1.025 11/20/2020 12:16 PM    pH (UA) 5.0 11/20/2020 12:16 PM    Protein TRACE (A) 11/20/2020 12:16 PM    Glucose >1,000 (A) 11/20/2020 12:16 PM    Ketone TRACE (A) 11/20/2020 12:16 PM    Bilirubin Negative 11/20/2020 12:16 PM    Urobilinogen 0.2 11/20/2020 12:16 PM    Nitrites Positive (A) 11/20/2020 12:16 PM    Leukocyte Esterase SMALL (A) 11/20/2020 12:16 PM    Epithelial cells FEW 11/20/2020 12:16 PM    Bacteria 3+ (A) 11/20/2020 12:16 PM    WBC  11/20/2020 12:16 PM    RBC 0-5 11/20/2020 12:16 PM         Medications Reviewed:     Current Facility-Administered Medications   Medication Dose Route Frequency    sodium chloride 0.9 % bolus infusion 100 mL  100 mL IntraVENous RAD ONCE    sodium chloride (NS) flush 10 mL  10 mL IntraVENous RAD ONCE    dilTIAZem (CARDIZEM) 125 mg in dextrose 5% 125 mL infusion  10 mg/hr IntraVENous TITRATE    donepeziL (ARICEPT) tablet 10 mg  10 mg Oral QHS    piperacillin-tazobactam (ZOSYN) 3.375 g in 0.9% sodium chloride (MBP/ADV) 100 mL MBP  3.375 g IntraVENous Q8H    0.9% sodium chloride infusion  75 mL/hr IntraVENous CONTINUOUS    acetaminophen (TYLENOL) tablet 650 mg  650 mg Oral Q6H PRN    calcium-vitamin D (OS-GENOVEVA +D3) 500 mg-200 unit per tablet 1 Tab  1 Tab Oral BID    cyanocobalamin (VITAMIN B12) tablet 500 mcg  500 mcg Oral DAILY    lactobac ac& pc-s.therm-b.anim (TEJ Q/RISAQUAD)  1 Cap Oral DAILY    loratadine (CLARITIN) tablet 10 mg  10 mg Oral DAILY PRN    predniSONE (DELTASONE) tablet 5 mg  5 mg Oral DAILY WITH BREAKFAST    sodium chloride (NS) flush 5-40 mL  5-40 mL IntraVENous Q8H    sodium chloride (NS) flush 5-40 mL  5-40 mL IntraVENous PRN    insulin lispro (HUMALOG) injection   SubCUTAneous Q6H    glucose chewable tablet 16 g  4 Tab Oral PRN    glucagon (GLUCAGEN) injection 1 mg  1 mg IntraMUSCular PRN    hydrALAZINE (APRESOLINE) 20 mg/mL injection 10 mg  10 mg IntraVENous Q6H PRN    pantoprazole (PROTONIX) 40 mg in 0.9% sodium chloride 10 mL injection  40 mg IntraVENous Q12H    ondansetron (ZOFRAN) injection 4 mg  4 mg IntraVENous Q6H PRN    [Held by provider] insulin glargine (LANTUS) injection 12 Units  12 Units SubCUTAneous QHS    QUEtiapine (SEROquel) tablet 25 mg  25 mg Oral QHS PRN    dextrose 10 % infusion 125-250 mL  125-250 mL IntraVENous PRN     ______________________________________________________________________  EXPECTED LENGTH OF STAY: - - -  ACTUAL LENGTH OF STAY:          3                 Carlin Grider MD

## 2020-11-23 NOTE — PROGRESS NOTES
[address]  [address] [facility], [address]       GI PROGRESS NOTE  Will Francisco Santos  [phone] office  [phone] NP/PA in-hospital cell phone M-F until 4:30PM  After 5PM or on weekends, please call  for physician on call      NAME: Tanika Cordova   :  1940   MRN:  [id number]       Subjective:   Patient is unable to provide any significant history. Last documented bowel movement was last night. CT scan was done this morning. Objective:     VITALS:   Last 24hrs VS reviewed since prior progress note. Most recent are:  Visit Vitals  BP (!) 124/49 (BP 1 Location: Right arm, BP Patient Position: At rest)   Pulse 71   Temp 98.8 °F (37.1 °C)   Resp 22   Wt 83.9 kg (184 lb 14.4 oz)   SpO2 95%   BMI 29.84 kg/m²       PHYSICAL EXAM:  General: No acute distress    Neurologic:  Awakens  HEENT: No scleral icterus   Lungs:  No respiratory distress  Heart:  S1 S2  Abdomen: Soft, mildly distended, mild generalized tenderness, no guarding, no rebound. + Bowel sounds.    Extremities: Warm  Psych:   Not anxious or agitated    Lab Data Reviewed:     Recent Results (from the past 24 hour(s))   GLUCOSE, POC    Collection Time: 20 12:04 PM   Result Value Ref Range    Glucose (POC) 183 (H) 65 - 100 mg/dL    Performed by Harry Muro    EKG, 12 LEAD, INITIAL    Collection Time: 20 12:21 PM   Result Value Ref Range    Ventricular Rate 146 BPM    Atrial Rate 138 BPM    QRS Duration 88 ms    Q-T Interval 308 ms    QTC Calculation (Bezet) 479 ms    Calculated R Axis -16 degrees    Calculated T Axis 163 degrees    Diagnosis       Atrial fibrillation with rapid ventricular response  ST & T wave abnormality, consider lateral ischemia or digitalis effect  When compared with ECG of 02-AUG-2018 19:48,  Atrial fibrillation has replaced Sinus rhythm  Questionable change in QRS duration  Criteria for Septal infarct are no longer present     GLUCOSE, POC    Collection Time: 11/22/20  6:29 PM   Result Value Ref Range    Glucose (POC) 170 (H) 65 - 100 mg/dL    Performed by King Caro    GLUCOSE, POC    Collection Time: 11/22/20 11:34 PM   Result Value Ref Range    Glucose (POC) 195 (H) 65 - 100 mg/dL    Performed by Jacinto Marmolejo    CBC WITH AUTOMATED DIFF    Collection Time: 11/23/20 12:51 AM   Result Value Ref Range    WBC 15.1 (H) 3.6 - 11.0 K/uL    RBC 4.02 3.80 - 5.20 M/uL    HGB 9.0 (L) 11.5 - 16.0 g/dL    HCT 32.3 (L) 35.0 - 47.0 %    MCV 80.3 80.0 - 99.0 FL    MCH 22.4 (L) 26.0 - 34.0 PG    MCHC 27.9 (L) 30.0 - 36.5 g/dL    RDW 16.5 (H) 11.5 - 14.5 %    PLATELET 826 (H) 318 - 400 K/uL    MPV 8.9 8.9 - 12.9 FL    NRBC 0.0 0  WBC    ABSOLUTE NRBC 0.00 0.00 - 0.01 K/uL    NEUTROPHILS 84 (H) 32 - 75 %    LYMPHOCYTES 7 (L) 12 - 49 %    MONOCYTES 9 5 - 13 %    EOSINOPHILS 0 0 - 7 %    BASOPHILS 0 0 - 1 %    IMMATURE GRANULOCYTES 0 0.0 - 0.5 %    ABS. NEUTROPHILS 12.6 (H) 1.8 - 8.0 K/UL    ABS. LYMPHOCYTES 1.1 0.8 - 3.5 K/UL    ABS. MONOCYTES 1.4 (H) 0.0 - 1.0 K/UL    ABS. EOSINOPHILS 0.0 0.0 - 0.4 K/UL    ABS. BASOPHILS 0.0 0.0 - 0.1 K/UL    ABS. IMM. GRANS. 0.0 0.00 - 0.04 K/UL    DF SMEAR SCANNED      PLATELET COMMENTS Large Platelets      RBC COMMENTS ANISOCYTOSIS  1+        RBC COMMENTS OVALOCYTES  PRESENT       METABOLIC PANEL, COMPREHENSIVE    Collection Time: 11/23/20 12:51 AM   Result Value Ref Range    Sodium 142 136 - 145 mmol/L    Potassium 3.7 3.5 - 5.1 mmol/L    Chloride 113 (H) 97 - 108 mmol/L    CO2 19 (L) 21 - 32 mmol/L    Anion gap 10 5 - 15 mmol/L    Glucose 192 (H) 65 - 100 mg/dL    BUN 34 (H) 6 - 20 MG/DL    Creatinine 1.06 (H) 0.55 - 1.02 MG/DL    BUN/Creatinine ratio 32 (H) 12 - 20      GFR est AA >60 >60 ml/min/1.73m2    GFR est non-AA 50 (L) >60 ml/min/1.73m2    Calcium 8.4 (L) 8.5 - 10.1 MG/DL    Bilirubin, total 0.3 0.2 - 1.0 MG/DL    ALT (SGPT) 14 12 - 78 U/L    AST (SGOT) 13 (L) 15 - 37 U/L    Alk.  phosphatase 68 45 - 117 U/L    Protein, total 6.0 (L) 6.4 - 8.2 g/dL    Albumin 2.3 (L) 3.5 - 5.0 g/dL    Globulin 3.7 2.0 - 4.0 g/dL    A-G Ratio 0.6 (L) 1.1 - 2.2     MAGNESIUM    Collection Time: 11/23/20 12:51 AM   Result Value Ref Range    Magnesium 2.1 1.6 - 2.4 mg/dL   GLUCOSE, POC    Collection Time: 11/23/20  5:22 AM   Result Value Ref Range    Glucose (POC) 170 (H) 65 - 100 mg/dL    Performed by Joi Carranza        Assessment:   · Obstructing inflamed/infected area in sigmoid colon seen on limited CT scan without contrast: WBC 15.1, Hgb 9.0. Patient Active Problem List   Diagnosis Code    [facility] M35.3    Long term (current) use of systemic steroids Z79.52    Diabetes mellitus, type 2 ([facility] E11.9    Hypercholesterolemia E78.00    Osteopenia M85.80    Alzheimer's dementia ([facility] G30.9, F02.80    Fall W19. XXXA    Abdominal mass R19.00    Osteoarthritis M19.90    HTN, goal below 140/90 I10    History of DVT (deep vein thrombosis) Z86.718    Burn T30.0    Osteomyelitis (HCC) M86.9    Unspecified adverse effect of anesthesia T41.45XA    Dehydration E86.0    Leukocytosis D72.829    Acute ischemic stroke (HCC) I63.9    Left hemiparesis (HCC) G81.94    Torsades de pointes (HCC) I47.2    UTI (urinary tract infection) N39.0     Plan:   · On antibiotics  · Follow CT results  · Surgery following. Cardiology following for atrial fibrillation. Signed By: Richa Ferrara     11/23/2020  10:38 AM       This patient was seen and examined by me in a face-to-face visit today. I reviewed the medical record including lab work, imaging and other provider notes. I confirmed the interval history as described above. I spoke to the patient, however the patient is unable to give a meaningful history. I discussed this case in detail with priscila jeffries. I formulated an updated  assessment of this patient and guided our treatment plan. I agree with the above progress note.  I agree with the history, exam and assessment and plan as outlined in the note. I would like to add the following:   Abdomen soft  Had diarrhea today  Discussed status with daughter at bedside, sonia ( POA).  I also discussed risks and benefits of colonic stenting on her mom, she is leaning for comfort measures/ hospice, she will finalize her decision in am   Will follow

## 2020-11-23 NOTE — PROGRESS NOTES
Patient compliant with antibiotic administration. Afebrile currently. Experiencing frequent stooling.

## 2020-11-23 NOTE — PROGRESS NOTES
Problem: Falls - Risk of  Goal: *Absence of Falls  Description: Document Suma Jones Fall Risk and appropriate interventions in the flowsheet. Outcome: Progressing Towards Goal  Note: Fall Risk Interventions:  Mobility Interventions: Bed/chair exit alarm, Communicate number of staff needed for ambulation/transfer, OT consult for ADLs, Patient to call before getting OOB, PT Consult for mobility concerns, PT Consult for assist device competence    Mentation Interventions: Bed/chair exit alarm, Door open when patient unattended, Adequate sleep, hydration, pain control, Eyeglasses and hearing aids, Room close to nurse's station, Reorient patient, More frequent rounding, Toileting rounds, Update white board    Medication Interventions: Bed/chair exit alarm, Evaluate medications/consider consulting pharmacy, Patient to call before getting OOB, Teach patient to arise slowly    Elimination Interventions: Call light in reach, Bed/chair exit alarm, Patient to call for help with toileting needs, Stay With Me (per policy), Toilet paper/wipes in reach, Toileting schedule/hourly rounds              Problem: Diabetes Self-Management  Goal: *Incorporating physical activity into lifestyle  Description: State effect of exercise on blood glucose levels. Outcome: Progressing Towards Goal     Problem: Pressure Injury - Risk of  Goal: *Prevention of pressure injury  Description: Document Elieser Scale and appropriate interventions in the flowsheet. Outcome: Progressing Towards Goal  Note: Pressure Injury Interventions:  Sensory Interventions: Avoid rigorous massage over bony prominences, Assess changes in LOC, Float heels, Monitor skin under medical devices, Turn and reposition approx.  every two hours (pillows and wedges if needed), Pad between skin to skin, Minimize linen layers, Maintain/enhance activity level, Discuss PT/OT consult with provider, Check visual cues for pain    Moisture Interventions: Assess need for specialty bed, Check for incontinence Q2 hours and as needed, Maintain skin hydration (lotion/cream), Minimize layers, Moisture barrier, Apply protective barrier, creams and emollients    Activity Interventions: Increase time out of bed, Pressure redistribution bed/mattress(bed type), PT/OT evaluation    Mobility Interventions: Float heels, HOB 30 degrees or less, PT/OT evaluation, Pressure redistribution bed/mattress (bed type), Turn and reposition approx.  every two hours(pillow and wedges)    Nutrition Interventions: Document food/fluid/supplement intake    Friction and Shear Interventions: Feet elevated on foot rest, Apply protective barrier, creams and emollients, HOB 30 degrees or less, Lift sheet, Lift team/patient mobility team, Minimize layers                Problem: Urinary Tract Infection - Adult  Goal: *Absence of infection signs and symptoms  Outcome: Progressing Towards Goal

## 2020-11-23 NOTE — PROGRESS NOTES
Spiritual Care Assessment/Progress Note  [facility]      NAME: Cece Caldera      MRN: [id number]  AGE: 78 y.o. SEX: female  Zoroastrian Affiliation: No Mandaen   Language: English     11/23/2020     Total Time (in minutes): 5     Spiritual Assessment begun in [facility] through conversation with:         []Patient        [] Family    [] Friend(s)        Reason for Consult: Palliative Care, Initial/Spiritual Assessment     Spiritual beliefs: (Please include comment if needed)     [] Identifies with a ion tradition:         [] Supported by a ion community:            [] Claims no spiritual orientation:           [] Seeking spiritual identity:                [] Adheres to an individual form of spirituality:           [x] Not able to assess:  Pt resting                         Identified resources for coping:      [] Prayer                               [] Music                  [] Guided Imagery     [] Family/friends                 [] Pet visits     [] Devotional reading                         [x] Unknown     [] Other                                             Interventions offered during this visit: (See comments for more details)    Patient Interventions: Initial visit           Plan of Care:     [] Support spiritual and/or cultural needs    [] Support AMD and/or advance care planning process      [] Support grieving process   [] Coordinate Rites and/or Rituals    [] Coordination with community clergy   [] No spiritual needs identified at this time   [] Detailed Plan of Care below (See Comments)  [] Make referral to Music Therapy  [] Make referral to Pet Therapy     [] Make referral to Addiction services  [] Make referral to [facility]  [] Make referral to Spiritual Care Partner  [] No future visits requested        [x] Follow up visits as needed     Comments: Attempted visit with Ms. Kev Mcdonald. Pt was resting; no family currently at bedside.   Chaplains are available for support as needed.     Caroline Damico, Palliative
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
## 2020-11-24 LAB
ALBUMIN SERPL-MCNC: 2.2 G/DL (ref 3.5–5)
ALBUMIN/GLOB SERPL: 0.5 {RATIO} (ref 1.1–2.2)
ALP SERPL-CCNC: 82 U/L (ref 45–117)
ALT SERPL-CCNC: 14 U/L (ref 12–78)
ANION GAP SERPL CALC-SCNC: 11 MMOL/L (ref 5–15)
AST SERPL-CCNC: 14 U/L (ref 15–37)
ATRIAL RATE: 138 BPM
BASOPHILS # BLD: 0 K/UL (ref 0–0.1)
BASOPHILS NFR BLD: 0 % (ref 0–1)
BILIRUB SERPL-MCNC: 0.2 MG/DL (ref 0.2–1)
BUN SERPL-MCNC: 34 MG/DL (ref 6–20)
BUN/CREAT SERPL: 29 (ref 12–20)
CALCIUM SERPL-MCNC: 8.7 MG/DL (ref 8.5–10.1)
CALCULATED R AXIS, ECG10: -16 DEGREES
CALCULATED T AXIS, ECG11: 163 DEGREES
CHLORIDE SERPL-SCNC: 109 MMOL/L (ref 97–108)
CO2 SERPL-SCNC: 19 MMOL/L (ref 21–32)
CREAT SERPL-MCNC: 1.19 MG/DL (ref 0.55–1.02)
DIAGNOSIS, 93000: NORMAL
DIFFERENTIAL METHOD BLD: ABNORMAL
EOSINOPHIL # BLD: 0 K/UL (ref 0–0.4)
EOSINOPHIL NFR BLD: 0 % (ref 0–7)
ERYTHROCYTE [DISTWIDTH] IN BLOOD BY AUTOMATED COUNT: 16.8 % (ref 11.5–14.5)
GLOBULIN SER CALC-MCNC: 4.3 G/DL (ref 2–4)
GLUCOSE BLD STRIP.AUTO-MCNC: 152 MG/DL (ref 65–100)
GLUCOSE BLD STRIP.AUTO-MCNC: 216 MG/DL (ref 65–100)
GLUCOSE BLD STRIP.AUTO-MCNC: 223 MG/DL (ref 65–100)
GLUCOSE BLD STRIP.AUTO-MCNC: 267 MG/DL (ref 65–100)
GLUCOSE SERPL-MCNC: 170 MG/DL (ref 65–100)
HCT VFR BLD AUTO: 34.1 % (ref 35–47)
HGB BLD-MCNC: 9.7 G/DL (ref 11.5–16)
IMM GRANULOCYTES # BLD AUTO: 0.2 K/UL (ref 0–0.04)
IMM GRANULOCYTES NFR BLD AUTO: 1 % (ref 0–0.5)
LYMPHOCYTES # BLD: 1.2 K/UL (ref 0.8–3.5)
LYMPHOCYTES NFR BLD: 5 % (ref 12–49)
MAGNESIUM SERPL-MCNC: 2.3 MG/DL (ref 1.6–2.4)
MCH RBC QN AUTO: 22.9 PG (ref 26–34)
MCHC RBC AUTO-ENTMCNC: 28.4 G/DL (ref 30–36.5)
MCV RBC AUTO: 80.4 FL (ref 80–99)
MONOCYTES # BLD: 2 K/UL (ref 0–1)
MONOCYTES NFR BLD: 8 % (ref 5–13)
NEUTS SEG # BLD: 21 K/UL (ref 1.8–8)
NEUTS SEG NFR BLD: 86 % (ref 32–75)
NRBC # BLD: 0 K/UL (ref 0–0.01)
NRBC BLD-RTO: 0 PER 100 WBC
PLATELET # BLD AUTO: 452 K/UL (ref 150–400)
PLATELET COMMENTS,PCOM: ABNORMAL
PMV BLD AUTO: 9.1 FL (ref 8.9–12.9)
POTASSIUM SERPL-SCNC: 2.9 MMOL/L (ref 3.5–5.1)
PROT SERPL-MCNC: 6.5 G/DL (ref 6.4–8.2)
Q-T INTERVAL, ECG07: 308 MS
QRS DURATION, ECG06: 88 MS
QTC CALCULATION (BEZET), ECG08: 479 MS
RBC # BLD AUTO: 4.24 M/UL (ref 3.8–5.2)
RBC MORPH BLD: ABNORMAL
SERVICE CMNT-IMP: ABNORMAL
SODIUM SERPL-SCNC: 139 MMOL/L (ref 136–145)
VENTRICULAR RATE, ECG03: 146 BPM
WBC # BLD AUTO: 24.4 K/UL (ref 3.6–11)

## 2020-11-24 PROCEDURE — C9113 INJ PANTOPRAZOLE SODIUM, VIA: HCPCS | Performed by: HOSPITALIST

## 2020-11-24 PROCEDURE — 36415 COLL VENOUS BLD VENIPUNCTURE: CPT

## 2020-11-24 PROCEDURE — 83735 ASSAY OF MAGNESIUM: CPT

## 2020-11-24 PROCEDURE — 74011250637 HC RX REV CODE- 250/637: Performed by: NURSE PRACTITIONER

## 2020-11-24 PROCEDURE — 74011636637 HC RX REV CODE- 636/637: Performed by: HOSPITALIST

## 2020-11-24 PROCEDURE — 74011250636 HC RX REV CODE- 250/636: Performed by: NURSE PRACTITIONER

## 2020-11-24 PROCEDURE — 80053 COMPREHEN METABOLIC PANEL: CPT

## 2020-11-24 PROCEDURE — 74011250636 HC RX REV CODE- 250/636: Performed by: HOSPITALIST

## 2020-11-24 PROCEDURE — 82962 GLUCOSE BLOOD TEST: CPT

## 2020-11-24 PROCEDURE — 85025 COMPLETE CBC W/AUTO DIFF WBC: CPT

## 2020-11-24 PROCEDURE — 99233 SBSQ HOSP IP/OBS HIGH 50: CPT | Performed by: NURSE PRACTITIONER

## 2020-11-24 PROCEDURE — 65270000029 HC RM PRIVATE

## 2020-11-24 PROCEDURE — 74011250637 HC RX REV CODE- 250/637: Performed by: HOSPITALIST

## 2020-11-24 PROCEDURE — 74011000250 HC RX REV CODE- 250: Performed by: NURSE PRACTITIONER

## 2020-11-24 PROCEDURE — 74011000250 HC RX REV CODE- 250: Performed by: HOSPITALIST

## 2020-11-24 RX ORDER — POTASSIUM CHLORIDE 14.9 MG/ML
10 INJECTION INTRAVENOUS
Status: COMPLETED | OUTPATIENT
Start: 2020-11-24 | End: 2020-11-24

## 2020-11-24 RX ORDER — METOPROLOL TARTRATE 5 MG/5ML
2.5 INJECTION INTRAVENOUS ONCE
Status: COMPLETED | OUTPATIENT
Start: 2020-11-24 | End: 2020-11-24

## 2020-11-24 RX ADMIN — POTASSIUM CHLORIDE 10 MEQ: 14.9 INJECTION, SOLUTION INTRAVENOUS at 07:09

## 2020-11-24 RX ADMIN — INSULIN LISPRO 5 UNITS: 100 INJECTION, SOLUTION INTRAVENOUS; SUBCUTANEOUS at 12:35

## 2020-11-24 RX ADMIN — METOPROLOL TARTRATE 12.5 MG: 25 TABLET, FILM COATED ORAL at 18:15

## 2020-11-24 RX ADMIN — PREDNISONE 5 MG: 5 TABLET ORAL at 09:32

## 2020-11-24 RX ADMIN — ACETAMINOPHEN 650 MG: 325 TABLET ORAL at 09:32

## 2020-11-24 RX ADMIN — METOPROLOL TARTRATE 12.5 MG: 25 TABLET, FILM COATED ORAL at 05:09

## 2020-11-24 RX ADMIN — Medication 1 CAPSULE: at 09:33

## 2020-11-24 RX ADMIN — CYANOCOBALAMIN TAB 500 MCG 500 MCG: 500 TAB at 09:33

## 2020-11-24 RX ADMIN — POTASSIUM CHLORIDE 10 MEQ: 14.9 INJECTION, SOLUTION INTRAVENOUS at 04:45

## 2020-11-24 RX ADMIN — Medication 1 TABLET: at 18:16

## 2020-11-24 RX ADMIN — METOROPROLOL TARTRATE 2.5 MG: 5 INJECTION, SOLUTION INTRAVENOUS at 01:36

## 2020-11-24 RX ADMIN — POTASSIUM CHLORIDE 10 MEQ: 14.9 INJECTION, SOLUTION INTRAVENOUS at 05:40

## 2020-11-24 RX ADMIN — INSULIN LISPRO 3 UNITS: 100 INJECTION, SOLUTION INTRAVENOUS; SUBCUTANEOUS at 18:14

## 2020-11-24 RX ADMIN — Medication 1 TABLET: at 09:33

## 2020-11-24 RX ADMIN — SODIUM CHLORIDE 40 MG: 9 INJECTION INTRAMUSCULAR; INTRAVENOUS; SUBCUTANEOUS at 20:09

## 2020-11-24 RX ADMIN — QUETIAPINE FUMARATE 25 MG: 25 TABLET ORAL at 21:18

## 2020-11-24 RX ADMIN — POTASSIUM CHLORIDE 10 MEQ: 14.9 INJECTION, SOLUTION INTRAVENOUS at 06:15

## 2020-11-24 RX ADMIN — DONEPEZIL HYDROCHLORIDE 10 MG: 10 TABLET, FILM COATED ORAL at 21:18

## 2020-11-24 RX ADMIN — SODIUM CHLORIDE 40 MG: 9 INJECTION INTRAMUSCULAR; INTRAVENOUS; SUBCUTANEOUS at 09:34

## 2020-11-24 NOTE — PROGRESS NOTES
Hospitalist Cross Coverage NP     Name: Marvin Toscano  YOB: 1940  MRN: [id number]  Admission Date: 11/20/2020 10:46 AM    Date of service: 11/24/2020 3:02 AM                                Overnight update:        Paged by RN -  atrial fibrillation RVR  - Hx of same earlier in visit, converted after cardizem gtt and digoxin push  - BP stable - 120/78   - 2.5mg metoprolol given, rate [address] FNP-C, PA-C  [phone] or TigerText
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
## 2020-11-24 NOTE — PROGRESS NOTES
Bedside and Verbal shift change report given to [address] [place] [address] (oncoming nurse) by Sha Escalera (offgoing nurse). Report included the following information SBAR, Kardex, Intake/Output, MAR, Recent Results, Cardiac Rhythm NSR/AFIB and Dual Neuro Assessment.

## 2020-11-24 NOTE — ROUTINE PROCESS
0050: Patient spontaneously in Afib c RVR similar to previous episode on 11/22. Patient sustaining -145. RN wakes patient. Patient denies distress, SOB, etc. RN contacts night time hospitalist via [address] [place] [address]. 0100: RN to draw labs for possible electrolyte imbalances. Continue to monitor BP. 
 
0115: Patient rests when undisturbed. Denies pain currently. HR continues in Afib c RVR. 0120: Provider responding in person. Discussed comfort care v HR control and patient goals. IV metoprolol injection to be given. 0140: IV metoprolol 2.5 mg given. Continue to monitor patient HR. 
 
0230: Patient HR in Afib/SA, rate better controlled 90s-110s, BP stable. Patient resting comfortably. 0400: Replacement potassium ordered for K 2.9. 40 mEq IV ordered for infusion. 0500: Infusing potassium. HR approx 120-130. PO metoprolol dose for AM given.

## 2020-11-24 NOTE — PROGRESS NOTES
Problem: Falls - Risk of  Goal: *Absence of Falls  Description: Document Sheila Singer Fall Risk and appropriate interventions in the flowsheet. Outcome: Progressing Towards Goal  Note: Fall Risk Interventions:  Mobility Interventions: Bed/chair exit alarm, Communicate number of staff needed for ambulation/transfer, Patient to call before getting OOB, Strengthening exercises (ROM-active/passive), Utilize walker, cane, or other assistive device, Utilize gait belt for transfers/ambulation    Mentation Interventions: Adequate sleep, hydration, pain control, Bed/chair exit alarm, Door open when patient unattended, Evaluate medications/consider consulting pharmacy, Familiar objects from home, Family/sitter at bedside, Gait belt with transfers/ambulation, Increase mobility, More frequent rounding, Reorient patient, Room close to nurse's station, Toileting rounds, Update white board    Medication Interventions: Assess postural VS orthostatic hypotension, Bed/chair exit alarm, Evaluate medications/consider consulting pharmacy, Patient to call before getting OOB, Teach patient to arise slowly, Utilize gait belt for transfers/ambulation    Elimination Interventions: Bed/chair exit alarm, Call light in reach, Patient to call for help with toileting needs, Stay With Me (per policy), Toilet paper/wipes in reach, Toileting schedule/hourly rounds              Problem: Patient Education: Go to Patient Education Activity  Goal: Patient/Family Education  Outcome: Progressing Towards Goal     Problem: Pressure Injury - Risk of  Goal: *Prevention of pressure injury  Description: Document Elieser Scale and appropriate interventions in the flowsheet.   Outcome: Progressing Towards Goal  Note: Pressure Injury Interventions:  Sensory Interventions: Assess changes in LOC, Assess need for specialty bed, Avoid rigorous massage over bony prominences, Check visual cues for pain, Float heels, Keep linens dry and wrinkle-free, Maintain/enhance activity level, Minimize linen layers, Monitor skin under medical devices, Pressure redistribution bed/mattress (bed type), Sit a 90-degree angle/use footstool if needed, Turn and reposition approx. every two hours (pillows and wedges if needed), Use 30-degree side-lying position    Moisture Interventions: Absorbent underpads, Assess need for specialty bed, Apply protective barrier, creams and emollients, Check for incontinence Q2 hours and as needed, Maintain skin hydration (lotion/cream), Minimize layers, Moisture barrier, Offer toileting Q_hr    Activity Interventions: Assess need for specialty bed, Increase time out of bed, Pressure redistribution bed/mattress(bed type)    Mobility Interventions: Assess need for specialty bed, Float heels, HOB 30 degrees or less, Pressure redistribution bed/mattress (bed type), Turn and reposition approx.  every two hours(pillow and wedges)    Nutrition Interventions: Document food/fluid/supplement intake, Discuss nutritional consult with provider, Offer support with meals,snacks and hydration    Friction and Shear Interventions: Apply protective barrier, creams and emollients, Feet elevated on foot rest, HOB 30 degrees or less, Lift sheet, Minimize layers, Sit at 90-degree angle                Problem: Patient Education: Go to Patient Education Activity  Goal: Patient/Family Education  Outcome: Progressing Towards Goal     Problem: Urinary Tract Infection - Adult  Goal: *Absence of infection signs and symptoms  Outcome: Progressing Towards Goal     Problem: Patient Education: Go to Patient Education Activity  Goal: Patient/Family Education  Outcome: Progressing Towards Goal     Problem: Arrhythmia Pathway (Adult)  Goal: *Absence of arrhythmia  Outcome: Progressing Towards Goal     Problem: Patient Education: Go to Patient Education Activity  Goal: Patient/Family Education  Outcome: Progressing Towards Goal     Problem: Pain  Goal: *Control of acute pain  Outcome: Progressing Towards Goal     Problem: Pressure Injury - Risk of  Goal: *Prevention of pressure injury  Outcome: Progressing Towards Goal     Problem: Discharge Planning  Goal: *Participates in discharge planning  Outcome: Progressing Towards Goal     Problem: Patient Education: Go to Patient Education Activity  Goal: Patient/Family Education  Outcome: Progressing Towards Goal

## 2020-11-24 NOTE — PROGRESS NOTES
1040 - Transition of Care  (SAMUEL) Plan:        RUR:  23 %       LOS: 4 days    GLOS: X.X     Dx: Hospice         Notes:     --  CM spoke with daughter Paul Monroy - [phone] and she is aware of her mother returning to [facility] tomorrow am.  Update to 6S Nurse - Ez Umanzor RN and Dr. Leonard Monterroso via [employer]. Disposition:   Back home to Hospice @ [facility]. Transport:     [facility] - [phone] @ 9:00a. Transport packet on chart. 1440 - Referral to [facility] ( for transport tomorrow. .Allscripts Alert: YES [facility]: Referral [id number] for patient in [facility] 6S Neuro-Sci [id number]: Yes, willing to accept patient 11/25/2020    1430 - 5[address] [facility] aware patient will be returning tomorrow from conversation with daughter today. 1200   CM sent Allscripts to [address] [place] [address] - [phone] and At [address]  [phone].     1120 - CM spoke with daughter Paul Monroy - [phone] @ [facility]. They prefer At [facility] (she lives at [facility] and per daughter they are aware of new Hospice status). At [address] accepted patient. They will arrange for hospital equipment to be delivered tonight. CM will continue to follow and assist with SAMUEL needs as they arise. Available on [employer]. Shonda  MSN, [facility],  RN, [facility] - [phone].

## 2020-11-24 NOTE — PROGRESS NOTES
[address]  [address] [facility], [address]       GI PROGRESS NOTE  Kevyn Shelton, [facility] office  [phone] NP/PA in-hospital cell phone M-F until 4:30PM  After 5PM or on weekends, please call  for physician on call      NAME: Dionisio Isabel   :  1940   MRN:  [id number]       Subjective:    She does not feel well - no specific complaints. Objective:     VITALS:   Last 24hrs VS reviewed since prior progress note. Most recent are:  Visit Vitals  BP 99/71   Pulse (!) 115   Temp 98.3 °F (36.8 °C)   Resp 24   Wt 85.6 kg (188 lb 11.2 oz)   SpO2 94%   BMI 30.46 kg/m²       PHYSICAL EXAM:  General: No acute distress    Neurologic:  alert  HEENT: No scleral icterus   Lungs:  No respiratory distress  Heart:  S1 S2  Abdomen: Soft, distended, mild generalized tenderness, no guarding, no rebound. + Bowel sounds.    Extremities: Warm  Psych:   Not anxious or agitated    Lab Data Reviewed:     Recent Results (from the past 24 hour(s))   GLUCOSE, POC    Collection Time: 20 11:32 AM   Result Value Ref Range    Glucose (POC) 155 (H) 65 - 100 mg/dL    Performed by Kristian Morfin    GLUCOSE, POC    Collection Time: 20  5:04 PM   Result Value Ref Range    Glucose (POC) 137 (H) 65 - 100 mg/dL    Performed by [facility], POC    Collection Time: 20 11:34 PM   Result Value Ref Range    Glucose (POC) 204 (H) 65 - 100 mg/dL    Performed by Jose Juan Cotto    CBC WITH AUTOMATED DIFF    Collection Time: 20  1:16 AM   Result Value Ref Range    WBC 24.4 (H) 3.6 - 11.0 K/uL    RBC 4.24 3.80 - 5.20 M/uL    HGB 9.7 (L) 11.5 - 16.0 g/dL    HCT 34.1 (L) 35.0 - 47.0 %    MCV 80.4 80.0 - 99.0 FL    MCH 22.9 (L) 26.0 - 34.0 PG    MCHC 28.4 (L) 30.0 - 36.5 g/dL    RDW 16.8 (H) 11.5 - 14.5 %    PLATELET 964 (H) 600 - 400 K/uL    MPV 9.1 8.9 - 12.9 FL    NRBC 0.0 0  WBC    ABSOLUTE NRBC 0.00 0.00 - 0.01 K/uL    NEUTROPHILS 86 (H) 32 - 75 % LYMPHOCYTES 5 (L) 12 - 49 %    MONOCYTES 8 5 - 13 %    EOSINOPHILS 0 0 - 7 %    BASOPHILS 0 0 - 1 %    IMMATURE GRANULOCYTES 1 (H) 0.0 - 0.5 %    ABS. NEUTROPHILS 21.0 (H) 1.8 - 8.0 K/UL    ABS. LYMPHOCYTES 1.2 0.8 - 3.5 K/UL    ABS. MONOCYTES 2.0 (H) 0.0 - 1.0 K/UL    ABS. EOSINOPHILS 0.0 0.0 - 0.4 K/UL    ABS. BASOPHILS 0.0 0.0 - 0.1 K/UL    ABS. IMM. GRANS. 0.2 (H) 0.00 - 0.04 K/UL    DF SMEAR SCANNED      PLATELET COMMENTS Large Platelets      RBC COMMENTS HYPOCHROMIA  2+        RBC COMMENTS ANISOCYTOSIS  1+        RBC COMMENTS MICROCYTOSIS  1+        RBC COMMENTS OVALOCYTES  1+        RBC COMMENTS POLYCHROMASIA  1+        RBC COMMENTS SCHISTOCYTES  1+       MAGNESIUM    Collection Time: 11/24/20  1:16 AM   Result Value Ref Range    Magnesium 2.3 1.6 - 2.4 mg/dL   METABOLIC PANEL, COMPREHENSIVE    Collection Time: 11/24/20  1:16 AM   Result Value Ref Range    Sodium 139 136 - 145 mmol/L    Potassium 2.9 (L) 3.5 - 5.1 mmol/L    Chloride 109 (H) 97 - 108 mmol/L    CO2 19 (L) 21 - 32 mmol/L    Anion gap 11 5 - 15 mmol/L    Glucose 170 (H) 65 - 100 mg/dL    BUN 34 (H) 6 - 20 MG/DL    Creatinine 1.19 (H) 0.55 - 1.02 MG/DL    BUN/Creatinine ratio 29 (H) 12 - 20      GFR est AA 53 (L) >60 ml/min/1.73m2    GFR est non-AA 43 (L) >60 ml/min/1.73m2    Calcium 8.7 8.5 - 10.1 MG/DL    Bilirubin, total 0.2 0.2 - 1.0 MG/DL    ALT (SGPT) 14 12 - 78 U/L    AST (SGOT) 14 (L) 15 - 37 U/L    Alk.  phosphatase 82 45 - 117 U/L    Protein, total 6.5 6.4 - 8.2 g/dL    Albumin 2.2 (L) 3.5 - 5.0 g/dL    Globulin 4.3 (H) 2.0 - 4.0 g/dL    A-G Ratio 0.5 (L) 1.1 - 2.2     GLUCOSE, POC    Collection Time: 11/24/20  5:27 AM   Result Value Ref Range    Glucose (POC) 152 (H) 65 - 100 mg/dL    Performed by Leslie Huerta        Assessment:   · Obstructing inflamed/infected area in sigmoid colon seen on limited CT scan without contrast: WBC 24.4, Hgb 9.7      Patient Active Problem List   Diagnosis Code    Polymyalgia rheumatica ([facility] [address] M35.3    Long term (current) use of systemic steroids Z79.52    Diabetes mellitus, type 2 (HCC) E11.9    Hypercholesterolemia E78.00    Osteopenia M85.80    Alzheimer's dementia ([facility] [address] G30.9, F02.80    Fall W19. XXXA    Abdominal mass R19.00    Osteoarthritis M19.90    HTN, goal below 140/90 I10    History of DVT (deep vein thrombosis) Z86.718    Burn T30.0    Osteomyelitis ([facility]) M86.9    Unspecified adverse effect of anesthesia T41.45XA    Dehydration E86.0    Leukocytosis D72.829    Acute ischemic stroke (HCC) I63.9    Left hemiparesis (HCC) G81.94    Torsades de pointes (HCC) I47.2    UTI (urinary tract infection) N39.0     Plan:   · On antibiotics  · Plans for comfort measures, will be available on request      Signed By: Ambar Hammond NP     11/24/2020  10:38 AM    Agree with above  Will sign off

## 2020-11-24 NOTE — PROGRESS NOTES
[address] Adult  Hospitalist Group                                                                                          Hospitalist Progress Note  Hannah Meehan MD  Answering service: [phone] from in house phone        Date of Service:  2020  NAME:  Tanika Cordova  :  1940  MRN:  [id number]      Admission Summary: This is an 38-year-old  female with past medical history significant for Alzheimer's dementia, polymyalgia rheumatica, history of stroke 3 years ago, hypertension, sinus bradycardia, and type 2 diabetes mellitus, who is brought to [facility] Emergency Department from [facility] after the patient experienced vomiting and increasing confusion. The patient with Alzheimer's dementia and poor historian. Most of the information is obtained from her daughter at the bedside. She stated that she has nausea and threw up multiple times since this morning associated and holding her hand on her abdomen. She has also multiple loose stool bowel movement. Her daughter reported that the patient has been slow for the last few days. On baseline, the patient engaged in some conversation and ambulates with a walker. No hematemesis, hematochezia, fever, chills, sweating, cough, contact with COVID-19, denied left-sided chest pain, palpitation or breathing difficulties.        Interval history / Subjective:     Patient with dementia, poor historian, per her nurse has liquid bowel movement, no left side chest pain or shortness of breath, HR improved and off her cardizem gtt at 4 am,      Assessment & Plan:     E Coli UTI POA  -continue ceftriaxone and IVF  -has leukocytosis, afebrile   -urine cx grow E coli  -blood cx no growth    Paroxysmal A Fib with RVR, now NSR  -off cardizem gtt on  at 4 am, on metoprolol 12.5 mg bid  -echo pending  -mg normal  -cardiologist is on board    Acute metabolic encephalopathy with history of dementia. -likely due to UTI  -patient is conscious and alert, answer simple questions and follows simple command  -continue neuro check and supportive care    Colonic obstruction, possibly due to possible neoplasia. -started on mechanical soft , continue normal saline   -Abdomen distended, has liquid bowel movement but no vomiting  -CTA abdomen pelvis persistent colon obstruction likely due to sigmoid neoplasia with  slightly worsened colon distention and development of small ascites. -daughter at bedside and she said she will agree to have her mother if intervention or surgery indicated  -GI and surgical service on board    Anemia of chronic disease.  -stable, no evidence of bleeding  -monitor H/H    HTN poorly controlled. -BP  Normal, on prn iv hydralazine, monitor BP    T2DM with hyperglycemia. -A1c 7.3  -finger stick glucose 145-200 overnight, 145 this morning  -patient NPO, hold lantus, monitor finger stick glucose on sliding scale    Hx of polymyalgia rheumatica. -stable, continue prednisone     Hx of stroke. -stable, plavix is held for now    Dementia. -stable  -conscious and alert, oriented to self  -continue home aricept q hs        Code status: DNR  DVT prophylaxis:SCD    Care Plan discussed with: Patient/Family, Nurse and   Anticipated Disposition:  PT, OT, RN  Anticipated Discharge: Greater than 48 hours     Hospital Problems  Date Reviewed: 11/22/2020          Codes Class Noted POA    UTI (urinary tract infection) ICD-10-CM: N39.0  ICD-9-CM: 599.0  11/20/2020 Unknown                 Vital Signs:    Last 24hrs VS reviewed since prior progress note.  Most recent are:  Visit Vitals  BP (!) 115/59 (BP 1 Location: Left arm, BP Patient Position: At rest)   Pulse 66   Temp 97.9 °F (36.6 °C)   Resp 21   Wt 85.6 kg (188 lb 11.2 oz)   SpO2 94%   BMI 30.46 kg/m²       No intake or output data in the 24 hours ending 11/24/20 1113     Physical Examination:     I had a face to face encounter with this patient and independently examined them on 11/24/2020 as outlined below:          Constitutional:  No acute distress, cooperative, pleasant    ENT:  Oral mucosa moist, oropharynx benign. Resp:  CTA bilaterally. No wheezing/rhonchi/rales. No accessory muscle use   CV:  Regular rhythm, normal rate, no murmurs, gallops, rubs    GI:  Abdomen distended, non tender. normoactive bowel sounds, no hepatosplenomegaly     Musculoskeletal:  No edema     Neurologic:  Conscious and alert, oriented to self, follows simple command, moves all extremities, CN II-XII reviewed     Skin:  Good turgor, no rashes or ulcers       Data Review:    Review and/or order of clinical lab test  Review and/or order of tests in the radiology section of CPT  Review and/or order of tests in the medicine section of CPT      Labs:     Recent Labs     11/24/20 0116 11/23/20  0051   WBC 24.4* 15.1*   HGB 9.7* 9.0*   HCT 34.1* 32.3*   * 468*     Recent Labs     11/24/20 0116 11/23/20 0051 11/22/20  0251    142 139   K 2.9* 3.7 4.3   * 113* 113*   CO2 19* 19* 19*   BUN 34* 34* 29*   CREA 1.19* 1.06* 0.96   * 192* 160*   CA 8.7 8.4* 8.3*   MG 2.3 2.1  --      Recent Labs     11/24/20 0116 11/23/20 0051 11/22/20  0251   ALT 14 14 10*   AP 82 68 74   TBILI 0.2 0.3 0.3   TP 6.5 6.0* 6.3*   ALB 2.2* 2.3* 2.4*   GLOB 4.3* 3.7 3.9     No results for input(s): INR, PTP, APTT, INREXT, INREXT in the last 72 hours. No results for input(s): FE, TIBC, PSAT, FERR in the last 72 hours. No results found for: FOL, RBCF   No results for input(s): PH, PCO2, PO2 in the last 72 hours. No results for input(s): CPK, CKNDX, TROIQ in the last 72 hours.     No lab exists for component: [facility]  Lab Results   Component Value Date/Time    Cholesterol, total 181 01/30/2018 03:35 AM    HDL Cholesterol 72 01/30/2018 03:35 AM    LDL, calculated 94.6 01/30/2018 03:35 AM    Triglyceride 72 01/30/2018 03:35 AM    CHOL/HDL Ratio 2.5 01/30/2018 03:35 AM     Lab Results   Component Value Date/Time    Glucose (POC) 152 (H) 11/24/2020 05:27 AM    Glucose (POC) 204 (H) 11/23/2020 11:34 PM    Glucose (POC) 137 (H) 11/23/2020 05:04 PM    Glucose (POC) 155 (H) 11/23/2020 11:32 AM    Glucose (POC) 170 (H) 11/23/2020 05:22 AM     Lab Results   Component Value Date/Time    Color YELLOW/STRAW 11/20/2020 12:16 PM    Appearance CLOUDY (A) 11/20/2020 12:16 PM    Specific gravity 1.025 11/20/2020 12:16 PM    pH (UA) 5.0 11/20/2020 12:16 PM    Protein TRACE (A) 11/20/2020 12:16 PM    Glucose >1,000 (A) 11/20/2020 12:16 PM    Ketone TRACE (A) 11/20/2020 12:16 PM    Bilirubin Negative 11/20/2020 12:16 PM    Urobilinogen 0.2 11/20/2020 12:16 PM    Nitrites Positive (A) 11/20/2020 12:16 PM    Leukocyte Esterase SMALL (A) 11/20/2020 12:16 PM    Epithelial cells FEW 11/20/2020 12:16 PM    Bacteria 3+ (A) 11/20/2020 12:16 PM    WBC  11/20/2020 12:16 PM    RBC 0-5 11/20/2020 12:16 PM         Medications Reviewed:     Current Facility-Administered Medications   Medication Dose Route Frequency    metoprolol tartrate (LOPRESSOR) tablet 12.5 mg  12.5 mg Oral BID    haloperidol lactate (HALDOL) injection 3 mg  3 mg IntraVENous Q4H PRN    morphine injection 2 mg  2 mg IntraVENous Q5MIN PRN    donepeziL (ARICEPT) tablet 10 mg  10 mg Oral QHS    acetaminophen (TYLENOL) tablet 650 mg  650 mg Oral Q6H PRN    calcium-vitamin D (OS-GENOVEVA +D3) 500 mg-200 unit per tablet 1 Tab  1 Tab Oral BID    cyanocobalamin (VITAMIN B12) tablet 500 mcg  500 mcg Oral DAILY    lactobac ac& pc-s.therm-b.anim (TEJ Q/RISAQUAD)  1 Cap Oral DAILY    loratadine (CLARITIN) tablet 10 mg  10 mg Oral DAILY PRN    predniSONE (DELTASONE) tablet 5 mg  5 mg Oral DAILY WITH BREAKFAST    sodium chloride (NS) flush 5-40 mL  5-40 mL IntraVENous PRN    insulin lispro (HUMALOG) injection   SubCUTAneous Q6H    glucose chewable tablet 16 g  4 Tab Oral PRN    glucagon (GLUCAGEN) injection 1 mg  1 mg IntraMUSCular PRN    hydrALAZINE (APRESOLINE) 20 mg/mL injection 10 mg  10 mg IntraVENous Q6H PRN    pantoprazole (PROTONIX) 40 mg in 0.9% sodium chloride 10 mL injection  40 mg IntraVENous Q12H    ondansetron (ZOFRAN) injection 4 mg  4 mg IntraVENous Q6H PRN    QUEtiapine (SEROquel) tablet 25 mg  25 mg Oral QHS PRN    dextrose 10 % infusion 125-250 mL  125-250 mL IntraVENous PRN     ______________________________________________________________________  EXPECTED LENGTH OF STAY: - - -  ACTUAL LENGTH OF STAY:          4                 Carlin Ravi MD

## 2020-11-25 VITALS
OXYGEN SATURATION: 94 % | HEART RATE: 68 BPM | RESPIRATION RATE: 23 BRPM | DIASTOLIC BLOOD PRESSURE: 59 MMHG | SYSTOLIC BLOOD PRESSURE: 129 MMHG | WEIGHT: 189.9 LBS | TEMPERATURE: 98.2 F | BODY MASS INDEX: 30.65 KG/M2

## 2020-11-25 LAB
BACTERIA SPEC CULT: NORMAL
GLUCOSE BLD STRIP.AUTO-MCNC: 194 MG/DL (ref 65–100)
SERVICE CMNT-IMP: ABNORMAL
SERVICE CMNT-IMP: NORMAL

## 2020-11-25 PROCEDURE — 82962 GLUCOSE BLOOD TEST: CPT

## 2020-11-25 PROCEDURE — 74011636637 HC RX REV CODE- 636/637: Performed by: HOSPITALIST

## 2020-11-25 RX ORDER — QUETIAPINE FUMARATE 25 MG/1
25 TABLET, FILM COATED ORAL
Qty: 30 TAB | Refills: 0 | Status: SHIPPED | OUTPATIENT
Start: 2020-11-25

## 2020-11-25 RX ORDER — METOPROLOL TARTRATE 25 MG/1
12.5 TABLET, FILM COATED ORAL 2 TIMES DAILY
Qty: 60 TAB | Refills: 0 | Status: SHIPPED | OUTPATIENT
Start: 2020-11-25

## 2020-11-25 RX ADMIN — INSULIN LISPRO 2 UNITS: 100 INJECTION, SOLUTION INTRAVENOUS; SUBCUTANEOUS at 05:42

## 2020-11-25 RX ADMIN — INSULIN LISPRO 3 UNITS: 100 INJECTION, SOLUTION INTRAVENOUS; SUBCUTANEOUS at 00:35

## 2020-11-25 NOTE — PROGRESS NOTES
This pt is being discharged to [address] [place] [address] with [facility]. CM spoke with Roge Spring from [facility] and she confirmed that pt's DME has been delivered.  Rachel Wilder

## 2020-11-25 NOTE — DISCHARGE INSTRUCTIONS
Discharge Instructions       PATIENT ID: Cari Oliva  MRN: [id number]   YOB: 1940    DATE OF ADMISSION: 11/20/2020 10:46 AM    DATE OF DISCHARGE: 11/25/2020    PRIMARY CARE PROVIDER: Feliz Melo MD     ATTENDING PHYSICIAN: Shanice Welch MD  DISCHARGING PROVIDER: Tommy Kong MD    To contact this individual call [phone] and ask the  to page. If unavailable ask to be transferred the Adult Hospitalist Department. DISCHARGE DIAGNOSES   E Coli UTI POA  Paroxysmal A Fib with RVR, now NSR  Acute metabolic encephalopathy with history of dementia. Colonic obstruction, possibly due to possible neoplasia. Anemia of chronic disease. HTN poorly controlled. T2DM with hyperglycemia. Hx of polymyalgia rheumatica. Hx of stroke. Dementia. CONSULTATIONS: IP CONSULT TO GENERAL SURGERY  IP CONSULT TO GASTROENTEROLOGY  IP CONSULT TO PALLIATIVE CARE - PROVIDER  IP CONSULT TO CARDIOLOGY    PROCEDURES/SURGERIES: * No surgery found *    PENDING TEST RESULTS:   At the time of discharge the following test results are still pending: None    FOLLOW UP APPOINTMENTS:   Follow-up Information     Follow up With Specialties Details Why Contact Info   1. Feliz Melo MD Family Medicine In one week   [place] [address]  [phone]        2. Home hospice care     ADDITIONAL CARE RECOMMENDATIONS:      DIET: Regular Diet       ACTIVITY: Activity as tolerated    WOUND CARE: None    EQUIPMENT needed: None    DISCHARGE MEDICATIONS:   See Medication Reconciliation Form    · It is important that you take the medication exactly as they are prescribed. · Keep your medication in the bottles provided by the pharmacist and keep a list of the medication names, dosages, and times to be taken in your wallet. · Do not take other medications without consulting your doctor.        NOTIFY YOUR PHYSICIAN FOR ANY OF THE FOLLOWING:   Fever over 101 degrees for 24 hours. Chest pain, shortness of breath, fever, chills, nausea, vomiting, diarrhea, change in mentation, falling, weakness, bleeding. Severe pain or pain not relieved by medications. Or, any other signs or symptoms that you may have questions about.       DISPOSITION:    Home With:   OT  PT  HH  RN       SNF/Inpatient Rehab/LTAC    Independent/assisted living   x Home Hospice    Other:     CDMP Checked:   Yes x     PROBLEM LIST Updated:  Yes x       Signed:   Kirsten Tiwari MD  11/25/2020  10:28 PM

## 2020-11-25 NOTE — PROGRESS NOTES
[address] [facility] Adult  Hospitalist Group                                                                                          Hospitalist Progress Note  Jonathon Bowie MD  Answering service: [phone] from in house phone        Date of Service:  2020  NAME:  Meenu Dumont  :  1940  MRN:  [id number]      Admission Summary: This is an 57-year-old  female with past medical history significant for Alzheimer's dementia, polymyalgia rheumatica, history of stroke 3 years ago, hypertension, sinus bradycardia, and type 2 diabetes mellitus, who is brought to [facility] from [facility] after the patient experienced vomiting and increasing confusion. The patient with Alzheimer's dementia and poor historian. Most of the information is obtained from her daughter at the bedside. She stated that she has nausea and threw up multiple times since this morning associated and holding her hand on her abdomen. She has also multiple loose stool bowel movement. Her daughter reported that the patient has been slow for the last few days. On baseline, the patient engaged in some conversation and ambulates with a walker. No hematemesis, hematochezia, fever, chills, sweating, cough, contact with COVID-19, denied left-sided chest pain, palpitation or breathing difficulties.        Interval history / Subjective:     Patient with dementia, poor historian, she said she has abdominal pain no left side chest pain or shortness of breath,    Family decided Home with hospice care     Assessment & Plan:     E Coli UTI POA  -received IV ceftriaxone and IVF  -leukocytosis worse, afebrile   -urine cx grow E coli  -blood cx no growth    Paroxysmal A Fib with RVR, now NSR  -off cardizem gtt on  at 4 am,   -continue on metoprolol 12.5 mg bid if she can take po  -mg normal  -cardiologist is on board    Acute metabolic encephalopathy with history of dementia. -likely due to UTI  -patient is conscious and alert, answer simple questions and follows simple command  -continue neuro check and supportive care    Colonic obstruction, possibly due to possible neoplasia. -started on mechanical soft , continue normal saline   -Abdomen distended, has liquid bowel movement but no vomiting  -CTA abdomen pelvis persistent colon obstruction likely due to sigmoid neoplasia with  slightly worsened colon distention and development of small ascites. -daughter at bedside and she said she will agree to have her mother if intervention or surgery indicated  -GI and surgical service on board  -Palliative care consulted and family request hospice care    Anemia of chronic disease.  -stable, no evidence of bleeding  -monitor H/H    Hypokalemia  -replace with KCL, lab pending for today    HTN poorly controlled. -BP  Normal, on prn iv hydralazine, monitor BP    T2DM with hyperglycemia. -A1c 7.3  -finger stick glucose 145-200 overnight, 145 this morning  -patient NPO, hold lantus, monitor finger stick glucose on sliding scale    Hx of polymyalgia rheumatica. -stable, continue prednisone     Hx of stroke. -stable, plavix is held for now    Dementia. -stable  -conscious and alert, oriented to self  -continue home aricept q hs    DNR: Poor prognosis, appropriate for hospice care        Code status: DNR  DVT prophylaxis:SCD    Care Plan discussed with: Patient/Family, Nurse and   Anticipated Disposition:  PT, OT, RN  Anticipated Discharge: Greater than 48 hours     Hospital Problems  Date Reviewed: 11/22/2020          Codes Class Noted POA    UTI (urinary tract infection) ICD-10-CM: N39.0  ICD-9-CM: 599.0  11/20/2020 Unknown                 Vital Signs:    Last 24hrs VS reviewed since prior progress note.  Most recent are:  Visit Vitals  /64 (BP 1 Location: Left arm, BP Patient Position: At rest)   Pulse 75   Temp 98.1 °F (36.7 °C)   Resp 20   Wt 86.1 kg (189 lb 14.4 oz)   SpO2 100%   BMI 30.65 kg/m²       No intake or output data in the 24 hours ending 11/25/20 1891     Physical Examination:     I had a face to face encounter with this patient and independently examined them on 11/25/2020 as outlined below:          Constitutional:  No acute distress, cooperative, pleasant    ENT:  Oral mucosa moist, oropharynx benign. Resp:  CTA bilaterally. No wheezing/rhonchi/rales. No accessory muscle use   CV:  Regular rhythm, normal rate, no murmurs, gallops, rubs    GI:  Abdomen distended, tender. normoactive bowel sounds, no hepatosplenomegaly     Musculoskeletal:  No edema     Neurologic:  Conscious and alert, oriented to self, follows simple command, , CN II-XII reviewed     Skin:  Good turgor, no rashes or ulcers       Data Review:    Review and/or order of clinical lab test  Review and/or order of tests in the radiology section of CPT  Review and/or order of tests in the medicine section of CPT      Labs:     Recent Labs     11/24/20 0116 11/23/20  0051   WBC 24.4* 15.1*   HGB 9.7* 9.0*   HCT 34.1* 32.3*   * 468*     Recent Labs     11/24/20 0116 11/23/20  0051    142   K 2.9* 3.7   * 113*   CO2 19* 19*   BUN 34* 34*   CREA 1.19* 1.06*   * 192*   CA 8.7 8.4*   MG 2.3 2.1     Recent Labs     11/24/20 0116 11/23/20  0051   ALT 14 14   AP 82 68   TBILI 0.2 0.3   TP 6.5 6.0*   ALB 2.2* 2.3*   GLOB 4.3* 3.7     No results for input(s): INR, PTP, APTT, INREXT, INREXT in the last 72 hours. No results for input(s): FE, TIBC, PSAT, FERR in the last 72 hours. No results found for: FOL, RBCF   No results for input(s): PH, PCO2, PO2 in the last 72 hours. No results for input(s): CPK, CKNDX, TROIQ in the last 72 hours.     No lab exists for component: CPKMB  Lab Results   Component Value Date/Time    Cholesterol, total 181 01/30/2018 03:35 AM    HDL Cholesterol 72 01/30/2018 03:35 AM    LDL, calculated 94.6 01/30/2018 03:35 AM    Triglyceride 72 01/30/2018 03:35 AM    CHOL/HDL Ratio 2.5 01/30/2018 03:35 AM     Lab Results   Component Value Date/Time    Glucose (POC) 194 (H) 11/25/2020 05:25 AM    Glucose (POC) 216 (H) 11/24/2020 11:38 PM    Glucose (POC) 223 (H) 11/24/2020 05:26 PM    Glucose (POC) 267 (H) 11/24/2020 12:02 PM    Glucose (POC) 152 (H) 11/24/2020 05:27 AM     Lab Results   Component Value Date/Time    Color YELLOW/STRAW 11/20/2020 12:16 PM    Appearance CLOUDY (A) 11/20/2020 12:16 PM    Specific gravity 1.025 11/20/2020 12:16 PM    pH (UA) 5.0 11/20/2020 12:16 PM    Protein TRACE (A) 11/20/2020 12:16 PM    Glucose >1,000 (A) 11/20/2020 12:16 PM    Ketone TRACE (A) 11/20/2020 12:16 PM    Bilirubin Negative 11/20/2020 12:16 PM    Urobilinogen 0.2 11/20/2020 12:16 PM    Nitrites Positive (A) 11/20/2020 12:16 PM    Leukocyte Esterase SMALL (A) 11/20/2020 12:16 PM    Epithelial cells FEW 11/20/2020 12:16 PM    Bacteria 3+ (A) 11/20/2020 12:16 PM    WBC  11/20/2020 12:16 PM    RBC 0-5 11/20/2020 12:16 PM         Medications Reviewed:     Current Facility-Administered Medications   Medication Dose Route Frequency    metoprolol tartrate (LOPRESSOR) tablet 12.5 mg  12.5 mg Oral BID    haloperidol lactate (HALDOL) injection 3 mg  3 mg IntraVENous Q4H PRN    morphine injection 2 mg  2 mg IntraVENous Q5MIN PRN    donepeziL (ARICEPT) tablet 10 mg  10 mg Oral QHS    acetaminophen (TYLENOL) tablet 650 mg  650 mg Oral Q6H PRN    calcium-vitamin D (OS-GENOVEVA +D3) 500 mg-200 unit per tablet 1 Tab  1 Tab Oral BID    cyanocobalamin (VITAMIN B12) tablet 500 mcg  500 mcg Oral DAILY    lactobac ac& pc-s.therm-b.anim (TEJ Q/RISAQUAD)  1 Cap Oral DAILY    loratadine (CLARITIN) tablet 10 mg  10 mg Oral DAILY PRN    predniSONE (DELTASONE) tablet 5 mg  5 mg Oral DAILY WITH BREAKFAST    sodium chloride (NS) flush 5-40 mL  5-40 mL IntraVENous PRN    insulin lispro (HUMALOG) injection   SubCUTAneous Q6H    glucose chewable tablet 16 g  4 Tab Oral PRN    glucagon (GLUCAGEN) injection 1 mg  1 mg IntraMUSCular PRN    hydrALAZINE (APRESOLINE) 20 mg/mL injection 10 mg  10 mg IntraVENous Q6H PRN    pantoprazole (PROTONIX) 40 mg in 0.9% sodium chloride 10 mL injection  40 mg IntraVENous Q12H    ondansetron (ZOFRAN) injection 4 mg  4 mg IntraVENous Q6H PRN    QUEtiapine (SEROquel) tablet 25 mg  25 mg Oral QHS PRN    dextrose 10 % infusion 125-250 mL  125-250 mL IntraVENous PRN     ______________________________________________________________________  EXPECTED LENGTH OF STAY: 5d 9h  ACTUAL LENGTH OF STAY:          5                 Siddhartha Hillman MD

## 2020-11-25 NOTE — PROGRESS NOTES
I have reviewed discharge instructions with the patient. The patient verbalized understanding. PIV and telemetry discontinued. Patient discharged to [facility] via [facility] with prescriptions and belongings. Daughter, Beny Ford, notified at [phone] via telephone of patient discharge from [facility].

## 2020-11-25 NOTE — PROGRESS NOTES
Bedside and Verbal shift change report given to [address] [place] [address]  (oncoming nurse) by Zechariah Raphael RN (offgoing nurse). Report included the following information SBAR, Kardex, ED Summary, Procedure Summary, Intake/Output, MAR, Recent Results, Cardiac Rhythm NSR/SB, Quality Measures and Dual Neuro Assessment.
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
## 2020-11-25 NOTE — PROGRESS NOTES
Problem: Falls - Risk of  Goal: *Absence of Falls  Description: Document Sulma Vivas Fall Risk and appropriate interventions in the flowsheet. Outcome: Progressing Towards Goal  Note: Fall Risk Interventions:  Mobility Interventions: Bed/chair exit alarm, Communicate number of staff needed for ambulation/transfer, OT consult for ADLs, Patient to call before getting OOB, PT Consult for mobility concerns, PT Consult for assist device competence, Strengthening exercises (ROM-active/passive)    Mentation Interventions: Adequate sleep, hydration, pain control, Bed/chair exit alarm, Door open when patient unattended, Evaluate medications/consider consulting pharmacy, Increase mobility, More frequent rounding, Toileting rounds, Update white board, Reorient patient, Room close to nurse's station, Familiar objects from home    Medication Interventions: Bed/chair exit alarm, Evaluate medications/consider consulting pharmacy, Patient to call before getting OOB, Teach patient to arise slowly    Elimination Interventions: Bed/chair exit alarm, Call light in reach, Patient to call for help with toileting needs, Toileting schedule/hourly rounds, Toilet paper/wipes in reach, Stay With Me (per policy)              Problem: Patient Education: Go to Patient Education Activity  Goal: Patient/Family Education  Outcome: Progressing Towards Goal     Problem: Diabetes Self-Management  Goal: *Disease process and treatment process  Description: Define diabetes and identify own type of diabetes; list 3 options for treating diabetes. Outcome: Progressing Towards Goal  Goal: *Incorporating nutritional management into lifestyle  Description: Describe effect of type, amount and timing of food on blood glucose; list 3 methods for planning meals. Outcome: Progressing Towards Goal  Goal: *Incorporating physical activity into lifestyle  Description: State effect of exercise on blood glucose levels.   Outcome: Progressing Towards Goal  Goal: *Developing strategies to promote health/change behavior  Description: Define the ABC's of diabetes; identify appropriate screenings, schedule and personal plan for screenings. Outcome: Progressing Towards Goal  Goal: *Using medications safely  Description: State effect of diabetes medications on diabetes; name diabetes medication taking, action and side effects. Outcome: Progressing Towards Goal  Goal: *Monitoring blood glucose, interpreting and using results  Description: Identify recommended blood glucose targets  and personal targets. Outcome: Progressing Towards Goal  Goal: *Prevention, detection, treatment of acute complications  Description: List symptoms of hyper- and hypoglycemia; describe how to treat low blood sugar and actions for lowering  high blood glucose level. Outcome: Progressing Towards Goal  Goal: *Prevention, detection and treatment of chronic complications  Description: Define the natural course of diabetes and describe the relationship of blood glucose levels to long term complications of diabetes. Outcome: Progressing Towards Goal  Goal: *Developing strategies to address psychosocial issues  Description: Describe feelings about living with diabetes; identify support needed and support network  Outcome: Progressing Towards Goal  Goal: *Insulin pump training  Outcome: Progressing Towards Goal  Goal: *Sick day guidelines  Outcome: Progressing Towards Goal     Problem: Patient Education: Go to Patient Education Activity  Goal: Patient/Family Education  Outcome: Progressing Towards Goal     Problem: Pressure Injury - Risk of  Goal: *Prevention of pressure injury  Description: Document Elieser Scale and appropriate interventions in the flowsheet.   Outcome: Progressing Towards Goal  Note: Pressure Injury Interventions:  Sensory Interventions: Assess changes in LOC, Avoid rigorous massage over bony prominences, Chair cushion, Check visual cues for pain, Discuss PT/OT consult with provider, Float heels, Keep linens dry and wrinkle-free, Maintain/enhance activity level, Minimize linen layers, Monitor skin under medical devices, Pressure redistribution bed/mattress (bed type), Turn and reposition approx. every two hours (pillows and wedges if needed)    Moisture Interventions: Absorbent underpads, Apply protective barrier, creams and emollients, Limit adult briefs, Maintain skin hydration (lotion/cream), Minimize layers, Moisture barrier, Check for incontinence Q2 hours and as needed    Activity Interventions: Increase time out of bed, Pressure redistribution bed/mattress(bed type), PT/OT evaluation    Mobility Interventions: HOB 30 degrees or less, Pressure redistribution bed/mattress (bed type), PT/OT evaluation, Turn and reposition approx.  every two hours(pillow and wedges)    Nutrition Interventions: Document food/fluid/supplement intake, Offer support with meals,snacks and hydration    Friction and Shear Interventions: Apply protective barrier, creams and emollients, Foam dressings/transparent film/skin sealants, HOB 30 degrees or less, Lift sheet, Lift team/patient mobility team, Minimize layers                Problem: Patient Education: Go to Patient Education Activity  Goal: Patient/Family Education  Outcome: Progressing Towards Goal     Problem: Urinary Tract Infection - Adult  Goal: *Absence of infection signs and symptoms  Outcome: Progressing Towards Goal     Problem: Patient Education: Go to Patient Education Activity  Goal: Patient/Family Education  Outcome: Progressing Towards Goal     Problem: Arrhythmia Pathway (Adult)  Goal: *Absence of arrhythmia  Outcome: Progressing Towards Goal     Problem: Patient Education: Go to Patient Education Activity  Goal: Patient/Family Education  Outcome: Progressing Towards Goal     Problem: Pain  Goal: *Control of acute pain  Outcome: Progressing Towards Goal     Problem: Pressure Injury - Risk of  Goal: *Prevention of pressure injury  Outcome: Progressing Towards Goal     Problem: Discharge Planning  Goal: *Participates in discharge planning  Outcome: Progressing Towards Goal     Problem: Patient Education: Go to Patient Education Activity  Goal: Patient/Family Education  Outcome: Progressing Towards Goal

## 2022-03-19 PROBLEM — E86.0 DEHYDRATION: Status: ACTIVE | Noted: 2018-01-29

## 2022-03-19 PROBLEM — G81.94 LEFT HEMIPARESIS (HCC): Status: ACTIVE | Noted: 2018-01-30

## 2022-03-19 PROBLEM — I47.21 TORSADES DE POINTES (HCC): Status: ACTIVE | Noted: 2018-08-02

## 2022-03-19 PROBLEM — D72.829 LEUKOCYTOSIS: Status: ACTIVE | Noted: 2018-01-29

## 2022-03-19 PROBLEM — I63.9 ACUTE ISCHEMIC STROKE (HCC): Status: ACTIVE | Noted: 2018-01-30

## 2022-03-20 PROBLEM — N39.0 UTI (URINARY TRACT INFECTION): Status: ACTIVE | Noted: 2020-11-20

## 2023-05-11 RX ORDER — CLOPIDOGREL BISULFATE 75 MG/1
75 TABLET ORAL DAILY
COMMUNITY

## 2023-05-11 RX ORDER — NYSTATIN 100000 U/G
CREAM TOPICAL 2 TIMES DAILY
COMMUNITY

## 2023-05-11 RX ORDER — LOPERAMIDE HYDROCHLORIDE 2 MG/1
TABLET ORAL 4 TIMES DAILY PRN
COMMUNITY

## 2023-05-11 RX ORDER — DONEPEZIL HYDROCHLORIDE 10 MG/1
10 TABLET, FILM COATED ORAL NIGHTLY
COMMUNITY

## 2023-05-11 RX ORDER — LORATADINE 10 MG/1
TABLET ORAL DAILY PRN
COMMUNITY
Start: 2017-10-02

## 2023-05-11 RX ORDER — ACETAMINOPHEN 500 MG
TABLET ORAL EVERY 6 HOURS PRN
COMMUNITY
Start: 2017-02-10

## 2023-05-11 RX ORDER — PREDNISONE 5 MG/1
5 TABLET ORAL DAILY
COMMUNITY

## 2023-05-11 RX ORDER — ROSUVASTATIN CALCIUM 10 MG/1
10 TABLET, COATED ORAL NIGHTLY
COMMUNITY

## 2023-05-11 RX ORDER — SENNA PLUS 8.6 MG/1
1 TABLET ORAL 2 TIMES DAILY
COMMUNITY

## 2023-05-11 RX ORDER — DEXTROMETHORPHAN POLISTIREX 30 MG/5ML
SUSPENSION ORAL 2 TIMES DAILY PRN
COMMUNITY
Start: 2017-10-02

## 2023-05-11 RX ORDER — MINERAL OIL 100 G/100G
OIL RECTAL PRN
COMMUNITY

## 2023-05-11 RX ORDER — CALCIUM CARBONATE/VITAMIN D3 600 MG-10
1 TABLET ORAL 2 TIMES DAILY
COMMUNITY

## 2023-05-11 RX ORDER — BENZONATATE 100 MG/1
CAPSULE ORAL 3 TIMES DAILY PRN
COMMUNITY

## 2023-05-11 RX ORDER — QUETIAPINE FUMARATE 25 MG/1
TABLET, FILM COATED ORAL
COMMUNITY
Start: 2020-11-25